# Patient Record
Sex: MALE | Race: ASIAN | NOT HISPANIC OR LATINO | Employment: FULL TIME | URBAN - METROPOLITAN AREA
[De-identification: names, ages, dates, MRNs, and addresses within clinical notes are randomized per-mention and may not be internally consistent; named-entity substitution may affect disease eponyms.]

---

## 2017-10-16 ENCOUNTER — AMBULATORY - RIVER FALLS (OUTPATIENT)
Dept: FAMILY MEDICINE | Facility: CLINIC | Age: 62
End: 2017-10-16

## 2017-10-17 LAB
CHOLEST SERPL-MCNC: 143 MG/DL
CHOLEST/HDLC SERPL: 3 {RATIO}
CREAT SERPL-MCNC: 0.53 MG/DL (ref 0.7–1.25)
GLUCOSE BLD-MCNC: 158 MG/DL (ref 65–99)
HBA1C MFR BLD: 7.4 %
HDLC SERPL-MCNC: 47 MG/DL
LDLC SERPL CALC-MCNC: 74 MG/DL
NONHDLC SERPL-MCNC: 96 MG/DL
PSA SERPL-MCNC: 0.9 NG/ML
TRIGL SERPL-MCNC: 135 MG/DL

## 2017-10-26 ENCOUNTER — OFFICE VISIT - RIVER FALLS (OUTPATIENT)
Dept: FAMILY MEDICINE | Facility: CLINIC | Age: 62
End: 2017-10-26

## 2017-10-26 ASSESSMENT — MIFFLIN-ST. JEOR: SCORE: 1567.93

## 2018-11-03 ENCOUNTER — OFFICE VISIT - RIVER FALLS (OUTPATIENT)
Dept: FAMILY MEDICINE | Facility: CLINIC | Age: 63
End: 2018-11-03

## 2018-11-04 LAB
CHOLEST SERPL-MCNC: 149 MG/DL
CHOLEST/HDLC SERPL: 2.6 {RATIO}
CREAT SERPL-MCNC: 0.58 MG/DL (ref 0.7–1.25)
GLUCOSE BLD-MCNC: 155 MG/DL (ref 65–99)
HBA1C MFR BLD: 8.1 %
HDLC SERPL-MCNC: 57 MG/DL
LDLC SERPL CALC-MCNC: 72 MG/DL
NONHDLC SERPL-MCNC: 92 MG/DL
PSA SERPL-MCNC: 0.8 NG/ML
TRIGL SERPL-MCNC: 123 MG/DL

## 2018-11-09 ENCOUNTER — OFFICE VISIT - RIVER FALLS (OUTPATIENT)
Dept: FAMILY MEDICINE | Facility: CLINIC | Age: 63
End: 2018-11-09

## 2018-11-09 ASSESSMENT — MIFFLIN-ST. JEOR: SCORE: 1554.32

## 2019-09-16 ENCOUNTER — AMBULATORY - RIVER FALLS (OUTPATIENT)
Dept: FAMILY MEDICINE | Facility: CLINIC | Age: 64
End: 2019-09-16

## 2019-11-09 ENCOUNTER — AMBULATORY - RIVER FALLS (OUTPATIENT)
Dept: FAMILY MEDICINE | Facility: CLINIC | Age: 64
End: 2019-11-09

## 2019-11-10 LAB
BUN SERPL-MCNC: 19 MG/DL (ref 7–25)
BUN/CREAT RATIO - HISTORICAL: 29 (ref 6–22)
CALCIUM SERPL-MCNC: 8.9 MG/DL (ref 8.6–10.3)
CHLORIDE BLD-SCNC: 103 MMOL/L (ref 98–110)
CHOLEST SERPL-MCNC: 154 MG/DL
CHOLEST/HDLC SERPL: 3 {RATIO}
CO2 SERPL-SCNC: 26 MMOL/L (ref 20–32)
CREAT SERPL-MCNC: 0.65 MG/DL (ref 0.7–1.25)
CREAT UR-MCNC: 116 MG/DL (ref 20–320)
EGFRCR SERPLBLD CKD-EPI 2021: 104 ML/MIN/1.73M2
GLUCOSE BLD-MCNC: 141 MG/DL (ref 65–99)
HBA1C MFR BLD: 7.8 %
HDLC SERPL-MCNC: 52 MG/DL
LDLC SERPL CALC-MCNC: 79 MG/DL
MICROALBUMIN UR-MCNC: 0.6 MG/DL
MICROALBUMIN/CREAT UR: 5 MG/G{CREAT}
NONHDLC SERPL-MCNC: 102 MG/DL
POTASSIUM BLD-SCNC: 4 MMOL/L (ref 3.5–5.3)
PSA SERPL-MCNC: 1 NG/ML
SODIUM SERPL-SCNC: 137 MMOL/L (ref 135–146)
TRIGL SERPL-MCNC: 125 MG/DL

## 2019-11-11 ENCOUNTER — COMMUNICATION - RIVER FALLS (OUTPATIENT)
Dept: FAMILY MEDICINE | Facility: CLINIC | Age: 64
End: 2019-11-11

## 2019-11-15 ENCOUNTER — OFFICE VISIT - RIVER FALLS (OUTPATIENT)
Dept: FAMILY MEDICINE | Facility: CLINIC | Age: 64
End: 2019-11-15

## 2019-11-15 ASSESSMENT — MIFFLIN-ST. JEOR: SCORE: 1558.86

## 2020-09-25 ENCOUNTER — AMBULATORY - RIVER FALLS (OUTPATIENT)
Dept: FAMILY MEDICINE | Facility: CLINIC | Age: 65
End: 2020-09-25

## 2020-12-15 ENCOUNTER — AMBULATORY - RIVER FALLS (OUTPATIENT)
Dept: FAMILY MEDICINE | Facility: CLINIC | Age: 65
End: 2020-12-15

## 2020-12-16 LAB
BUN SERPL-MCNC: 17 MG/DL (ref 7–25)
BUN/CREAT RATIO - HISTORICAL: ABNORMAL (ref 6–22)
CALCIUM SERPL-MCNC: 9 MG/DL (ref 8.6–10.3)
CHLORIDE BLD-SCNC: 101 MMOL/L (ref 98–110)
CHOLEST SERPL-MCNC: 147 MG/DL
CHOLEST/HDLC SERPL: 3.1 {RATIO}
CO2 SERPL-SCNC: 26 MMOL/L (ref 20–32)
CREAT SERPL-MCNC: 0.71 MG/DL (ref 0.7–1.25)
CREAT UR-MCNC: 99 MG/DL (ref 20–320)
EGFRCR SERPLBLD CKD-EPI 2021: 100 ML/MIN/1.73M2
GLUCOSE BLD-MCNC: 136 MG/DL (ref 65–99)
HBA1C MFR BLD: 7.5 %
HDLC SERPL-MCNC: 48 MG/DL
LDLC SERPL CALC-MCNC: 76 MG/DL
MICROALBUMIN UR-MCNC: 0.3 MG/DL
MICROALBUMIN/CREAT UR: 3 MG/G{CREAT}
NONHDLC SERPL-MCNC: 99 MG/DL
POTASSIUM BLD-SCNC: 4 MMOL/L (ref 3.5–5.3)
PSA SERPL-MCNC: 0.9 NG/ML
SODIUM SERPL-SCNC: 136 MMOL/L (ref 135–146)
TRIGL SERPL-MCNC: 148 MG/DL

## 2020-12-17 ENCOUNTER — COMMUNICATION - RIVER FALLS (OUTPATIENT)
Dept: FAMILY MEDICINE | Facility: CLINIC | Age: 65
End: 2020-12-17

## 2020-12-28 ENCOUNTER — OFFICE VISIT - RIVER FALLS (OUTPATIENT)
Dept: FAMILY MEDICINE | Facility: CLINIC | Age: 65
End: 2020-12-28

## 2020-12-28 ASSESSMENT — MIFFLIN-ST. JEOR: SCORE: 1595.15

## 2021-06-23 ENCOUNTER — AMBULATORY - RIVER FALLS (OUTPATIENT)
Dept: FAMILY MEDICINE | Facility: CLINIC | Age: 66
End: 2021-06-23

## 2021-06-24 ENCOUNTER — COMMUNICATION - RIVER FALLS (OUTPATIENT)
Dept: FAMILY MEDICINE | Facility: CLINIC | Age: 66
End: 2021-06-24

## 2021-06-24 LAB
CHOLEST SERPL-MCNC: 116 MG/DL
CHOLEST/HDLC SERPL: 2.7 {RATIO}
HBA1C MFR BLD: 8.3 %
HDLC SERPL-MCNC: 43 MG/DL
LDLC SERPL CALC-MCNC: 47 MG/DL
NONHDLC SERPL-MCNC: 73 MG/DL
TRIGL SERPL-MCNC: 181 MG/DL

## 2021-10-08 ENCOUNTER — AMBULATORY - RIVER FALLS (OUTPATIENT)
Dept: FAMILY MEDICINE | Facility: CLINIC | Age: 66
End: 2021-10-08

## 2021-12-29 ENCOUNTER — AMBULATORY - RIVER FALLS (OUTPATIENT)
Dept: FAMILY MEDICINE | Facility: CLINIC | Age: 66
End: 2021-12-29

## 2021-12-30 ENCOUNTER — COMMUNICATION - RIVER FALLS (OUTPATIENT)
Dept: FAMILY MEDICINE | Facility: CLINIC | Age: 66
End: 2021-12-30

## 2021-12-30 LAB
BUN SERPL-MCNC: 20 MG/DL (ref 7–25)
BUN/CREAT RATIO - HISTORICAL: ABNORMAL (ref 6–22)
CALCIUM SERPL-MCNC: 9.3 MG/DL (ref 8.6–10.3)
CHLORIDE BLD-SCNC: 102 MMOL/L (ref 98–110)
CO2 SERPL-SCNC: 26 MMOL/L (ref 20–32)
CREAT SERPL-MCNC: 0.71 MG/DL (ref 0.7–1.25)
CREAT UR-MCNC: 106 MG/DL (ref 20–320)
EGFRCR SERPLBLD CKD-EPI 2021: 99 ML/MIN/1.73M2
GLUCOSE BLD-MCNC: 129 MG/DL (ref 65–99)
HBA1C MFR BLD: 7.7 %
MICROALBUMIN UR-MCNC: 0.4 MG/DL
MICROALBUMIN/CREAT UR: 4 MG/G{CREAT}
POTASSIUM BLD-SCNC: 4.1 MMOL/L (ref 3.5–5.3)
PSA SERPL-MCNC: 1.16 NG/ML
SODIUM SERPL-SCNC: 138 MMOL/L (ref 135–146)

## 2022-02-12 VITALS
HEIGHT: 68 IN | WEIGHT: 178 LBS | BODY MASS INDEX: 26.98 KG/M2 | SYSTOLIC BLOOD PRESSURE: 125 MMHG | DIASTOLIC BLOOD PRESSURE: 78 MMHG | HEART RATE: 84 BPM

## 2022-02-12 VITALS
DIASTOLIC BLOOD PRESSURE: 85 MMHG | BODY MASS INDEX: 27.89 KG/M2 | HEIGHT: 68 IN | DIASTOLIC BLOOD PRESSURE: 80 MMHG | SYSTOLIC BLOOD PRESSURE: 142 MMHG | HEART RATE: 72 BPM | WEIGHT: 184 LBS | HEART RATE: 76 BPM | TEMPERATURE: 97.6 F | SYSTOLIC BLOOD PRESSURE: 142 MMHG

## 2022-02-12 VITALS
DIASTOLIC BLOOD PRESSURE: 84 MMHG | SYSTOLIC BLOOD PRESSURE: 132 MMHG | HEIGHT: 68 IN | BODY MASS INDEX: 26.52 KG/M2 | WEIGHT: 175 LBS | HEART RATE: 92 BPM

## 2022-02-12 VITALS
DIASTOLIC BLOOD PRESSURE: 68 MMHG | WEIGHT: 176 LBS | HEART RATE: 96 BPM | HEIGHT: 68 IN | TEMPERATURE: 97.8 F | SYSTOLIC BLOOD PRESSURE: 136 MMHG | BODY MASS INDEX: 26.67 KG/M2

## 2022-02-12 VITALS
HEIGHT: 68 IN | HEART RATE: 87 BPM | BODY MASS INDEX: 28.18 KG/M2 | SYSTOLIC BLOOD PRESSURE: 120 MMHG | DIASTOLIC BLOOD PRESSURE: 78 MMHG

## 2022-02-15 NOTE — LETTER
(Inserted Image. Unable to display)         December 19, 2019        YOANTHAN KENT  [address], [place] [id number]        Dear YONATHAN,    Thank you for selecting [facility] for your healthcare needs.    Our records indicate you are due for the following services:     Clinical Support Staff (CSS)-Only Blood Pressure Check ~ Please stop in anytime to have your blood pressure rechecked. This is a free service and no appointment necessary.     So we can best determine if your medications are effective in lowering your blood pressure, please make sure your blood pressure medicine has been in your system for at least 1-2 hours prior to coming in.  We encourage you to avoid caffeine or other stimulants prior to having your blood pressure checked and come at a time when you are not feeling rushed.     If you check your blood pressure at home, please bring in your blood pressure monitor and home blood pressure readings.  We will check your machine for accuracy and also share your home readings with your Healthcare Provider.     To schedule an appointment or if you have further questions, please contact your primary clinic:   [facility]       [phone]   [facility]       [phone]              [facility]     [phone]      Powered by [employer] and [employer]    Sincerely,    Aiden Drummond MD, FACP

## 2022-02-15 NOTE — NURSING NOTE
Comprehensive Intake Entered On:  11/15/2019 9:45 AM CST    Performed On:  11/15/2019 9:40 AM CST by Erika Jung               Summary   Chief Complaint :   Patient is here today for annual px   Weight Measured :   176.0 lb(Converted to: 176 lb 0 oz, 79.83 kg)    Height Measured :   67.75 in(Converted to: 5 ft 8 in, 172.08 cm)    Body Mass Index :   26.96 kg/m2 (HI)    Body Surface Area :   1.95 m2   Systolic Blood Pressure :   138 mmHg (HI)    Diastolic Blood Pressure :   72 mmHg   Mean Arterial Pressure :   94 mmHg   Peripheral Pulse Rate :   96 bpm   BP Site :   Right arm   BP Method :   Manual   HR Method :   Electronic   Temperature Tympanic :   97.8 DegF(Converted to: 36.6 DegC)  (LOW)    Erika Jung - 11/15/2019 9:40 AM CST   Health Status   Allergies Verified? :   Yes   Medication History Verified? :   Yes   Medical History Verified? :   Yes   Pre-Visit Planning Status :   Completed   Tobacco Use? :   Never smoker   Erika Jung - 11/15/2019 9:40 AM CST   Consents   Consent for Immunization Exchange :   Consent Granted   Consent for Immunizations to Providers :   Consent Granted   Erika Jung - 11/15/2019 9:40 AM CST   Meds / Allergies   (As Of: 11/15/2019 9:45:23 AM CST)   Allergies (Active)   No Known Medication Allergies  Estimated Onset Date:   Unspecified ; Created By:   Brisa Schreiber MA; Reaction Status:   Active ; Category:   Drug ; Substance:   No Known Medication Allergies ; Type:   Allergy ; Updated By:   Brisa Schreiber MA; Reviewed Date:   11/15/2019 9:42 AM CST        Medication List   (As Of: 11/15/2019 9:45:23 AM CST)   Prescription/Discharge Order    dapagliflozin  :   dapagliflozin ; Status:   Prescribed ; Ordered As Mnemonic:   Farxiga 5 mg oral tablet ; Simple Display Line:   5 mg, 1 tab(s), Oral, daily, for 90 day(s), 90 tab(s), 3 Refill(s) ; Ordering Provider:   Aiden Drummond MD; Catalog Code:   dapagliflozin ; Order Dt/Tm:   11/9/2018 12:32:24 PM CST           metFORMIN  :   metFORMIN ; Status:   Prescribed ; Ordered As Mnemonic:   metFORMIN 500 mg oral tablet, extended release ; Simple Display Line:   1,000 mg, 2 tab(s), po, daily, for 90 day(s), 180 tab(s), 3 Refill(s) ; Ordering Provider:   Aiden Drummond MD; Catalog Code:   metFORMIN ; Order Dt/Tm:   11/9/2018 12:32:22 PM CST          hydrochlorothiazide-lisinopril  :   hydrochlorothiazide-lisinopril ; Status:   Prescribed ; Ordered As Mnemonic:   hydrochlorothiazide-lisinopril 12.5 mg-20 mg oral tablet ; Simple Display Line:   1 tab(s), PO, Daily, 90 tab(s), 3 Refill(s) ; Ordering Provider:   Aiden Drummond MD; Catalog Code:   hydrochlorothiazide-lisinopril ; Order Dt/Tm:   11/9/2018 12:32:20 PM CST          Miscellaneous Rx Supply  :   Miscellaneous Rx Supply ; Status:   Prescribed ; Ordered As Mnemonic:   Misc Prescription Supply ; Simple Display Line:   See Instructions, test daily, 100 EA, 3 Refill(s) ; Ordering Provider:   Aiden Drummond MD; Catalog Code:   Miscellaneous Rx Supply ; Order Dt/Tm:   11/9/2018 12:32:19 PM CST          pravastatin  :   pravastatin ; Status:   Prescribed ; Ordered As Mnemonic:   Pravachol 20 mg oral tablet ; Simple Display Line:   20 mg, 1 tab(s), PO, Daily, 90 tab(s), 3 Refill(s) ; Ordering Provider:   Aiden Drummond MD; Catalog Code:   pravastatin ; Order Dt/Tm:   11/9/2018 12:32:16 PM CST

## 2022-02-15 NOTE — NURSING NOTE
Depression Screening Entered On:  11/15/2019 10:07 AM CST    Performed On:  11/15/2019 10:06 AM CST by Erika Jung               Depression Screening   Little Interest - Pleasure in Activities :   Not at all   Feeling Down, Depressed, Hopeless :   Not at all   Initial Depression Screen Score :   0    Trouble Falling or Staying Asleep :   Not at all   Feeling Tired or Little Energy :   Not at all   Poor Appetite or Overeating :   Not at all   Feeling Bad About Yourself :   Not at all   Trouble Concentrating :   Not at all   Moving or Speaking Slowly :   Not at all   Thoughts Better Off Dead or Hurting Self :   Not at all   Detailed Depression Screen Score :   0    Total Depression Screen Score :   0    BEENA Difficulty with Work, Home, Others :   Not difficult at all   Depression Screen Interpretation :   Negative   Erika Jung - 11/15/2019 10:06 AM CST

## 2022-02-15 NOTE — LETTER
(Inserted Image. Unable to display)   November 11, 2019      YONATHAN KENT      [address], [place] [id number]        Dear YONATHAN,    Thank you for selecting [facility] ([facility] for your healthcare needs.  Below you will find the results of your recent tests done at our clinic.     Diabetes not well controlled. Please schedule an appointment.      Result Name Current Result Previous Result Reference Range   Sodium Level (mmol/L)  137 11/9/2019  137 11/3/2018 135 - 146   Potassium Level (mmol/L)  4.0 11/9/2019  4.2 11/3/2018 3.5 - 5.3   Chloride Level (mmol/L)  103 11/9/2019  101 11/3/2018 98 - 110   CO2 Level (mmol/L)  26 11/9/2019  27 11/3/2018 20 - 32   Glucose Level (mg/dL) ((H)) 141 11/9/2019 ((H)) 155 11/3/2018 65 - 99   BUN (mg/dL)  19 11/9/2019  16 11/3/2018 7 - 25   Creatinine Level (mg/dL) ((L)) 0.65 11/9/2019 ((L)) 0.58 11/3/2018 0.70 - 1.25   eGFR (mL/min/1.73m2)  104 11/9/2019  110 11/3/2018 > OR = 60 -    Calcium Level (mg/dL)  8.9 11/9/2019  9.0 11/3/2018 8.6 - 10.3   Hgb A1c ((H)) 7.8 11/9/2019 ((H)) 8.1 11/3/2018  - <5.7   Cholesterol (mg/dL)  154 11/9/2019  149 11/3/2018  - <200   Non-HDL Cholesterol  102 11/9/2019  92 11/3/2018  - <130   HDL (mg/dL)  52 11/9/2019  57 11/3/2018 >40 -    Cholesterol/HDL Ratio  3.0 11/9/2019  2.6 11/3/2018  - <5.0   LDL  79 11/9/2019  72 11/3/2018    Triglyceride (mg/dL)  125 11/9/2019  123 11/3/2018  - <150   PSA (ng/mL)  1.0 11/9/2019  0.8 11/3/2018  - < OR = 4.0   Ur Microalbumin/Creatinine Ratio  5 11/9/2019  8 11/3/2018  - <30       Please contact me or my assistant at [phone] if you have any questions.     Sincerely,        Aiden Drummond MD

## 2022-02-15 NOTE — TELEPHONE ENCOUNTER
Entered by Eliana Cheng CMA on November 19, 2019 2:51:46 PM CST  ---------------------  From: Eliana Cheng CMA   To: [facility]    Sent: 11/19/2019 2:51:46 PM CST  Subject: Medication Management     ** Submitted: **  Order:dapagliflozin (Farxiga 10 mg oral tablet)  1 tab(s)  Oral  daily  Qty:  90 tab(s)        Refills:  0          Substitutions Allowed     Route To Pharmacy - [facility]    Signed by Eliana Cheng CMA  11/19/2019 2:51:00 PM    ** Submitted: **  Complete:dapagliflozin (dapagliflozin 10 mg oral tablet)   Signed by Eliana Cheng CMA  11/19/2019 2:51:00 PM    ** Not Approved:  **  dapagliflozin (FARXIGA 10 MG TABLET)  TAKE 1 TABLET BY MOUTH DAILY  Qty:  30 tab(s)        Days Supply:  30        Refills:  0          Substitutions Allowed     Route To Pharmacy - [facility]   Note from Pharmacy:  PLEASE SEND 90 DAY SUPPLY FOR MAIL ORDER THANKS!  Signed by Eliana Cheng CMA            ------------------------------------------  From: [facility]  To: Aiden Drummond MD  Sent: November 18, 2019 3:01:01 PM KIARRA  Subject: Medication Management  Due: November 19, 2019 3:01:01 PM CST    ** On Hold Pending Signature **  Drug: dapagliflozin (Farxiga 10 mg oral tablet)  1 tab(s) Oral daily  Quantity: 30 tab(s)  Days Supply: 30  Refills: 0  Substitutions Allowed  Notes from Pharmacy: PLEASE SEND 90 DAY SUPPLY FOR MAIL ORDER THANKS!    Dispensed Drug: dapagliflozin (Farxiga 10 mg oral tablet)  TAKE 1 TABLET BY MOUTH DAILY  Quantity: 30 tab(s)  Days Supply: 30  Refills: 0  Substitutions Allowed  Notes from Pharmacy: PLEASE SEND 90 DAY SUPPLY FOR MAIL ORDER THANKS!  ------------------------------------------Med Refill      Date of last office visit and reason:  11/15/19; annual      Date of last Med Check / Px:   11/15/19  Date of last labs pertaining to med:  11/9/19    RTC order in chart:  yes; due in Feb for repeat labs    For Protocol refill, has patient been contacted:  n/a

## 2022-02-15 NOTE — LETTER
(Inserted Image. Unable to display)         March 29, 2021      YONATHAN KENT  [address] [place] [id number]          Dear YONATHAN,      Thank you for selecting [facility] for your healthcare needs.    Our records indicate you are due for the following services:     Fasting Lab Tests ~ Please do not eat or drink anything 10 hours prior to your scheduled appointment time.  (Water and any medications that you may need are allowed unless directed otherwise.)    If you had your labs done at another facility or with Direct Access Lab Testing at [facility], please bring in a copy of the results to your next visit, mail a copy, or drop off a copy of your results to your Healthcare Provider.    (FYI   Regarding office visits: In some instances, a video visit or telephone visit may be offered as an option.)      To schedule an appointment or if you have further questions, please contact your clinic at [phone].      Powered by [employer] and [employer]    Sincerely,    Aiden Drummond MD, FACP

## 2022-02-15 NOTE — NURSING NOTE
CAGE Assessment Entered On:  11/18/2019 1:09 PM CST    Performed On:  11/15/2019 1:09 PM CST by Ella April               Assessment   Have you ever felt you should cut down on your drinking :   No   Have people annoyed you by criticizing your drinking :   No   Have you ever felt bad or guilty about your drinking :   No   Have you ever taken a drink first thing in the morning to steady your nerves or get rid of a hangover (Eye-opener) :   No   CAGE Score :   0    Ella , April - 11/18/2019 1:09 PM CST

## 2022-02-15 NOTE — NURSING NOTE
Comprehensive Intake Entered On:  12/28/2020 3:09 PM CST    Performed On:  12/28/2020 3:02 PM CST by Anuja Christian LPN               Summary   Chief Complaint :   Annual physical and follow up labs   Weight Measured :   184 lb(Converted to: 184 lb 0 oz, 83.461 kg)    Height Measured :   67.75 in(Converted to: 5 ft 8 in, 172.08 cm)    Body Mass Index :   28.18 kg/m2 (HI)    Body Surface Area :   2 m2   Systolic Blood Pressure :   134 mmHg (HI)    Diastolic Blood Pressure :   84 mmHg (HI)    Mean Arterial Pressure :   101 mmHg   Peripheral Pulse Rate :   76 bpm   BP Site :   Right arm   Pulse Site :   Radial artery   BP Method :   Manual   HR Method :   Manual   Temperature Tympanic :   97.6 DegF(Converted to: 36.4 DegC)  (LOW)    Anuja Christian LPN - 12/28/2020 3:02 PM CST   Health Status   Allergies Verified? :   Yes   Medication History Verified? :   Yes   Pre-Visit Planning Status :   Completed   Tobacco Use? :   Never smoker   Anuja Christian LPN - 12/28/2020 3:02 PM CST   Consents   Consent for Immunization Exchange :   Consent Granted   Consent for Immunizations to Providers :   Consent Granted   Anuja Christian LPN - 12/28/2020 3:02 PM CST   Meds / Allergies   (As Of: 12/28/2020 3:09:59 PM CST)   Allergies (Active)   No Known Medication Allergies  Estimated Onset Date:   Unspecified ; Created By:   Brisa Schreiber MA; Reaction Status:   Active ; Category:   Drug ; Substance:   No Known Medication Allergies ; Type:   Allergy ; Updated By:   Brisa Schreiber MA; Reviewed Date:   11/15/2019 9:42 AM CST        Medication List   (As Of: 12/28/2020 3:09:59 PM CST)   Prescription/Discharge Order    dapagliflozin  :   dapagliflozin ; Status:   Completed ; Ordered As Mnemonic:   Farxiga 10 mg oral tablet ; Simple Display Line:   1 tab(s), Oral, daily, 90 tab(s), 0 Refill(s) ; Ordering Provider:   Aiden Drummond MD; Catalog Code:   dapagliflozin ; Order Dt/Tm:   11/19/2019 2:51:25 PM CST           hydrochlorothiazide-lisinopril  :   hydrochlorothiazide-lisinopril ; Status:   Prescribed ; Ordered As Mnemonic:   hydrochlorothiazide-lisinopril 12.5 mg-20 mg oral tablet ; Simple Display Line:   2 tab(s), PO, Daily, for 90 day(s), 180 tab(s), 3 Refill(s) ; Ordering Provider:   Aiden Drummond MD; Catalog Code:   hydrochlorothiazide-lisinopril ; Order Dt/Tm:   11/15/2019 9:53:17 AM CST          metFORMIN  :   metFORMIN ; Status:   Prescribed ; Ordered As Mnemonic:   metFORMIN 500 mg oral tablet, extended release ; Simple Display Line:   2,000 mg, 4 tab(s), po, daily, for 90 day(s), 360 tab(s), 3 Refill(s) ; Ordering Provider:   Aiden Drummond MD; Catalog Code:   metFORMIN ; Order Dt/Tm:   11/15/2019 9:53:38 AM CST          Miscellaneous Rx Supply  :   Miscellaneous Rx Supply ; Status:   Prescribed ; Ordered As Mnemonic:   Misc Prescription Supply ; Simple Display Line:   See Instructions, test daily, 100 EA, 3 Refill(s) ; Ordering Provider:   Aiden Drummond MD; Catalog Code:   Miscellaneous Rx Supply ; Order Dt/Tm:   11/15/2019 9:54:53 AM CST          pravastatin  :   pravastatin ; Status:   Prescribed ; Ordered As Mnemonic:   Pravachol 40 mg oral tablet ; Simple Display Line:   40 mg, 1 tab(s), Oral, daily, 90 tab(s), 3 Refill(s) ; Ordering Provider:   Aiden Drummond MD; Catalog Code:   pravastatin ; Order Dt/Tm:   11/15/2019 9:55:04 AM CST            ID Risk Screen   Recent Travel History :   No recent travel   Family Member Travel History :   No recent travel   Other Exposure to Infectious Disease :   Unknown   Anuja Christian LPN - 12/28/2020 3:02 PM CST   Social History   Social History   (As Of: 12/28/2020 3:09:59 PM CST)   Alcohol:        Current, Beer (12 oz), 5-6 times per week   (Last Updated: 11/1/2017 10:48:58 AM CDT by Swati Landrum)          Tobacco:        Never (less than 100 in lifetime)   (Last Updated: 12/28/2020 3:02:25 PM CST by Anuja Christian LPN)          Electronic  Cigarette/Vaping:        Electronic Cigarette Use: Never.   (Last Updated: 12/28/2020 3:02:29 PM CST by Anuja Christian LPN)          Substance Abuse:        Never   (Last Updated: 11/14/2018 7:36:44 AM CST by Swati Landrum)          Employment/School:        Employed, Work/School description: College Prof..   (Last Updated: 11/29/2010 2:49:41 PM CST by Peg Bustos)          Home/Environment:        Marital status: .   (Last Updated: 11/14/2018 7:37:18 AM CST by Swati Landrum)          Exercise:        Exercise frequency: 5-6 times/week.  Exercise type: Running.   (Last Updated: 11/14/2018 7:37:04 AM CST by Swtai Landrum)          Sexual:        Identifies as male, Sexual orientation: Straight or heterosexual.   (Last Updated: 11/14/2018 7:37:11 AM CST by Swati Landrum)

## 2022-02-15 NOTE — PROGRESS NOTES
Patient:   YONATHAN KENT            MRN: [id number]            FIN: [id number]               Age:   63 years     Sex:  Male     :  1955   Associated Diagnoses:   Diabetes mellitus; Hearing loss; Hematuria, microscopic; HTN (hypertension); Wellness examination; Pain of left forearm   Author:   Aiden Drummond MD      Visit Information   Visit type:  Annual exam.    Accompanied by:  No one.    Source of history:  Self.    History limitation:  None.       Chief Complaint   2018 11:42 AM CST   physical, left arm pain      History of Present Illness    The patient is here for follow up of his hypertension and diabetes.  His A1c has gone up and his blood pressure is not at goal.  Generally he is feeling well, watching his diet and trying to get some regular exercise.  He had a fall with his left hand outstretched in July while in the garden and complains of pain distal to the left elbow.  No other complaints.          Review of Systems   See [facility]       Health Status   Allergies:    Allergic Reactions (Selected)  No Known Medication Allergies   Medications:  (Selected)   Prescriptions  Prescribed  Farxiga 5 mg oral tablet: = 1 tab(s) ( 5 mg ), Oral, daily, # 90 tab(s), 3 Refill(s), Type: Maintenance, Pharmacy: [facility], 1 tab(s) Oral daily,x90 day(s)  [facility] Prescription Supply: accucheck umu test strips, See Instructions, Instructions: test daily, Supply, # 100 EA, 3 Refill(s), Type: Maintenance, Pharmacy: [facility], resending since accucheck umu test strips was missing from original order you received, test d...  Pravachol 20 mg oral tablet: = 1 tab(s) ( 20 mg ), PO, Daily, # 90 tab(s), 3 Refill(s), Type: Maintenance, Pharmacy: [facility], 1 tab(s) Oral daily  hydrochlorothiazide-lisinopril 12.5 mg-20 mg oral tablet: 1 tab(s), PO, Daily, # 90 tab(s), 3 Refill(s), Type: Maintenance, Pharmacy: [facility], 1 tab(s) Oral daily  metFORMIN 500 mg oral tablet, extended  release: = 2 tab(s) ( 1,000 mg ), po, daily, # 180 tab(s), 3 Refill(s), Type: Maintenance, Pharmacy: [facility], 2 tab(s) Oral daily,x90 day(s),    Medications          *denotes recorded medication          Misc Prescription Supply: See Instructions, test daily, 100 EA, 3 Refill(s).          Farxiga 5 mg oral tablet: 5 mg, 1 tab(s), Oral, daily, for 90 day(s), 90 tab(s), 3 Refill(s).          hydrochlorothiazide-lisinopril 12.5 mg-20 mg oral tablet: 1 tab(s), PO, Daily, 90 tab(s), 3 Refill(s).          metFORMIN 500 mg oral tablet, extended release: 1,000 mg, 2 tab(s), po, daily, for 90 day(s), 180 tab(s), 3 Refill(s).          Pravachol 20 mg oral tablet: 20 mg, 1 tab(s), PO, Daily, 90 tab(s), 3 Refill(s).     Problem list:    All Problems  Diabetes mellitus / SNOMED CT 515873389 / Confirmed  Hearing loss / SNOMED CT 94209900 / Confirmed  Hematuria, microscopic / SNOMED CT 367989270 / Confirmed  HTN (hypertension) / SNOMED CT 2767499471 / Confirmed  Canceled: HTN, goal below 140/90 / SNOMED CT 9024456994      Histories   Past Medical History:    Active  Diabetes mellitus (265076342): Onset on 1/1/2007 at 51 years.  HTN (hypertension) (4212223506): Onset on 1/1/2000 at 44 years.  Hearing loss (82801633)  Hematuria, microscopic (426211124)  Comments:  8/1/2013 CDT 12:07 PM CDT - Didi Fernandez  Microscopic    10/22/2015 CDT 3:21 PM CDT - Aiden Drummond MD  Negative workup   Family History:    CA - Lung cancer  Father: onset at 79 years.  Diabetes mellitus type 2  Father     Procedure history:    Screening for malignant neoplasm of colon (5951517922) on 11/18/2015 at 60 Years.  Comments:  12/21/2015 11:48 AM - Dorschner , Idalmis  Cologuard is negative  Cystoscopy (85172213) on 7/13/2000 at 44 Years.   Social History:        Alcohol Assessment            Current, Beer (12 oz), 5-6 times per week      Employment and Education Assessment            Employed, Work/School description: College Prof..      Exercise  and Physical Activity Assessment            Exercise frequency: Daily.                     Comments:                      10/22/2015 - Hoda LOGAN, Aiden                     Runs 6 miles      Other Assessment            Marital status,         Physical Examination   Vital Signs   11/9/2018 11:53 AM CST Systolic Blood Pressure 132 mmHg  HI    Diastolic Blood Pressure 84 mmHg  HI    Mean Arterial Pressure 100 mmHg    BP Site Right arm    BP Method Manual   11/9/2018 11:42 AM CST Peripheral Pulse Rate 92 bpm    Systolic Blood Pressure 142 mmHg  HI    Diastolic Blood Pressure 80 mmHg    Mean Arterial Pressure 101 mmHg    BP Site Right arm      Measurements from flowsheet : Measurements   11/9/2018 11:42 AM CST Height Measured - Standard 67.75 in    Weight Measured - Standard 175 lb    BSA 1.95 m2    Body Mass Index 26.8 kg/m2  HI      General:  Alert and oriented, No acute distress.    Eye:  Normal conjunctiva.    HENT:  Normocephalic, Normal hearing, Oral mucosa is moist, No pharyngeal erythema, Hearing aide left ear.    Neck:  Supple, Non-tender, No carotid bruit, No lymphadenopathy, No thyromegaly.    Respiratory:  Lungs are clear to auscultation, Respirations are non-labored.    Cardiovascular:  Normal rate, Regular rhythm, No murmur, No gallop, Normal peripheral perfusion, No edema.    Gastrointestinal:  Soft, Non-tender, Non-distended, Normal bowel sounds, No organomegaly.    Genitourinary:       Prostate: Symmetric, Enlarged  3 /4, Not nodular.    Lymphatics:  No lymphadenopathy neck, axilla, groin.    Musculoskeletal:  No deformity, Normal gait.         Upper extremity exam: Elbow ( Left, No deformity, No swelling, No tenderness, Strength  5 /5, Normal range of motion ).    Feet:  Normal by visual exam, Normal pulses, Sensation intact, By monofilament exam, By vibration.    Integumentary:  No pallor.    Neurologic:  No focal deficits.    Cognition and Speech:  Oriented, Speech clear and coherent,  Functional cognition intact.    Psychiatric:  Cooperative, Appropriate mood & affect, Normal judgment, Non-suicidal.       Review / Management   Results review:  Lab results   11/3/2018 8:12 AM CDT Sodium Level 137 mmol/L    Potassium Level 4.2 mmol/L    Chloride Level 101 mmol/L    CO2 Level 27 mmol/L    Glucose Level 155 mg/dL  HI    BUN 16 mg/dL    Creatinine Level 0.58 mg/dL  LOW    BUN/Creat Ratio 28  HI    eGFR 110 mL/min/1.73m2    eGFR African American 128 mL/min/1.73m2    Calcium Level 9.0 mg/dL    Hgb A1c 8.1  HI    Cholesterol 149 mg/dL    Non-HDL Cholesterol 92    HDL 57 mg/dL    Cholesterol/HDL Ratio 2.6    LDL 72    Triglyceride 123 mg/dL    PSA 0.8 ng/mL    U Microalbumin 1.2 mg/dL    Ur Creatinine 153 mg/dL    Ur Microalbumin/Creatinine Ratio 8   .       Impression and Plan   Diagnosis     Diabetes mellitus (XTQ71-JP E11.9).     Hearing loss (ECU49-YD H91.90).     Hematuria, microscopic (QNE13-SL R31.29).     HTN (hypertension) (KNC13-ZV I10).     Wellness examination (SVQ38-IH Z00.00).     Patient Instructions:       Counseled: Diet, Activity, Verbalized understanding.    Summary:  BP and A1c Above goal.    Orders     Orders (Selected)   Outpatient Orders  Ordered  Return to Clinic (Request): RFV: BMP, Hgb A1c, Return in 3 months  Return to Clinic (Request): RFV: Standing Orders  Return to Clinic (Request): RFV: Wellness, Return in 12 months, Instructions: Blood work before visit.  Prescriptions  Prescribed  Farxiga 5 mg oral tablet: = 1 tab(s) ( 5 mg ), Oral, daily, # 90 tab(s), 3 Refill(s), Type: Maintenance, Pharmacy: [facility], 1 tab(s) Oral daily,x90 day(s)  [facility] Prescription Supply: accucheck umu test strips, See Instructions, Instructions: test daily, Supply, # 100 EA, 3 Refill(s), Type: Maintenance, Pharmacy: [facility], resending since accucheck umu test strips was missing from original order you received, test d...  Pravachol 20 mg oral tablet: = 1 tab(s) ( 20 mg ),  PO, Daily, # 90 tab(s), 3 Refill(s), Type: Maintenance, Pharmacy: [facility], 1 tab(s) Oral daily  hydrochlorothiazide-lisinopril 12.5 mg-20 mg oral tablet: 1 tab(s), PO, Daily, # 90 tab(s), 3 Refill(s), Type: Maintenance, Pharmacy: [facility], 1 tab(s) Oral daily  metFORMIN 500 mg oral tablet, extended release: = 2 tab(s) ( 1,000 mg ), po, daily, # 180 tab(s), 3 Refill(s), Type: Maintenance, Pharmacy: [facility], 2 tab(s) Oral daily,x90 day(s)  Discontinued  Januvia 100 mg oral tablet: 1 tab(s) ( 100 mg ), po, daily, # 90 tab(s), 3 Refill(s), Type: Maintenance, Pharmacy: [facility], 1 tab(s) po daily,x90 day(s).     Annual Flu.     Cologuard.     Diagnosis     Pain of left forearm (BVS42-UV M79.632).     Course:  Improving.    Orders     Orders (Selected)   Outpatient Orders  Ordered  XR Forearm Left (Request): Pain of left forearm.

## 2022-02-15 NOTE — NURSING NOTE
CAGE Assessment Entered On:  12/28/2020 3:58 PM CST    Performed On:  12/28/2020 3:57 PM CST by Caroline Tadeo               Assessment   Have you ever felt you should cut down on your drinking :   No   Have people annoyed you by criticizing your drinking :   No   Have you ever felt bad or guilty about your drinking :   No   Have you ever taken a drink first thing in the morning to steady your nerves or get rid of a hangover (Eye-opener) :   No   CAGE Score :   0    Caroline Tadeo - 12/28/2020 3:57 PM CST

## 2022-02-15 NOTE — CARE COORDINATION
Pt appears on [facility] chronic disease panel as out of parameters for A1C.  Pt has chart alert saying he will only come in yearly for physical and labs, nothing more.  Pt was seen 11/9/2018, pt has RTC 2/2019 for CDV/labs, RTC 11/2019 AWV.

## 2022-02-15 NOTE — TELEPHONE ENCOUNTER
---------------------From: Noreen Moran CMA Sent: 12/19/2019 12:44:55 PM CSTSubject: General Message-dose changes on meds  Phone MessagePCP:   [facility]  Time of Call:  1211   Person Calling:  [facility] [phone] number:  _Returned call at: [phone]:   TREVIN regarding dose changes on metformin prev at 2daily and now sent over for 4dailyalso on lisinopril/hctz prev on 1 daily and now at 2 daily.  Wanted to confirm that this is correct.  Message left per ll/15 message left that per the 11/15 note they were both increased.  Asked to c/b with any other questionsLast office visit and reason: 11/15 px

## 2022-02-15 NOTE — TELEPHONE ENCOUNTER
---------------------  From: Raven Richardson LPN (Phone Messages [employer] ([id number]))   To: Phone Messages [employer] ([id number] [facility] [place]);     Sent: 2/20/2019 10:27:29 AM CST  Subject: DM med ?        Phone Message    PCP:   TAYA       Time of Call:  0938       Person Calling:  Patients wife   Phone number:  [phone]    Returned call at: [phone]    Note:   Patient is needing to know what new DM medication he was started on so they can get it from the pharmacy.  Went over all medications on med list with patient/wife.  They are calling pharmacy to have them mailed to them.      Last office visit and reason:  11/09/2018 Annual Visit---------------------  From: Eliana Cheng CMA (Phone Messages [employer] ([id number]_[facility]))   To: Care Coordinators Pool ([facility]);     Sent: 2/20/2019 11:46:15 AM CST  Subject: FW: DM med ?      Wife calls back at 11:09 am, she is very confused about pt's medications. We went over his current medication list again with wife. She is thinking that [facility] had discontinued the metformin, which it doesn't look like happened from what I can see. Are you able to contact pt's wife to help clarify his medications with her?I called and spoke to wife and repeated multiple times that it looks like J [facility] was d/c and pt started on Farxiga. It appears he is to cont. on metformin. I also reviewed his other medicines and what they are for. Looks like HCTZ/Lisinopril was increased.    I mailed pt/wife a visit summary to reference.     Aislinn, wife wants to know if pt can take Metformin and Farxiga at the same time? Could you call and talk to her about it?---------------------  From: Teresa Anna CMA (Care Coordinators Pool ([facility]))   To: Melanie Antony;     Sent: 2/20/2019 1:27:24 PM CST  Subject: FW: DM med ?

## 2022-02-15 NOTE — CARE COORDINATION
Pt appears on [facility] chronic disease panel as out of parameters for A1C  Pt has chart alert saying he will only come in yearly for physical and labs, nothing more.  Pt was seen 11/9/2018, pt has RTC 2/2019 for CDV/labs, RTC 11/2019 AWV.

## 2022-02-15 NOTE — NURSING NOTE
Quick Intake Entered On:  11/15/2019 9:52 AM CST    Performed On:  11/15/2019 9:52 AM CST by Aiden Drummond MD               Summary   Height Measured :   67.75 in(Converted to: 5 ft 8 in, 172.08 cm)    Systolic Blood Pressure :   136 mmHg (HI)    Diastolic Blood Pressure :   68 mmHg   Mean Arterial Pressure :   91 mmHg   BP Site :   Right arm   BP Method :   Manual   Aiden Drummond MD - 11/15/2019 9:52 AM CST

## 2022-02-15 NOTE — LETTER
(Inserted Image. Unable to display)   February 12, 2019      YONATHAN KENT  [address] [place], [place] [id number]        Dear YONATHAN,      Thank you for selecting [facility] (previously [facility]) for your healthcare needs.      Our records indicate you are due for the following services:     Non-Fasting Labs.    If you had your labs done at another facility or with Direct Access Lab Testing at [facility], please bring in a copy of the results to your next visit, mail a copy, or drop off a copy of your results to your Healthcare Provider.    To schedule an appointment or if you have further questions, please contact your primary clinic:   [facility]       [phone]   [facility]       [phone]              [facility]     [phone]      Powered by [employer]    Sincerely,    Aiden Drummond MD, FACP

## 2022-02-15 NOTE — LETTER
(Inserted Image. Unable to display)         November 19, 2020        YONATHAN KENT  [address] [place], [place] [id number]        Dear YONATHAN,    Thank you for selecting [facility] for your healthcare needs.    Our records indicate you are due for the following services:     Annual Physical     Fasting Lab Tests ~ Please do not eat or drink anything 10 hours prior to your scheduled appointment time.  (Water and any medications that you may need are allowed unless directed otherwise.)     If you had your labs done at another facility or with Direct Access Lab Testing at [facility], please bring in a copy of the results to your next visit, mail a copy, or drop off a copy of your results to your Healthcare Provider.     (FYI   Regarding office visits: In some instances, a video visit or telephone visit may be offered as an option.)    To schedule an appointment or if you have further questions, please contact your clinic at [phone].    Powered by [employer]    Sincerely,    Aiden Drummond MD, FACP

## 2022-02-15 NOTE — NURSING NOTE
Quick Intake Entered On:  6/23/2021 8:21 AM CDT    Performed On:  6/23/2021 8:21 AM CDT by Shayla Guillen RN               Summary   Chief Complaint :   Bp check   Height Measured :   67.75 in(Converted to: 5 ft 8 in, 172.08 cm)    Systolic Blood Pressure :   120 mmHg   Diastolic Blood Pressure :   78 mmHg   Mean Arterial Pressure :   92 mmHg   Peripheral Pulse Rate :   87 bpm   BP Site :   Right arm   BP Method :   Electronic   HR Method :   Electronic   Shayla Guillen RN - 6/23/2021 8:21 AM CDT  
5

## 2022-02-15 NOTE — NURSING NOTE
Quick Intake Entered On:  12/15/2020 8:40 AM CST    Performed On:  12/15/2020 8:34 AM CST by Lupe Blackburn RN               Summary   Chief Complaint :   BP check   Systolic Blood Pressure :   142 mmHg (HI)    Diastolic Blood Pressure :   85 mmHg (HI)    Mean Arterial Pressure :   104 mmHg   Peripheral Pulse Rate :   72 bpm   Vital Signs Comments :   Declines recheck   BP Site :   Right arm   Pulse Site :   Brachial artery   BP Method :   Electronic   HR Method :   Electronic   Lupe Blackburn RN - 12/15/2020 8:39 AM CST

## 2022-02-15 NOTE — NURSING NOTE
Depression Screening Entered On:  12/28/2020 3:58 PM CST    Performed On:  12/28/2020 3:57 PM CST by Caroline Tadeo               Depression Screening   Little Interest - Pleasure in Activities :   Not at all   Feeling Down, Depressed, Hopeless :   Not at all   Initial Depression Screen Score :   0 Score   Poor Appetite or Overeating :   Not at all   Trouble Falling or Staying Asleep :   Not at all   Feeling Tired or Little Energy :   Not at all   Feeling Bad About Yourself :   Not at all   Trouble Concentrating :   Not at all   Moving or Speaking Slowly :   Not at all   Thoughts Better Off Dead or Hurting Self :   Not at all   BEENA Difficulty with Work, Home, Others :   Not difficult at all   Detailed Depression Screen Score :   0    Total Depression Screen Score :   0    Caroline Tadeo - 12/28/2020 3:57 PM CST

## 2022-02-15 NOTE — PROGRESS NOTES
Patient:   YONATHAN KENT            MRN: [id number]            FIN: [id number]               Age:   65 years     Sex:  Male     :  1955   Associated Diagnoses:   Diabetes mellitus; HTN (hypertension); Wellness examination; Dyslipidemia   Author:   Aiden Drummond MD      Visit Information   Visit type:  Annual exam.    Accompanied by:  No one.    Source of history:  Self.    History limitation:  None.       Chief Complaint   2020 3:02 PM CST   Annual physical and follow up labs      History of Present Illness     The patient is here for a wellness visit.  He is overall quite satisfied with his health.  He did not start pravastatin nor increase metformin or lisinopril/hydrochlorothiazide as we discussed at his last visit but tells me he is willing to do so at this time.       Health History is reviewed and updated, unchanged.      On complete review of systems he wears glasses, brushes and flosses daily, and has high blood pressure.  Complete review of systems otherwise negative.       PHQ-9 score is 0.           Review of Systems   See HPI       Health Status   Allergies:    Allergic Reactions (Selected)  No Known Medication Allergies   Medications:  (Selected)   Prescriptions  Prescribed  [facility] Prescription Supply: accucheck umu test strips, See Instructions, Instructions: test daily, Supply, # 100 EA, 3 Refill(s), Type: Maintenance, Pharmacy: [facility], resending since accucheck umu test strips was missing from original order you received, test daily  Pravachol 40 mg oral tablet: = 1 tab(s) ( 40 mg ), Oral, daily, # 90 tab(s), 3 Refill(s), Type: Maintenance, Pharmacy: [facility], 1 tab(s) Oral daily, 67.75, in, 20 15:02:00 CST, Height Measured, 184, lb, 20 15:02:00 CST, Weight Measured  hydrochlorothiazide-lisinopril 12.5 mg-20 mg oral tablet: 2 tab(s), PO, Daily, # 180 tab(s), 3 Refill(s), Type: Maintenance, Pharmacy: [facility], 2 tab(s) Oral daily,x90 day(s), 67.75, in, 20  15:02:00 CST, Height Measured, 184, lb, 12/28/20 15:02:00 CST, Weight Measured  metFORMIN 500 mg oral tablet, extended release: = 4 tab(s) ( 2,000 mg ), po, daily, # 360 tab(s), 3 Refill(s), Type: Maintenance, Pharmacy: [facility], 4 tab(s) Oral daily,x90 day(s), 67.75, in, 12/28/20 15:02:00 CST, Height Measured, 184, lb, 12/28/20 15:02:00 CST, Weight Measured,    Medications          *denotes recorded medication          [facility] Prescription Supply: See Instructions, test daily, 100 EA, 3 Refill(s).          hydrochlorothiazide-lisinopril 12.5 mg-20 mg oral tablet: 2 tab(s), PO, Daily, for 90 day(s), 180 tab(s), 3 Refill(s).          metFORMIN 500 mg oral tablet, extended release: 2,000 mg, 4 tab(s), po, daily, for 90 day(s), 360 tab(s), 3 Refill(s).          Pravachol 40 mg oral tablet: 40 mg, 1 tab(s), Oral, daily, 90 tab(s), 3 Refill(s).       Problem list:    All Problems  Diabetes mellitus / SNOMED CT 649990572 / Confirmed  Dyslipidemia / SNOMED CT 3415330298 / Confirmed  Hearing loss / SNOMED CT 81179986 / Confirmed  Hematuria, microscopic / SNOMED CT 838809249 / Confirmed  HTN (hypertension) / SNOMED CT 6162867837 / Confirmed  Canceled: HTN, goal below 140/90 / SNOMED CT 5187973062      Histories   Past Medical History:    Active  Diabetes mellitus (788251319): Onset on 1/1/2007 at 51 years.  HTN (hypertension) (9147622722): Onset on 1/1/2000 at 44 years.  Hearing loss (70782976)  Hematuria, microscopic (508131088)  Comments:  8/1/2013 CDT 12:07 PM CDT - Didi Fernandez  Microscopic    10/22/2015 CDT 3:21 PM CDT - Aiden Drummond MD  Negative workup   Family History:    CA - Lung cancer  Father: onset at 79 years.  Diabetes mellitus type 2  Father     Procedure history:    Screening for colon cancer (1730638366) on 12/10/2018 at 63 Years.  Comments:  12/20/2018 1:48 PM KIARRA - [place] RO, Didi  Cologleta result:   negative  Recommendation:  f/u 3yrs  Screening for malignant neoplasm of colon (6826114938) on  11/18/2015 at 60 Years.  Comments:  12/21/2015 11:48 AM CST - Idalmis Churchill is negative  Cystoscopy (16074397) on 7/13/2000 at 44 Years.   Social History:        Electronic Cigarette/Vaping Assessment            Electronic Cigarette Use: Never.      Alcohol Assessment            Current, Beer (12 oz), 5-6 times per week      Tobacco Assessment            Never (less than 100 in lifetime)      Substance Abuse Assessment            Never      Employment and Education Assessment            Employed, Work/School description: College Prof..      Home and Environment Assessment            Marital status: .      Exercise and Physical Activity Assessment            Exercise frequency: 5-6 times/week.  Exercise type: Running.      Sexual Assessment            Identifies as male, Sexual orientation: Straight or heterosexual.        Physical Examination   Vital Signs   12/28/2020 3:02 PM CST Temperature Tympanic 97.6 DegF  LOW    Peripheral Pulse Rate 76 bpm    Pulse Site Radial artery    HR Method Manual    Systolic Blood Pressure 134 mmHg  HI    Diastolic Blood Pressure 84 mmHg  HI    Mean Arterial Pressure 101 mmHg    BP Site Right arm    BP Method Manual      Measurements from flowsheet : Measurements   12/28/2020 3:02 PM CST Height Measured - Standard 67.75 in    Weight Measured - Standard 184 lb    BSA 2 m2    Body Mass Index 28.18 kg/m2  HI      Documented vital signs:       Blood Pressure: Systolic  142  mmHg, Diastolic  80  mmHg, Site ( Right arm ), Cuff ( Adult ).    General:  Alert and oriented, No acute distress.    Eye:  Normal conjunctiva.    HENT:  Normocephalic, Normal hearing, Oral mucosa is moist, No pharyngeal erythema, Hearing aide left ear.    Neck:  Supple, Non-tender, No carotid bruit, No lymphadenopathy, No thyromegaly.    Respiratory:  Lungs are clear to auscultation, Respirations are non-labored.    Cardiovascular:  Normal rate, Regular rhythm, No murmur, No gallop, Normal  peripheral perfusion, No edema.    Gastrointestinal:  Soft, Non-tender, Non-distended, Normal bowel sounds, No organomegaly.    Genitourinary:       Prostate: Declined after a discussion.    Lymphatics:  No lymphadenopathy neck, axilla, groin.    Musculoskeletal:  No deformity, Normal gait.    Feet:  Normal by visual exam, Normal pulses, Sensation intact, By monofilament exam, By vibration.    Integumentary:  No pallor.    Neurologic:  No focal deficits.    Cognition and Speech:  Oriented, Speech clear and coherent, Functional cognition intact.    Psychiatric:  Cooperative, Appropriate mood & affect, Normal judgment, Non-suicidal.       Review / Management   Results review:  Lab results   12/15/2020 12:00 AM CST Sodium Level 136 mmol/L    Potassium Level 4.0 mmol/L    Chloride Level 101 mmol/L    CO2 Level 26 mmol/L    Glucose Level 136 mg/dL  HI    BUN 17 mg/dL    Creatinine Level 0.71 mg/dL    BUN/Creat Ratio NOT APPLICABLE    eGFR 100 mL/min/1.73m2    eGFR African American 116 mL/min/1.73m2    Calcium Level 9.0 mg/dL    Hgb A1c 7.5  HI    Cholesterol 147 mg/dL    Non-HDL Cholesterol 99    HDL 48 mg/dL    Cholesterol/HDL Ratio 3.1    LDL 76    Triglyceride 148 mg/dL    PSA 0.9 ng/mL    U Creatinine 99 mg/dL    U Microalbumin 0.3 mg/dL    Ur Microalbumin/Creatinine Ratio 3   .       Impression and Plan   Diagnosis     Diabetes mellitus (EMZ89-JD E11.9).     HTN (hypertension) (WDI04-RM I10).     Wellness examination (AYP67-RH Z00.00).     Dyslipidemia (VMG76-FC E78.5).     Course:  Incomplete response to treatment.    Patient Instructions:       Counseled: Regarding medications, Diet, Activity, Verbalized understanding.    Orders     Orders (Selected)   Outpatient Orders  Ordered  Return to Clinic (Request): RFV: Cologuard.   Dx:  CRC screening, Return in 3yrs  Return to Clinic (Request): RFV: Hgb A1c, lipids, Return in 3 months  Return to Clinic (Request): RFV: Standing Orders  Return to Clinic (Request): RFV:  Wellness, Return in 12 months, Instructions: Blood work before visit.  Prescriptions  Prescribed  [facility] Prescription Supply: accucheck umu test strips, See Instructions, Instructions: test daily, Supply, # 100 EA, 3 Refill(s), Type: Maintenance, Pharmacy: [facility], resending since accucheck umu test strips was missing from original order you received, test daily  Pravachol 40 mg oral tablet: = 1 tab(s) ( 40 mg ), Oral, daily, # 90 tab(s), 3 Refill(s), Type: Maintenance, Pharmacy: [facility], 1 tab(s) Oral daily, 67.75, in, 12/28/20 15:02:00 CST, Height Measured, 184, lb, 12/28/20 15:02:00 CST, Weight Measured  hydrochlorothiazide-lisinopril 12.5 mg-20 mg oral tablet: 2 tab(s), PO, Daily, # 180 tab(s), 3 Refill(s), Type: Maintenance, Pharmacy: [facility], 2 tab(s) Oral daily,x90 day(s), 67.75, in, 12/28/20 15:02:00 CST, Height Measured, 184, lb, 12/28/20 15:02:00 CST, Weight Measured  metFORMIN 500 mg oral tablet, extended release: = 4 tab(s) ( 2,000 mg ), po, daily, # 360 tab(s), 3 Refill(s), Type: Maintenance, Pharmacy: [facility], 4 tab(s) Oral daily,x90 day(s), 67.75, in, 12/28/20 15:02:00 CST, Height Measured, 184, lb, 12/28/20 15:02:00 CST, Weight Measured.     Annual Flu.     Cologuard in 2021.

## 2022-02-15 NOTE — PROGRESS NOTES
Patient:   YONATHAN KENT            MRN: [id number]            FIN: [id number]               Age:   62 years     Sex:  Male     :  1955   Associated Diagnoses:   Diabetes Mellitus; Hearing Loss; HTN, goal below 140/90; Medicare annual wellness visit, subsequent   Author:   Aiden Drummond MD      Visit Information   Visit type:  Annual exam.    Accompanied by:  No one.    Source of history:  Self.    History limitation:  None.       Chief Complaint   10/26/2017 2:58 PM CDT   physical      History of Present Illness    The patient has been well.  He has been monitoring his blood glucose and it has been quite good.  He has a history of Type 2 diabetes, hearing loss, microscopic hematuria, and hypertension.  His hematuria was last worked up a number of years ago and he was told his amount of hematuria fell below guideline levels and did require follow up.     I discussed with him starting a statin and intensifying his diabetes medications and he is for that.     No complaints on complete review of systems.     He travels in [place] and [place] frequently.  His mother still lives in [place].    He had Cologuard in .     PHQ-9 score is 0.           Review of Systems   See HPI       Health Status   Allergies:    Allergic Reactions (Selected)  No known allergies   Medications:  (Selected)   Prescriptions  Prescribed  Januvia 100 mg oral tablet: 1 tab(s) ( 100 mg ), po, daily, # 90 tab(s), 3 Refill(s), Type: Maintenance, Pharmacy: [facility], 1 tab(s) po daily,x90 day(s)  [facility] [place]: accucheck umu test strips, See Instructions, Instructions: test daily, Supply, # 100 EA, 3 Refill(s), Type: Maintenance, Pharmacy: [facility], test daily  Pravachol 20 mg oral tablet: 1 tab(s) ( 20 mg ), PO, Daily, # 90 tab(s), 3 Refill(s), Type: Maintenance, Pharmacy: [facility], 1 tab(s) po daily  hydrochlorothiazide-lisinopril 12.5 mg-10 mg oral tablet: 1  tab(s), po, daily, # 90 tab(s), 3 Refill(s), Type: Maintenance, Pharmacy: [facility], 1 tab(s) po daily  metFORMIN 500 mg oral tablet, extended release: 2 tab(s) ( 1,000 mg ), po, daily, # 180 tab(s), 3 Refill(s), Type: Maintenance, Pharmacy: [facility], 2 tab(s) po daily,x90 day(s)   Problem list:    All Problems  Diabetes Mellitus / ICD-9-.00 / Confirmed  Hearing Loss / ICD-9-.9 / Confirmed  HTN (hypertension) / SNOMED CT 8726616885 / Confirmed  Hematuria, microscopic / SNOMED CT 398971753 / Confirmed  Canceled: HTN, goal below 140/90 / SNOMED CT 8897724563      Histories   Past Medical History:    Active  Diabetes Mellitus (250.00): Onset on 1/1/2007 at 51 years.  HTN (hypertension) (2723353001): Onset on 1/1/2000 at 44 years.  Hearing Loss (389.9)  Hematuria, microscopic (127206388)  Comments:  8/1/2013 CDT 12:07 PM CDT - Didi Fernandez  Microscopic    10/22/2015 CDT 3:21 PM CDT - Aiden Drummond MD  Negative workup   Family History:    CA - Lung cancer  Father: onset at 79 years.  Diabetes mellitus type 2  Father     Procedure history:    Screening for malignant neoplasm of colon (3761414345) on 11/18/2015 at 60 Years.  Comments:  12/21/2015 11:48 AM - Idalmis Churchill is negative  Cystoscopy (90744143) on 7/13/2000 at 44 Years.   Social History:        Alcohol Assessment: Current            Current, Beer, 1-2 times per week, 2 drinks/episode average.      Tobacco Assessment: Denies Tobacco Use      Employment and Education Assessment            Employed, Work/School description: College Prof..      Exercise and Physical Activity Assessment            Exercise frequency: Daily.                     Comments:                      10/22/2015 - Aiden Drummond MD                     Runs 6 miles      Other Assessment            Marital status,         Physical Examination   Vital Signs   10/26/2017 2:58 PM CDT Peripheral Pulse Rate 84 bpm     Systolic Blood Pressure 125 mmHg    Diastolic Blood Pressure 78 mmHg    Mean Arterial Pressure 94 mmHg    BP Site Right arm      Measurements from flowsheet : Measurements   10/26/2017 2:58 PM CDT Height Measured - Standard 67.75 in    Weight Measured - Standard 178 lb    BSA 1.96 m2    Body Mass Index 27.26 kg/m2      General:  Alert and oriented, No acute distress.    Eye:  Normal conjunctiva.    HENT:  Normocephalic, Normal hearing, Oral mucosa is moist, No pharyngeal erythema, Hearing aide left ear.    Neck:  Supple, Non-tender, No carotid bruit, No lymphadenopathy, No thyromegaly.    Respiratory:  Lungs are clear to auscultation, Respirations are non-labored.    Cardiovascular:  Normal rate, Regular rhythm, No murmur, No gallop, Normal peripheral perfusion, No edema.    Gastrointestinal:  Soft, Non-tender, Non-distended, Normal bowel sounds, No organomegaly.    Genitourinary:       Prostate: Symmetric, Enlarged  2 /4, Not nodular.    Lymphatics:  No lymphadenopathy neck, axilla, groin.    Musculoskeletal:  No deformity, Normal gait.    Feet:  Normal by visual exam, Normal pulses, Sensation intact, By monofilament exam.    Integumentary:  No pallor.    Neurologic:  No focal deficits.    Cognition and Speech:  Oriented, Speech clear and coherent, Functional cognition intact.    Psychiatric:  Cooperative, Appropriate mood & affect, Normal judgment, Non-suicidal.       Review / Management   Results review:  Lab results   10/16/2017 8:12 AM CDT Sodium Level 138 mmol/L    Potassium Level 4.2 mmol/L    Chloride Level 104 mmol/L    CO2 Level 26 mmol/L    Glucose Level 158 mg/dL  HI    BUN 14 mg/dL    Creatinine Level 0.53 mg/dL  LOW    BUN/Creat Ratio 26  HI    eGFR 115 mL/min/1.73m2    eGFR African American 133 mL/min/1.73m2    Calcium Level 8.8 mg/dL    Hgb A1c 7.4  HI    Cholesterol 143 mg/dL    Non-HDL Cholesterol 96    HDL 47 mg/dL    Cholesterol/HDL Ratio 3.0    LDL 74    Triglyceride 135 mg/dL    PSA 0.9 ng/mL     U Microalbumin 2.2 mg/dL    Ur Creatinine 154 mg/dL    Ur Microalbumin/Creatinine Ratio 14   .       Impression and Plan   Diagnosis     Diabetes Mellitus (EXM28-AT E11.9).     Hearing Loss (GUP86-FY H91.90).     HTN, goal below 140/90 (FID34-SU I10).     Medicare annual wellness visit, subsequent (YNV85-JZ Z00.00).     Patient Instructions:       Counseled: Diet, Activity, Verbalized understanding.    Summary:  HGB A1c above goal. Not on statin..    Orders     Orders (Selected)   Outpatient Orders  Ordered  Return to Clinic (Request): RFV: Standing Orders  Return to Clinic (Request): RFV: Wellness, Return in 12 months, Instructions: Blood work before visit.  Return to Clinic (Request): RFV: alt, lipids, Hgb A1c, Return in 3 months  Completed  influenza virus vaccine, inactivated preservative-free quadrivalent intramuscular suspension: 0.5 mL, im, once  Prescriptions  Prescribed  Januvia 100 mg oral tablet: 1 tab(s) ( 100 mg ), po, daily, # 90 tab(s), 3 Refill(s), Type: Maintenance, Pharmacy: [facility], 1 tab(s) po daily,x90 day(s)  [facility] Prescription Supply: accucheck umu test strips, See Instructions, Instructions: test daily, Supply, # 100 EA, 3 Refill(s), Type: Maintenance, Pharmacy: [facility], test daily  Pravachol 20 mg oral tablet: 1 tab(s) ( 20 mg ), PO, Daily, # 90 tab(s), 3 Refill(s), Type: Maintenance, Pharmacy: [facility], 1 tab(s) po daily  hydrochlorothiazide-lisinopril 12.5 mg-10 mg oral tablet: 1 tab(s), po, daily, # 90 tab(s), 3 Refill(s), Type: Maintenance, Pharmacy: [facility], 1 tab(s) po daily  metFORMIN 500 mg oral tablet, extended release: 2 tab(s) ( 1,000 mg ), po, daily, # 180 tab(s), 3 Refill(s), Type: Maintenance, Pharmacy: [facility], 2 tab(s) po daily,x90 day(s).     Annual Flu.     Cologuard in 2018.     Review last Urology workup.

## 2022-02-15 NOTE — LETTER
(Inserted Image. Unable to display)   December 17, 2020      YONATHAN KENT      [address], [place] [id number]        Dear YONATHAN,    Thank you for selecting [facility] (previously [facility]) for your healthcare needs.  Below you will find the results of your recent tests done at our clinic.     Diabetes not as well controlled as we would like with Hgb A1c >7.0. Please schedule an appointment.      Result Name Current Result Previous Result Reference Range   Sodium Level (mmol/L)  136 12/15/2020  137 11/9/2019 135 - 146   Potassium Level (mmol/L)  4.0 12/15/2020  4.0 11/9/2019 3.5 - 5.3   Chloride Level (mmol/L)  101 12/15/2020  103 11/9/2019 98 - 110   CO2 Level (mmol/L)  26 12/15/2020  26 11/9/2019 20 - 32   Glucose Level (mg/dL) ((H)) 136 12/15/2020 ((H)) 141 11/9/2019 65 - 99   BUN (mg/dL)  17 12/15/2020  19 11/9/2019 7 - 25   Creatinine Level (mg/dL)  0.71 12/15/2020 ((L)) 0.65 11/9/2019 0.70 - 1.25   eGFR (mL/min/1.73m2)  100 12/15/2020  104 11/9/2019 > OR = 60 -    Calcium Level (mg/dL)  9.0 12/15/2020  8.9 11/9/2019 8.6 - 10.3   Hgb A1c ((H)) 7.5 12/15/2020 ((H)) 7.8 11/9/2019  - <5.7   Cholesterol (mg/dL)  147 12/15/2020  154 11/9/2019  - <200   Non-HDL Cholesterol  99 12/15/2020  102 11/9/2019  - <130   HDL (mg/dL)  48 12/15/2020  52 11/9/2019 > OR = 40 -    Cholesterol/HDL Ratio  3.1 12/15/2020  3.0 11/9/2019  - <5.0   LDL  76 12/15/2020  79 11/9/2019    Triglyceride (mg/dL)  148 12/15/2020  125 11/9/2019  - <150   PSA (ng/mL)  0.9 12/15/2020  1.0 11/9/2019  - < OR = 4.0   Ur Microalbumin/Creatinine Ratio  3 12/15/2020  5 11/9/2019  - <30       Please contact me or my assistant at [phone] if you have any questions.     Sincerely,        Aiden Drummond MD

## 2022-02-15 NOTE — PROGRESS NOTES
Patient:   YONATHAN KENT            MRN: [id number]            FIN: [id number]               Age:   64 years     Sex:  Male     :  1955   Associated Diagnoses:   Diabetes mellitus; HTN (hypertension); Wellness examination   Author:   Aiden Drummond MD      Visit Information   Visit type:  Annual exam.    Accompanied by:  No one.    Source of history:  Self.    History limitation:  None.       Chief Complaint   11/15/2019 9:40 AM CST   Patient is here today for annual px      History of Present Illness    The patient is a 63-year-old with diabetes, hypertension, and dyslipidemia here for a wellness visit.     No changes in health history.   GDS (short form) score is 0.     On complete review of systems he has no complaints.     I reviewed his laboratory data.  He now feels like he could tolerate four metformin tablets a day whereas, in the past it was only two.           Review of Systems   See HPI       Health Status   Allergies:    Allergic Reactions (Selected)  No Known Medication Allergies   Medications:  (Selected)   Prescriptions  [place]  [facility] [place] accucheck umu test strips, See Instructions, Instructions: test daily, Supply, # 100 EA, 3 Refill(s), Type: Maintenance, Pharmacy: [facility], resending since accucheck umu test strips was missing from original order you received, test daily  Pravachol 40 mg oral tablet: = 1 tab(s) ( 40 mg ), Oral, daily, # 90 tab(s), 3 Refill(s), Type: Maintenance, Pharmacy: [facility], 1 tab(s) Oral daily  dapagliflozin 10 mg oral tablet: = 1 tab(s) ( 10 mg ), Oral, daily, # 30 tab(s), 0 Refill(s), Type: Maintenance, Pharmacy: [facility], 1 tab(s) Oral daily  hydrochlorothiazide-lisinopril 12.5 mg-20 mg oral tablet: 2 tab(s), PO, Daily, # 180 tab(s), 3 Refill(s), Type: Maintenance, Pharmacy: [facility], 2 tab(s) Oral daily,x90 day(s)  metFORMIN 500 mg oral tablet, extended release: = 4 tab(s) ( 2,000 mg ), po, daily, # 360 tab(s), 3 Refill(s),  Type: Maintenance, Pharmacy: [facility], 4 tab(s) Oral daily,x90 day(s),    Medications          *denotes recorded medication          Misc Prescription Supply: See Instructions, test daily, 100 EA, 3 Refill(s).          dapagliflozin 10 mg oral tablet: 10 mg, 1 tab(s), Oral, daily, 30 tab(s), 0 Refill(s).          hydrochlorothiazide-lisinopril 12.5 mg-20 mg oral tablet: 2 tab(s), PO, Daily, for 90 day(s), 180 tab(s), 3 Refill(s).          metFORMIN 500 mg oral tablet, extended release: 2,000 mg, 4 tab(s), po, daily, for 90 day(s), 360 tab(s), 3 Refill(s).          Pravachol 40 mg oral tablet: 40 mg, 1 tab(s), Oral, daily, 90 tab(s), 3 Refill(s).       Problem list:    All Problems  Diabetes mellitus / SNOMED CT 110830560 / Confirmed  Hearing loss / SNOMED CT 70631732 / Confirmed  Hematuria, microscopic / SNOMED CT 923836404 / Confirmed  HTN (hypertension) / SNOMED CT 7388537899 / Confirmed  Canceled: HTN, goal below 140/90 / SNOMED CT 3971844664      Histories   Past Medical History:    Active  Diabetes mellitus (687967379): Onset on 1/1/2007 at 51 years.  HTN (hypertension) (3104030326): Onset on 1/1/2000 at 44 years.  Hearing loss (41839002)  Hematuria, microscopic (186599973)  Comments:  8/1/2013 CDT 12:07 PM CDT - Didi Fernandez  Microscopic    10/22/2015 CDT 3:21 PM CDT - Aiden Drummond MD  Negative workup   Family History:    CA - Lung cancer  Father: onset at 79 years.  Diabetes mellitus type 2  Father     Procedure history:    Screening for colon cancer (5547284762) on 12/10/2018 at 63 Years.  Comments:  12/20/2018 1:48 PM Didi Luther MA result:   negative  Recommendation:  f/u 3yrs  Screening for malignant neoplasm of colon (2193365514) on 11/18/2015 at 60 Years.  Comments:  12/21/2015 11:48 AM KIARRA - Idalmis Churchill is negative  Cystoscopy (91543673) on 7/13/2000 at 44 Years.   Social History:        Alcohol Assessment            Current, Beer (12 oz), 5-6 times per  week      Tobacco Assessment            Never (less than 100 in lifetime)      Substance Abuse Assessment            Never      Employment and Education Assessment            Employed, Work/School description: College Prof..      Home and Environment Assessment            Marital status: .      Exercise and Physical Activity Assessment            Exercise frequency: 5-6 times/week.  Exercise type: Running.      Sexual Assessment            Identifies as male, Sexual orientation: Straight or heterosexual.        Physical Examination   Vital Signs   11/15/2019 9:52 AM CST Systolic Blood Pressure 136 mmHg  HI    Diastolic Blood Pressure 68 mmHg    Mean Arterial Pressure 91 mmHg    BP Site Right arm    BP Method Manual   11/15/2019 9:40 AM CST Temperature Tympanic 97.8 DegF  LOW    Peripheral Pulse Rate 96 bpm    HR Method Electronic    Systolic Blood Pressure 138 mmHg  HI    Diastolic Blood Pressure 72 mmHg    Mean Arterial Pressure 94 mmHg    BP Site Right arm    BP Method Manual      Measurements from flowsheet : Measurements   11/15/2019 9:52 AM CST Height Measured - Standard 67.75 in   11/15/2019 9:40 AM CST Height Measured - Standard 67.75 in    Weight Measured - Standard 176.0 lb    BSA 1.95 m2    Body Mass Index 26.96 kg/m2  HI      General:  Alert and oriented, No acute distress.    Eye:  Normal conjunctiva.    HENT:  Normocephalic, Normal hearing, Oral mucosa is moist, No pharyngeal erythema, Hearing aide left ear.    Neck:  Supple, Non-tender, No carotid bruit, No lymphadenopathy, No thyromegaly.    Respiratory:  Lungs are clear to auscultation, Respirations are non-labored.    Cardiovascular:  Normal rate, Regular rhythm, No murmur, No gallop, Normal peripheral perfusion, No edema.    Gastrointestinal:  Soft, Non-tender, Non-distended, Normal bowel sounds, No organomegaly.    Genitourinary:       Prostate: Declined after a discussion.    Lymphatics:  No lymphadenopathy neck, axilla, groin.     Musculoskeletal:  No deformity, Normal gait.    Feet:  Normal by visual exam, Normal pulses, Sensation intact, By monofilament exam, By vibration.    Integumentary:  No pallor.    Neurologic:  No focal deficits.    Cognition and Speech:  Oriented, Speech clear and coherent, Functional cognition intact.    Psychiatric:  Cooperative, Appropriate mood & affect, Normal judgment, Non-suicidal.       Review / Management   Results review:  Lab results   11/9/2019 8:27 AM CST Sodium Level 137 mmol/L    Potassium Level 4.0 mmol/L    Chloride Level 103 mmol/L    CO2 Level 26 mmol/L    Glucose Level 141 mg/dL  HI    BUN 19 mg/dL    Creatinine Level 0.65 mg/dL  LOW    BUN/Creat Ratio 29  HI    eGFR 104 mL/min/1.73m2    eGFR African American 121 mL/min/1.73m2    Calcium Level 8.9 mg/dL    Hgb A1c 7.8  HI    Cholesterol 154 mg/dL    Non-HDL Cholesterol 102    HDL 52 mg/dL    Cholesterol/HDL Ratio 3.0    LDL 79    Triglyceride 125 mg/dL    PSA 1.0 ng/mL    U Microalbumin 0.6 mg/dL    Ur Creatinine 116 mg/dL    Ur Microalbumin/Creatinine Ratio 5     .       Impression and Plan   Diagnosis     Diabetes mellitus (GEE63-OU E11.9).     HTN (hypertension) (MZA32-ZF I10).     Wellness examination (AZO93-GX Z00.00).     Patient Instructions:       Counseled: Diet, Activity, Verbalized understanding.    Summary:  BP and A1c Above goal. Increase Lisinopril HCTZ, Pravastatin, Metformin and Farxiga.    Orders     Orders (Selected)   Outpatient Orders  Ordered  Return to Clinic (Request): RFV: BP check with CSS, Return in 1 month  Return to Clinic (Request): RFV: Cologuard.   Dx:  CRC screening, Return in 3yrs  Return to Clinic (Request): RFV: Hgb A1c, Lipids, BMP, Return in 3 months  Return to Clinic (Request): RFV: Standing Orders  Return to Clinic (Request): RFV: Wellness, Return in 12 months, Instructions: Blood work before visit.  Prescriptions  Prescribed  [facility] [place] Prescription Supply: Epic[employer] umu test strips, See Instructions,  Instructions: test daily, Supply, # 100 EA, 3 Refill(s), Type: Maintenance, Pharmacy: [facility], resending since accucheck umu test strips was missing from original order you received, test daily  Pravachol 40 mg oral tablet: = 1 tab(s) ( 40 mg ), Oral, daily, # 90 tab(s), 3 Refill(s), Type: Maintenance, Pharmacy: [facility], 1 tab(s) Oral daily  dapagliflozin 10 mg oral tablet: = 1 tab(s) ( 10 mg ), Oral, daily, # 30 tab(s), 0 Refill(s), Type: Maintenance, Pharmacy: [facility], 1 tab(s) Oral daily  hydrochlorothiazide-lisinopril 12.5 mg-20 mg oral tablet: 2 tab(s), PO, Daily, # 180 tab(s), 3 Refill(s), Type: Maintenance, Pharmacy: [facility], 2 tab(s) Oral daily,x90 day(s)  metFORMIN 500 mg oral tablet, extended release: = 4 tab(s) ( 2,000 mg ), po, daily, # 360 tab(s), 3 Refill(s), Type: Maintenance, Pharmacy: [facility], 4 tab(s) Oral daily,x90 day(s).     Annual Flu.     Cologuard.

## 2022-02-15 NOTE — LETTER
(Inserted Image. Unable to display)       [address] [place] [address]  December 30, 2021      YONATHAN KENT      [address] [place] [address]        Dear YONATHAN,    Thank you for selecting [facility] for your healthcare needs.  Below you will find the results of your recent tests done at our clinic.     Hemoglobin A1c improved but still above goal of <7. Please schedule an appointment.      Result Name Current Result Previous Result Reference Range   Ur Microalbumin/Creatinine Ratio  4 12/29/2021  3 12/15/2020  - <30   PSA (ng/mL)  1.16 12/29/2021  0.9 12/15/2020  - < OR = 4.00   Glucose Level (mg/dL) ((H)) 129 12/29/2021 ((H)) 136 12/15/2020 65 - 99   BUN (mg/dL)  20 12/29/2021  17 12/15/2020 7 - 25   Creatinine Level (mg/dL)  0.71 12/29/2021  0.71 12/15/2020 0.70 - 1.25   eGFR (mL/min/1.73m2)  99 12/29/2021  100 12/15/2020 > OR = 60 -    BUN/Creat Ratio  NOT APPLICABLE 12/29/2021  NOT APPLICABLE 12/15/2020 6 - 22   Sodium Level (mmol/L)  138 12/29/2021  136 12/15/2020 135 - 146   Potassium Level (mmol/L)  4.1 12/29/2021  4.0 12/15/2020 3.5 - 5.3   Chloride Level (mmol/L)  102 12/29/2021  101 12/15/2020 98 - 110   CO2 Level (mmol/L)  26 12/29/2021  26 12/15/2020 20 - 32   Calcium Level (mg/dL)  9.3 12/29/2021  9.0 12/15/2020 8.6 - 10.3   Hgb A1c ((H)) 7.7 12/29/2021 ((H)) 8.3 6/23/2021  - <5.7       Please contact me or my assistant at [phone] if you have any questions.     Sincerely,        Aiden Drummond MD  
stated

## 2022-02-15 NOTE — LETTER
(Inserted Image. Unable to display)       [address] [place] [address]  June 24, 2021      YONATHAN KENT      [address] [place] [address]        Dear YONATHAN,    Thank you for selecting [facility] for your healthcare needs.  Below you will find the results of your recent tests done at our clinic.     Diabetes not well controlled. Please schedule an appointment.      Result Name Current Result Previous Result Reference Range   Hgb A1c ((H)) 8.3 6/23/2021 ((H)) 7.5 12/15/2020  - <5.7   Cholesterol (mg/dL)  116 6/23/2021  147 12/15/2020  - <200   Non-HDL Cholesterol  73 6/23/2021  99 12/15/2020  - <130   HDL (mg/dL)  43 6/23/2021  48 12/15/2020 > OR = 40 -    Cholesterol/HDL Ratio  2.7 6/23/2021  3.1 12/15/2020  - <5.0   LDL  47 6/23/2021  76 12/15/2020    Triglyceride (mg/dL) ((H)) 181 6/23/2021  148 12/15/2020  - <150       Please contact me or my assistant at [phone] if you have any questions.     Sincerely,        Aiden Drummond MD

## 2022-02-15 NOTE — LETTER
(Inserted Image. Unable to display)   December 29, 2021    YONATHAN KENT  [address] [place] [address]            Dear YONATHAN,      Thank you for selecting [facility] for your healthcare needs.    Our records indicate you are due for the following services:    Annual Physical.   Urine Labs ~ Please be prepared to leave a urine specimen for evaluation.  Non-Fasting Labs.    If you had your labs done at another facility or with [facility] at [facility], please bring in a copy of the results to your next visit, mail a copy, or drop off a copy of your results to your Healthcare Provider.    (FYI   Regarding office visits: In some instances, a video visit or telephone visit may be offered as an option.)    You are due for lab work and an office visit; please schedule the lab appointment 1 week before the office visit.  This will assure all results are available to discuss with your Healthcare Provider during your visit.    **It is very helpful if you bring your medication bottles to your appointment.  This assures we have all of your current medications, including strength and dosing information, documented accurately in your medical record.    To schedule an appointment or if you have further questions, please contact your clinic at [phone].      Powered by [employer] and [employer]    Sincerely,    Aiden Drummond MD, FACP

## 2022-02-15 NOTE — CARE COORDINATION
Pt appears on [facility] chronic disease panel as out of parameters for A1C.  Pt has chart alert saying he will only come in yearly for physical and labs, nothing more.  Pt has appt 11/9/18 for AWV, lab done 11/5/2018 A1C 8.1. Wait for appt to check RTC

## 2022-03-07 PROBLEM — E78.5 DYSLIPIDEMIA: Status: ACTIVE | Noted: 2022-03-07

## 2022-03-07 PROBLEM — H91.90 HEARING LOSS: Status: ACTIVE | Noted: 2022-03-07

## 2022-03-07 PROBLEM — R31.29 MICROSCOPIC HEMATURIA: Status: ACTIVE | Noted: 2022-03-07

## 2022-03-07 RX ORDER — PRAVASTATIN SODIUM 40 MG
40 TABLET ORAL
COMMUNITY
Start: 2020-12-28 | End: 2022-03-08

## 2022-03-08 ENCOUNTER — OFFICE VISIT (OUTPATIENT)
Dept: FAMILY MEDICINE | Facility: CLINIC | Age: 67
End: 2022-03-08
Payer: COMMERCIAL

## 2022-03-08 VITALS
HEIGHT: 67 IN | HEART RATE: 85 BPM | WEIGHT: 178.8 LBS | OXYGEN SATURATION: 95 % | TEMPERATURE: 97.1 F | SYSTOLIC BLOOD PRESSURE: 132 MMHG | DIASTOLIC BLOOD PRESSURE: 62 MMHG | BODY MASS INDEX: 28.06 KG/M2

## 2022-03-08 DIAGNOSIS — E78.5 DYSLIPIDEMIA: ICD-10-CM

## 2022-03-08 DIAGNOSIS — E11.9 TYPE 2 DIABETES MELLITUS WITHOUT COMPLICATION, WITHOUT LONG-TERM CURRENT USE OF INSULIN (H): ICD-10-CM

## 2022-03-08 DIAGNOSIS — Z12.11 COLON CANCER SCREENING: ICD-10-CM

## 2022-03-08 DIAGNOSIS — I10 PRIMARY HYPERTENSION: ICD-10-CM

## 2022-03-08 DIAGNOSIS — Z00.00 WELLNESS EXAMINATION: Primary | ICD-10-CM

## 2022-03-08 DIAGNOSIS — Z11.59 ENCOUNTER FOR HEPATITIS C SCREENING TEST FOR LOW RISK PATIENT: ICD-10-CM

## 2022-03-08 DIAGNOSIS — R31.29 MICROSCOPIC HEMATURIA: ICD-10-CM

## 2022-03-08 PROCEDURE — 99214 OFFICE O/P EST MOD 30 MIN: CPT | Mod: 25 | Performed by: INTERNAL MEDICINE

## 2022-03-08 PROCEDURE — 36415 COLL VENOUS BLD VENIPUNCTURE: CPT | Performed by: INTERNAL MEDICINE

## 2022-03-08 PROCEDURE — 99396 PREV VISIT EST AGE 40-64: CPT | Performed by: INTERNAL MEDICINE

## 2022-03-08 PROCEDURE — 86803 HEPATITIS C AB TEST: CPT | Performed by: INTERNAL MEDICINE

## 2022-03-08 RX ORDER — PRAVASTATIN SODIUM 40 MG
40 TABLET ORAL DAILY
Qty: 90 TABLET | Refills: 3 | Status: SHIPPED | OUTPATIENT
Start: 2022-03-08 | End: 2023-04-14

## 2022-03-08 RX ORDER — METFORMIN HCL 500 MG
2000 TABLET, EXTENDED RELEASE 24 HR ORAL DAILY
Qty: 360 TABLET | Refills: 4 | Status: SHIPPED | OUTPATIENT
Start: 2022-03-08 | End: 2023-03-31

## 2022-03-08 RX ORDER — LISINOPRIL AND HYDROCHLOROTHIAZIDE 12.5; 2 MG/1; MG/1
1 TABLET ORAL DAILY
Qty: 90 TABLET | Refills: 3 | Status: SHIPPED | OUTPATIENT
Start: 2022-03-08 | End: 2022-03-14

## 2022-03-08 RX ORDER — METFORMIN HCL 500 MG
4 TABLET, EXTENDED RELEASE 24 HR ORAL DAILY
COMMUNITY
Start: 2021-11-24 | End: 2022-03-08

## 2022-03-08 RX ORDER — LISINOPRIL AND HYDROCHLOROTHIAZIDE 12.5; 2 MG/1; MG/1
1 TABLET ORAL DAILY
COMMUNITY
Start: 2021-11-24 | End: 2022-03-08

## 2022-03-08 RX ORDER — LISINOPRIL AND HYDROCHLOROTHIAZIDE 12.5; 2 MG/1; MG/1
1 TABLET ORAL DAILY
Qty: 90 TABLET | Refills: 3 | Status: SHIPPED | OUTPATIENT
Start: 2022-03-08 | End: 2022-03-08

## 2022-03-08 RX ORDER — DAPAGLIFLOZIN 5 MG/1
5 TABLET, FILM COATED ORAL DAILY
Qty: 90 TABLET | Refills: 3 | Status: SHIPPED | OUTPATIENT
Start: 2022-03-08 | End: 2022-03-08

## 2022-03-08 RX ORDER — DAPAGLIFLOZIN 5 MG/1
5 TABLET, FILM COATED ORAL DAILY
Qty: 90 TABLET | Refills: 3 | Status: SHIPPED | OUTPATIENT
Start: 2022-03-08 | End: 2023-03-14

## 2022-03-08 NOTE — PROGRESS NOTES
SUBJECTIVE:   CC: Opal Benitez is an 66 year old male who presents for preventative health visit.       Patient has been advised of split billing requirements and indicates understanding: Yes  Healthy Habits:     Getting at least 3 servings of Calcium per day:  Yes    Bi-annual eye exam:  Yes    Dental care twice a year:  Yes    Sleep apnea or symptoms of sleep apnea:  None    Diet:  Regular (no restrictions) and Low salt    Frequency of exercise:  6-7 days/week    Duration of exercise:  30-45 minutes    Taking medications regularly:  Yes    Medication side effects:  Not applicable    PHQ-2 Total Score: 0    Additional concerns today:  No              Today's PHQ-2 Score:   PHQ-2 ( 1999 Pfizer) 3/8/2022   Q1: Little interest or pleasure in doing things 0   Q2: Feeling down, depressed or hopeless 0   PHQ-2 Score 0   Q1: Little interest or pleasure in doing things Not at all   Q2: Feeling down, depressed or hopeless Not at all   PHQ-2 Score 0       Abuse: Current or Past(Physical, Sexual or Emotional)- No  Do you feel safe in your environment? Yes    Have you ever done Advance Care Planning? (For example, a Health Directive, POLST, or a discussion with a medical provider or your loved ones about your wishes): No, advance care planning information given to patient to review.  Patient plans to discuss their wishes with loved ones or provider.      Social History     Tobacco Use     Smoking status: Never Smoker     Smokeless tobacco: Not on file     Tobacco comment: states he never has smokes   Substance Use Topics     Alcohol use: Not on file     Comment: occasional beer         Alcohol Use 3/8/2022   Prescreen: >3 drinks/day or >7 drinks/week? No       Last PSA: No results found for: PSA    Reviewed orders with patient. Reviewed health maintenance and updated orders accordingly - Cologuard ordered after a discussion of options      Reviewed and updated as needed this visit by clinical staff    Allergies  Meds           "    Reviewed and updated as needed this visit by Provider                 Patient is generally quite happy with his health.  He still enjoys working at the [employer] plans to work till he is 72.  He is taking appropriate COVID-19 measures.  He feels that his diabetes has improved.  And generally feels entirely well and has no complaints.  We discussed colon cancer screening and he opted for Cologuard again.  Had a recent PSA.    Review of Systems  CONSTITUTIONAL: NEGATIVE for fever, chills, change in weight  INTEGUMENTARY/SKIN: NEGATIVE for worrisome rashes, moles or lesions  EYES: NEGATIVE for vision changes or irritation  ENT: NEGATIVE for ear, mouth and throat problems  RESP: NEGATIVE for significant cough or SOB  CV: NEGATIVE for chest pain, palpitations or peripheral edema  GI: NEGATIVE for nausea, abdominal pain, heartburn, or change in bowel habits   male: negative for dysuria, hematuria, decreased urinary stream, erectile dysfunction, urethral discharge  MUSCULOSKELETAL: NEGATIVE for significant arthralgias or myalgia  NEURO: NEGATIVE for weakness, dizziness or paresthesias  PSYCHIATRIC: NEGATIVE for changes in mood or affect    OBJECTIVE:   /62 (BP Location: Right arm)   Pulse 85   Temp 97.1  F (36.2  C)   Ht 1.702 m (5' 7\")   Wt 81.1 kg (178 lb 12.8 oz)   SpO2 95%   BMI 28.00 kg/m      Physical Exam  Patient is a healthy-appearing man in no distress.  Eyes appear normal.  HEENT exam is unremarkable.  Neck is supple no adenopathy or thyromegaly.  Chest clear.  Cardiac exam regular no murmur, no edema.  Carotid and posterior tibial pulses normal.  Abdomen nontender.  Feet appear normal with intact pulses and sensation to monofilament and vibration.  Neurologic exam unremarkable.  Mood and affect normal.    Diagnostic Test Results:  Labs reviewed in Epic  From December 30, 2021 urine micro albumin/creatinine was normal at 4, glucose 129 BMP otherwise normal, hemoglobin A1c 7.7, PSA " 1.16    ASSESSMENT/PLAN:       ICD-10-CM    1. Wellness examination  Z00.00    2. Type 2 diabetes mellitus without complication, without long-term current use of insulin (H)  E11.9 metFORMIN (GLUCOPHAGE-XR) 500 MG 24 hr tablet     dapagliflozin (FARXIGA) 5 MG TABS tablet     **A1C FUTURE 3mo   3. Dyslipidemia  E78.5 pravastatin (PRAVACHOL) 40 MG tablet     Lipid panel reflex to direct LDL Fasting   4. Primary hypertension  I10 lisinopril-hydrochlorothiazide (ZESTORETIC) 20-12.5 MG tablet     Basic metabolic panel  (Ca, Cl, CO2, Creat, Gluc, K, Na, BUN)   5. Microscopic hematuria  R31.29    6. Colon cancer screening  Z12.11 COLOGUARD(EXACT SCIENCES)   7. Encounter for hepatitis C screening test for low risk patient  Z11.59 Hepatitis C antibody     Diabetes inadequately controlled.  We will add a second drug to Metformin.  Reviewed diet and exercise measures.  Patient is physically active.  Hypertension controlled.  On statin.  Discussed colon cancer screening options and he prefers Cologuard again.  Hepatitis C screening after discussion.      COUNSELING:   Reviewed preventive health counseling, as reflected in patient instructions      He reports that he has never smoked. He does not have any smokeless tobacco history on file.      Counseling Resources:  ATP IV Guidelines  Pooled Cohorts Equation Calculator  FRAX Risk Assessment  ICSI Preventive Guidelines  Dietary Guidelines for Americans, 2010  [email or web address]'s MyPlate  ASA Prophylaxis  Lung CA Screening    Aiden Drummond MD  [facility] [place]

## 2022-03-09 LAB — HCV AB SERPL QL IA: NONREACTIVE

## 2022-03-14 ENCOUNTER — TELEPHONE (OUTPATIENT)
Dept: FAMILY MEDICINE | Facility: CLINIC | Age: 67
End: 2022-03-14
Payer: COMMERCIAL

## 2022-03-14 DIAGNOSIS — I10 PRIMARY HYPERTENSION: ICD-10-CM

## 2022-03-14 RX ORDER — LISINOPRIL AND HYDROCHLOROTHIAZIDE 12.5; 2 MG/1; MG/1
2 TABLET ORAL DAILY
Qty: 180 TABLET | Refills: 3 | Status: SHIPPED | OUTPATIENT
Start: 2022-03-14 | End: 2023-03-14

## 2022-03-14 NOTE — TELEPHONE ENCOUNTER
Reason for Call:  Other prescription    Detailed comments: Please call has some concerns about his medication BP he was taking 2 pill a day and when he picked up his new rx now he is taking 1 tablets a day.  Patient is confused about new prescriptions     Phone Number Patient can be reached at: Other phone number:  [phone]     Best Time: any    Can we leave a detailed message on this number? NO    Call taken on 3/14/2022 at 4:56 PM by Swati Breaux

## 2022-04-28 NOTE — TELEPHONE ENCOUNTER
Patient wife calling, states someone from [facility] called but did not leave a message,  wanting to know the Hep C results, advised non reactive.

## 2022-06-13 ENCOUNTER — LAB (OUTPATIENT)
Dept: LAB | Facility: CLINIC | Age: 67
End: 2022-06-13
Payer: COMMERCIAL

## 2022-06-13 DIAGNOSIS — E11.9 TYPE 2 DIABETES MELLITUS WITHOUT COMPLICATION, WITHOUT LONG-TERM CURRENT USE OF INSULIN (H): ICD-10-CM

## 2022-06-13 DIAGNOSIS — I10 PRIMARY HYPERTENSION: ICD-10-CM

## 2022-06-13 DIAGNOSIS — E78.5 DYSLIPIDEMIA: ICD-10-CM

## 2022-06-13 LAB
ANION GAP SERPL CALCULATED.3IONS-SCNC: 8 MMOL/L (ref 3–14)
BUN SERPL-MCNC: 25 MG/DL (ref 7–30)
CALCIUM SERPL-MCNC: 9.2 MG/DL (ref 8.5–10.1)
CHLORIDE BLD-SCNC: 107 MMOL/L (ref 94–109)
CHOLEST SERPL-MCNC: 122 MG/DL
CO2 SERPL-SCNC: 23 MMOL/L (ref 20–32)
CREAT SERPL-MCNC: 0.66 MG/DL (ref 0.66–1.25)
FASTING STATUS PATIENT QL REPORTED: YES
GFR SERPL CREATININE-BSD FRML MDRD: >90 ML/MIN/1.73M2
GLUCOSE BLD-MCNC: 149 MG/DL (ref 70–99)
HBA1C MFR BLD: 7.8 % (ref 0–5.6)
HDLC SERPL-MCNC: 47 MG/DL
LDLC SERPL CALC-MCNC: 37 MG/DL
NONHDLC SERPL-MCNC: 75 MG/DL
POTASSIUM BLD-SCNC: 4.4 MMOL/L (ref 3.4–5.3)
SODIUM SERPL-SCNC: 138 MMOL/L (ref 133–144)
TRIGL SERPL-MCNC: 189 MG/DL

## 2022-06-13 PROCEDURE — 83036 HEMOGLOBIN GLYCOSYLATED A1C: CPT

## 2022-06-13 PROCEDURE — 80061 LIPID PANEL: CPT

## 2022-06-13 PROCEDURE — 36415 COLL VENOUS BLD VENIPUNCTURE: CPT

## 2022-06-13 PROCEDURE — 80048 BASIC METABOLIC PNL TOTAL CA: CPT

## 2022-08-01 ENCOUNTER — ALLIED HEALTH/NURSE VISIT (OUTPATIENT)
Dept: FAMILY MEDICINE | Facility: CLINIC | Age: 67
End: 2022-08-01
Payer: COMMERCIAL

## 2022-08-01 DIAGNOSIS — Z23 NEED FOR VACCINATION: Primary | ICD-10-CM

## 2022-08-01 PROCEDURE — 91306 COVID-19,PF,MODERNA (18+ YRS BOOSTER .25ML): CPT

## 2022-08-01 PROCEDURE — 99207 PR NO CHARGE NURSE ONLY: CPT

## 2022-08-01 PROCEDURE — 0064A COVID-19,PF,MODERNA (18+ YRS BOOSTER .25ML): CPT

## 2022-09-12 ENCOUNTER — NURSE TRIAGE (OUTPATIENT)
Dept: NURSING | Facility: CLINIC | Age: 67
End: 2022-09-12

## 2022-09-12 NOTE — TELEPHONE ENCOUNTER
Nurse Triage SBAR    Is this a 2nd Level Triage? YES, LICENSED PRACTITIONER REVIEW IS REQUIRED    2nd level triage required per protocol. FNA has already given advice below    Situation: COVID    Background:   Recent travel from [place], [place] on 9/7  Symptoms started 9/10 and tested positive using a home test yesterday 9/11    Pt takes medications for hypertension and DM    Assessment:   Pt reports mild symptoms:  Scratchy throat  Mild wet cough  Sinus congestion and runny nose  Fatigue    No fever  No SOB  No chest pain    Protocol Recommended Disposition:   Discuss With PCP And Callback By Nurse Within 1 Hour    Recommendation:   FNA discussed COVID treatment - Paxlovid. Side effects discussed. Pt declined COVID treatment after learning side effects and possibility of rebound COVID symptoms. Pt states his symptoms are mild and would prefer not to take Paxlovid.    Discussed home care - symptomatic treatment. Blowing nose, sinus rinses, rest. Fluids, food intake. Call back if symptoms get worse    COVID isolation guidelines discussed. Pt was referred to CDC website if he needs to revisit information    FNA advised to cancel PCR as home tests are reliable for positive results. Pt agreed.    Pt verbalized understanding of above information.    Routed to provider   Does provider have any further recommendations? Please call pt [phone]. No call back necessary if PCP does not have further orders.    Does the patient meet one of the following criteria for ADS visit consideration? 16+ years old, with an [facility] PCP     TIP  Providers, please consider if this condition is appropriate for management at one of our Acute and Diagnostic Services sites.     If patient is a good candidate, please use dotphrase <dot>triageresponse and select Refer to ADS to document.      Sonia Anthony RN/[place] Nurse Advisor          Reason for Disposition    [1] HIGH RISK for severe COVID complications (e.g.,  weak immune system, age > 64 years, obesity with BMI of 30 or higher, pregnant, chronic lung disease or other chronic medical condition) AND [2] COVID symptoms (e.g., cough, fever)  (Exceptions: Already seen by PCP and no new or worsening symptoms.)    Additional Information    Negative: SEVERE difficulty breathing (e.g., struggling for each breath, speaks in single words)    Negative: Difficult to awaken or acting confused (e.g., disoriented, slurred speech)    Negative: Bluish (or gray) lips or face now    Negative: Shock suspected (e.g., cold/pale/clammy skin, too weak to stand, low BP, rapid pulse)    Negative: Sounds like a life-threatening emergency to the triager    Negative: [1] Diagnosed or suspected COVID-19 AND [2] symptoms lasting 3 or more weeks    Negative: [1] COVID-19 exposure AND [2] no symptoms    Negative: COVID-19 vaccine reaction suspected (e.g., fever, headache, muscle aches) occurring 1 to 3 days after getting vaccine    Negative: COVID-19 vaccine, questions about    Negative: [1] Lives with someone known to have influenza (flu test positive) AND [2] flu-like symptoms (e.g., cough, runny nose, sore throat, SOB; with or without fever)    Negative: [1] Adult with possible COVID-19 symptoms AND [2] triager concerned about severity of symptoms or other causes    Negative: COVID-19 and breastfeeding, questions about    Negative: SEVERE or constant chest pain or pressure  (Exception: Mild central chest pain, present only when coughing.)    Negative: MODERATE difficulty breathing (e.g., speaks in phrases, SOB even at rest, pulse 100-120)    Negative: Headache and stiff neck (can't touch chin to chest)    Negative: Oxygen level (e.g., pulse oximetry) 90 percent or lower    Negative: Chest pain or pressure  (Exception: MILD central chest pain, present only when coughing)    Negative: Patient sounds very sick or weak to the triager    Negative: MILD difficulty breathing (e.g., minimal/no SOB at rest,  SOB with walking, pulse <100)    Negative: Fever > 103 F (39.4 C)    Negative: [1] Fever > 101 F (38.3 C) AND [2] over 60 years of age    Negative: [1] Fever > 100.0 F (37.8 C) AND [2] bedridden (e.g., nursing home patient, CVA, chronic illness, recovering from surgery)    Protocols used: CORONAVIRUS (COVID-19) DIAGNOSED OR [id number]

## 2022-09-12 NOTE — TELEPHONE ENCOUNTER
TC with pt, he has decided to not pursue antiviral medication. He will quarantine, rest, hydrate and will seek medical attention if develops breathing difficulty or sxs worsen.

## 2022-10-26 ENCOUNTER — IMMUNIZATION (OUTPATIENT)
Dept: FAMILY MEDICINE | Facility: CLINIC | Age: 67
End: 2022-10-26
Payer: COMMERCIAL

## 2022-10-26 PROCEDURE — 90662 IIV NO PRSV INCREASED AG IM: CPT

## 2022-10-26 PROCEDURE — 90471 IMMUNIZATION ADMIN: CPT

## 2023-03-10 DIAGNOSIS — E11.9 TYPE 2 DIABETES MELLITUS WITHOUT COMPLICATION, WITHOUT LONG-TERM CURRENT USE OF INSULIN (H): Primary | ICD-10-CM

## 2023-03-31 ENCOUNTER — OFFICE VISIT (OUTPATIENT)
Dept: FAMILY MEDICINE | Facility: CLINIC | Age: 68
End: 2023-03-31
Payer: COMMERCIAL

## 2023-03-31 VITALS
TEMPERATURE: 97.7 F | HEIGHT: 68 IN | WEIGHT: 172.3 LBS | HEART RATE: 89 BPM | BODY MASS INDEX: 26.11 KG/M2 | DIASTOLIC BLOOD PRESSURE: 71 MMHG | SYSTOLIC BLOOD PRESSURE: 104 MMHG | OXYGEN SATURATION: 98 %

## 2023-03-31 DIAGNOSIS — Z00.00 ENCOUNTER FOR MEDICARE ANNUAL WELLNESS EXAM: Primary | ICD-10-CM

## 2023-03-31 DIAGNOSIS — M75.41 IMPINGEMENT SYNDROME OF SHOULDER REGION, RIGHT: ICD-10-CM

## 2023-03-31 DIAGNOSIS — Z11.4 SCREENING FOR HIV (HUMAN IMMUNODEFICIENCY VIRUS): ICD-10-CM

## 2023-03-31 DIAGNOSIS — E11.9 TYPE 2 DIABETES MELLITUS WITHOUT COMPLICATION, WITHOUT LONG-TERM CURRENT USE OF INSULIN (H): ICD-10-CM

## 2023-03-31 DIAGNOSIS — Z23 NEED FOR SHINGLES VACCINE: ICD-10-CM

## 2023-03-31 DIAGNOSIS — E78.5 DYSLIPIDEMIA: ICD-10-CM

## 2023-03-31 DIAGNOSIS — Z12.5 SCREENING FOR PROSTATE CANCER: ICD-10-CM

## 2023-03-31 DIAGNOSIS — Z23 NEED FOR DIPHTHERIA-TETANUS-PERTUSSIS (TDAP) VACCINE: ICD-10-CM

## 2023-03-31 DIAGNOSIS — R31.29 MICROSCOPIC HEMATURIA: ICD-10-CM

## 2023-03-31 DIAGNOSIS — I10 PRIMARY HYPERTENSION: ICD-10-CM

## 2023-03-31 LAB
ALBUMIN UR-MCNC: NEGATIVE MG/DL
ANION GAP SERPL CALCULATED.3IONS-SCNC: 11 MMOL/L (ref 7–15)
APPEARANCE UR: CLEAR
BACTERIA #/AREA URNS HPF: ABNORMAL /HPF
BILIRUB UR QL STRIP: NEGATIVE
BUN SERPL-MCNC: 19.8 MG/DL (ref 8–23)
CALCIUM SERPL-MCNC: 9.3 MG/DL (ref 8.8–10.2)
CHLORIDE SERPL-SCNC: 103 MMOL/L (ref 98–107)
CHOLEST SERPL-MCNC: 108 MG/DL
COLOR UR AUTO: YELLOW
CREAT SERPL-MCNC: 0.75 MG/DL (ref 0.67–1.17)
CREAT UR-MCNC: 109 MG/DL
DEPRECATED HCO3 PLAS-SCNC: 25 MMOL/L (ref 22–29)
GFR SERPL CREATININE-BSD FRML MDRD: >90 ML/MIN/1.73M2
GLUCOSE SERPL-MCNC: 151 MG/DL (ref 70–99)
GLUCOSE UR STRIP-MCNC: >=1000 MG/DL
HBA1C MFR BLD: 7.7 % (ref 0–5.6)
HDLC SERPL-MCNC: 45 MG/DL
HGB UR QL STRIP: ABNORMAL
HIV 1+2 AB+HIV1 P24 AG SERPL QL IA: NONREACTIVE
KETONES UR STRIP-MCNC: NEGATIVE MG/DL
LDLC SERPL CALC-MCNC: 40 MG/DL
LEUKOCYTE ESTERASE UR QL STRIP: NEGATIVE
MICROALBUMIN UR-MCNC: <12 MG/L
MICROALBUMIN/CREAT UR: NORMAL MG/G{CREAT}
NITRATE UR QL: NEGATIVE
NONHDLC SERPL-MCNC: 63 MG/DL
PH UR STRIP: 5 [PH] (ref 5–7)
POTASSIUM SERPL-SCNC: 5 MMOL/L (ref 3.4–5.3)
PSA SERPL DL<=0.01 NG/ML-MCNC: 1.18 NG/ML (ref 0–4.5)
RBC #/AREA URNS AUTO: ABNORMAL /HPF
SODIUM SERPL-SCNC: 139 MMOL/L (ref 136–145)
SP GR UR STRIP: 1.02 (ref 1–1.03)
SQUAMOUS #/AREA URNS AUTO: ABNORMAL /LPF
TRIGL SERPL-MCNC: 114 MG/DL
UROBILINOGEN UR STRIP-ACNC: 0.2 E.U./DL
WBC #/AREA URNS AUTO: ABNORMAL /HPF

## 2023-03-31 PROCEDURE — 90715 TDAP VACCINE 7 YRS/> IM: CPT | Performed by: INTERNAL MEDICINE

## 2023-03-31 PROCEDURE — 80061 LIPID PANEL: CPT | Performed by: INTERNAL MEDICINE

## 2023-03-31 PROCEDURE — 99214 OFFICE O/P EST MOD 30 MIN: CPT | Mod: 25 | Performed by: INTERNAL MEDICINE

## 2023-03-31 PROCEDURE — 90471 IMMUNIZATION ADMIN: CPT | Performed by: INTERNAL MEDICINE

## 2023-03-31 PROCEDURE — 99207 PR FOOT EXAM NO CHARGE: CPT | Mod: 25 | Performed by: INTERNAL MEDICINE

## 2023-03-31 PROCEDURE — 90472 IMMUNIZATION ADMIN EACH ADD: CPT | Performed by: INTERNAL MEDICINE

## 2023-03-31 PROCEDURE — 80048 BASIC METABOLIC PNL TOTAL CA: CPT | Performed by: INTERNAL MEDICINE

## 2023-03-31 PROCEDURE — 81001 URINALYSIS AUTO W/SCOPE: CPT | Performed by: INTERNAL MEDICINE

## 2023-03-31 PROCEDURE — 99397 PER PM REEVAL EST PAT 65+ YR: CPT | Mod: 25 | Performed by: INTERNAL MEDICINE

## 2023-03-31 PROCEDURE — G0103 PSA SCREENING: HCPCS | Performed by: INTERNAL MEDICINE

## 2023-03-31 PROCEDURE — 36415 COLL VENOUS BLD VENIPUNCTURE: CPT | Performed by: INTERNAL MEDICINE

## 2023-03-31 PROCEDURE — 83036 HEMOGLOBIN GLYCOSYLATED A1C: CPT | Performed by: INTERNAL MEDICINE

## 2023-03-31 PROCEDURE — 82043 UR ALBUMIN QUANTITATIVE: CPT | Performed by: INTERNAL MEDICINE

## 2023-03-31 PROCEDURE — 90677 PCV20 VACCINE IM: CPT | Performed by: INTERNAL MEDICINE

## 2023-03-31 PROCEDURE — 82570 ASSAY OF URINE CREATININE: CPT | Performed by: INTERNAL MEDICINE

## 2023-03-31 PROCEDURE — 87389 HIV-1 AG W/HIV-1&-2 AB AG IA: CPT | Performed by: INTERNAL MEDICINE

## 2023-03-31 RX ORDER — LISINOPRIL AND HYDROCHLOROTHIAZIDE 12.5; 2 MG/1; MG/1
1 TABLET ORAL DAILY
Qty: 90 TABLET | Refills: 0
Start: 2023-03-31 | End: 2023-04-14

## 2023-03-31 RX ORDER — METFORMIN HCL 500 MG
2000 TABLET, EXTENDED RELEASE 24 HR ORAL DAILY
Qty: 360 TABLET | Refills: 3 | Status: SHIPPED | OUTPATIENT
Start: 2023-03-31 | End: 2024-06-17

## 2023-03-31 RX ORDER — LISINOPRIL AND HYDROCHLOROTHIAZIDE 12.5; 2 MG/1; MG/1
2 TABLET ORAL DAILY
Qty: 180 TABLET | Refills: 3 | Status: SHIPPED | OUTPATIENT
Start: 2023-03-31 | End: 2023-03-31

## 2023-03-31 RX ORDER — DAPAGLIFLOZIN 10 MG/1
10 TABLET, FILM COATED ORAL DAILY
Qty: 90 TABLET | Refills: 3 | Status: SHIPPED | OUTPATIENT
Start: 2023-03-31 | End: 2023-10-09

## 2023-03-31 ASSESSMENT — ENCOUNTER SYMPTOMS
CONSTIPATION: 0
MYALGIAS: 0
DYSURIA: 0
PALPITATIONS: 0
HEARTBURN: 0
EYE PAIN: 0
FEVER: 0
HEADACHES: 0
JOINT SWELLING: 1
NERVOUS/ANXIOUS: 0
WEAKNESS: 0
ABDOMINAL PAIN: 0
HEMATOCHEZIA: 0
SORE THROAT: 0
ARTHRALGIAS: 1
COUGH: 0
DIZZINESS: 0
NAUSEA: 0
PARESTHESIAS: 0
SHORTNESS OF BREATH: 0
HEMATURIA: 0
CHILLS: 0
FREQUENCY: 0
DIARRHEA: 0

## 2023-03-31 ASSESSMENT — ACTIVITIES OF DAILY LIVING (ADL): CURRENT_FUNCTION: NO ASSISTANCE NEEDED

## 2023-03-31 NOTE — PATIENT INSTRUCTIONS
Patient Education   Personalized Prevention Plan  You are due for the preventive services outlined below.  Your care team is available to assist you in scheduling these services.  If you have already completed any of these items, please share that information with your care team to update in your medical record.  Health Maintenance Due   Topic Date Due     Diabetic Foot Exam  Never done     ANNUAL REVIEW OF HM ORDERS  Never done     Eye Exam  Never done     HIV Screening  Never done     Zoster (Shingles) Vaccine (1 of 2) Never done     Pneumococcal Vaccine (2 - PCV) 10/24/2009     Diptheria Tetanus Pertussis (DTAP/TDAP/TD) Vaccine (3 - Td or Tdap) 07/07/2016     A1C Lab  09/13/2022     AORTIC ANEURYSM SCREENING (SYSTEM ASSIGNED)  Never done     Kidney Microalbumin Urine Test  12/29/2022       Signs of Hearing Loss  Hearing loss is a problem shared by many people. In fact, it's one of the most common health problems, particularly as people age. Most people aged 65 and older have some hearing loss. By age 80, almost everyone does. Hearing loss often occurs slowly over the years. So, you may not realize your hearing has gotten worse.   When sudden hearing loss occurs, it's important to contact your healthcare provider right away. Your provider will do a medical exam and a hearing exam as soon as possible. This is to help find the cause and type of your sudden hearing loss. Based on your diagnosis, your healthcare provider will discuss possible treatments.      Hearing much better with one ear can be a sign of hearing loss.     Have your hearing checked  Call your healthcare provider if you:     Have to strain to hear normal conversation    Have to watch other people s faces very carefully to follow what they re saying    Need to ask people to repeat what they ve said    Often misunderstand what people are saying    Turn the volume of the television or radio up so high that others complain    Feel that people are  mumbling when they re talking to you    Find that the effort to hear leaves you feeling tired and irritated    Notice, when using the phone, that you hear better with one ear than the other  Isaiah last reviewed this educational content on 6/1/2022 2000-2022 [employer]. All rights reserved. This information is not intended as a substitute for professional medical care. Always follow your healthcare professional's instructions.

## 2023-03-31 NOTE — PROGRESS NOTES
Prior to immunization administration, verified patients identity using patient s name and date of birth. Please see Immunization Activity for additional information.     Screening Questionnaire for Adult Immunization    Are you sick today?   No   Do you have allergies to medications, food, a vaccine component or latex?   No   Have you ever had a serious reaction after receiving a vaccination?   No   Do you have a long-term health problem with heart, lung, kidney, or metabolic disease (e.g., diabetes), asthma, a blood disorder, no spleen, complement component deficiency, a cochlear implant, or a spinal fluid leak?  Are you on long-term aspirin therapy?   No   Do you have cancer, leukemia, HIV/AIDS, or any other immune system problem?   No   Do you have a parent, brother, or sister with an immune system problem?   No   In the past 3 months, have you taken medications that affect  your immune system, such as prednisone, other steroids, or anticancer drugs; drugs for the treatment of rheumatoid arthritis, Crohn s disease, or psoriasis; or have you had radiation treatments?   No   Have you had a seizure, or a brain or other nervous system problem?   No   During the past year, have you received a transfusion of blood or blood    products, or been given immune (gamma) globulin or antiviral drug?   No   For women: Are you pregnant or is there a chance you could become       pregnant during the next month?   No   Have you received any vaccinations in the past 4 weeks?   No     Immunization questionnaire answers were all negative.      Injection of Tdap and PCV20 given by Erika Hwang MA. Patient instructed to remain in clinic for 15 minutes afterwards, and to report any adverse reactions.     Screening performed by Erika Hwang MA on 3/31/2023 at 8:03 AM.

## 2023-03-31 NOTE — ASSESSMENT & PLAN NOTE
Lightheadedness with relative hypotension, will decrease lisinopril 20/hydrochlorothiazide 12.5 to 1 tablet daily  Blood pressure check in a few weeks

## 2023-03-31 NOTE — LETTER
April 4, 2023      Opal Emmanuel  [address] [facility] [address]        Dear ,    We are writing to inform you of your test results.    Diabetes not well controlled. Hopefully the medication increase at your last visit will take care of this. Chronic small amount of blood in urine. Repeat Hgb A1c blood test in 3 months and schedule a visit after.     Resulted Orders   Lipid panel reflex to direct LDL Fasting   Result Value Ref Range    Cholesterol 108 <200 mg/dL    Triglycerides 114 <150 mg/dL    Direct Measure HDL 45 >=40 mg/dL    LDL Cholesterol Calculated 40 <=100 mg/dL    Non HDL Cholesterol 63 <130 mg/dL    Narrative    Cholesterol  Desirable:  <200 mg/dL    Triglycerides  Normal:  Less than 150 mg/dL  Borderline High:  150-199 mg/dL  High:  200-499 mg/dL  Very High:  Greater than or equal to 500 mg/dL    Direct Measure HDL  Female:  Greater than or equal to 50 mg/dL   Male:  Greater than or equal to 40 mg/dL    LDL Cholesterol  Desirable:  <100mg/dL  Above Desirable:  100-129 mg/dL   Borderline High:  130-159 mg/dL   High:  160-189 mg/dL   Very High:  >= 190 mg/dL    Non HDL Cholesterol  Desirable:  130 mg/dL  Above Desirable:  130-159 mg/dL  Borderline High:  160-189 mg/dL  High:  190-219 mg/dL  Very High:  Greater than or equal to 220 mg/dL   Hemoglobin A1c   Result Value Ref Range    Hemoglobin A1C 7.7 (H) 0.0 - 5.6 %      Comment:      Normal <5.7%   Prediabetes 5.7-6.4%    Diabetes 6.5% or higher     Note: Adopted from ADA consensus guidelines.   Basic metabolic panel  (Ca, Cl, CO2, Creat, Gluc, K, Na, BUN)   Result Value Ref Range    Sodium 139 136 - 145 mmol/L    Potassium 5.0 3.4 - 5.3 mmol/L    Chloride 103 98 - 107 mmol/L    Carbon Dioxide (CO2) 25 22 - 29 mmol/L    Anion Gap 11 7 - 15 mmol/L    Urea Nitrogen 19.8 8.0 - 23.0 mg/dL    Creatinine 0.75 0.67 - 1.17 mg/dL    Calcium 9.3 8.8 - 10.2 mg/dL    Glucose 151 (H) 70 - 99 mg/dL    GFR Estimate >90 >60 mL/min/1.73m2      Comment:       eGFR calculated using 2021 CKD-EPI equation.   Albumin Random Urine Quantitative with Creat Ratio   Result Value Ref Range    Creatinine Urine mg/dL 109.0 mg/dL      Comment:      The reference ranges have not been established in urine creatinine. The results should be integrated into the clinical context for interpretation.    Albumin Urine mg/L <12.0 mg/L      Comment:      The reference ranges have not been established in urine albumin. The results should be integrated into the clinical context for interpretation.    Albumin Urine mg/g Cr        Comment:      Unable to calculate, urine albumin and/or urine creatinine is outside detectable limits.  Microalbuminuria is defined as an albumin:creatinine ratio of 17 to 299 for males and 25 to 299 for females. A ratio of albumin:creatinine of 300 or higher is indicative of overt proteinuria.  Due to biologic variability, positive results should be confirmed by a second, first-morning random or 24-hour timed urine specimen. If there is discrepancy, a third specimen is recommended. When 2 out of 3 results are in the microalbuminuria range, this is evidence for incipient nephropathy and warrants increased efforts at glucose control, blood pressure control, and institution of therapy with an angiotensin-converting-enzyme (ACE) inhibitor (if the patient can tolerate it).     HIV Antigen Antibody Combo   Result Value Ref Range    HIV Antigen Antibody Combo Nonreactive Nonreactive      Comment:      HIV-1 p24 Ag & HIV-1/HIV-2 Ab Not Detected   UA reflex to Microscopic - lab collect   Result Value Ref Range    Color Urine Yellow Colorless, Straw, Light Yellow, Yellow    Appearance Urine Clear Clear    Glucose Urine >=1000 (A) Negative mg/dL    Bilirubin Urine Negative Negative    Ketones Urine Negative Negative mg/dL    Specific Gravity Urine 1.020 1.003 - 1.035    Blood Urine Trace (A) Negative    pH Urine 5.0 5.0 - 7.0    Protein Albumin Urine Negative Negative mg/dL     Urobilinogen Urine 0.2 0.2, 1.0 E.U./dL    Nitrite Urine Negative Negative    Leukocyte Esterase Urine Negative Negative   PSA, screen   Result Value Ref Range    Prostate Specific Antigen Screen 1.18 0.00 - 4.50 ng/mL    Narrative    This result is obtained using the Roche Elecsys total PSA method on the marley [id number] immunoassay analyzer. Results obtained with different assay methods or kits cannot be used interchangeably.   Urine Microscopic Exam   Result Value Ref Range    Bacteria Urine None Seen None Seen /HPF    RBC Urine None Seen 0-2 /HPF /HPF    WBC Urine 0-5 0-5 /HPF /HPF    Squamous Epithelials Urine Few (A) None Seen /LPF       If you have any questions or concerns, please call the clinic at the number listed above.       Sincerely,      Aiden Drummond MD

## 2023-03-31 NOTE — PROGRESS NOTES
"Primary  SUBJECTIVE:   CC: Opal is an 67 year old who presents for preventative health visit.   Additional Questions 3/31/2023   Roomed by Erika WHITNEY CMA   Accompanied by Self     Patient has been advised of split billing requirements and indicates understanding: Yes  Healthy Habits:     In general, how would you rate your overall health?  Good    Frequency of exercise:  6-7 days/week    Duration of exercise:  45-60 minutes    Do you usually eat at least 4 servings of fruit and vegetables a day, include whole grains    & fiber and avoid regularly eating high fat or \"junk\" foods?  Yes    Taking medications regularly:  Yes    Medication side effects:  None    Ability to successfully perform activities of daily living:  No assistance needed    Home Safety:  No safety concerns identified    Hearing Impairment:  Difficulty following a conversation in a noisy restaurant or crowded room, feel that people are mumbling or not speaking clearly and difficulty following dialogue in the theater    In the past 6 months, have you been bothered by leaking of urine?  No    In general, how would you rate your overall mental or emotional health?  Good      PHQ-2 Total Score: 0    Additional concerns today:  No    Today's PHQ-2 Score:   PHQ-2 ( 1999 [employer]) 3/31/2023   Q1: Little interest or pleasure in doing things 0   Q2: Feeling down, depressed or hopeless 0   PHQ-2 Score 0   Q1: Little interest or pleasure in doing things Not at all   Q2: Feeling down, depressed or hopeless Not at all   PHQ-2 Score 0       Social History     Tobacco Use     Smoking status: Never     Smokeless tobacco: Never     Tobacco comments:     states he never has smokes   Substance Use Topics     Alcohol use: Yes     Alcohol/week: 6.0 standard drinks     Types: 6 Cans of beer per week     Comment: occasional beer       Alcohol Use 3/31/2023   Prescreen: >3 drinks/day or >7 drinks/week? No       Last PSA:   Prostate Specific Antigen Screen   Date Value Ref " Range Status   12/29/2021 1.16 < OR = 4.00 ng/mL Final     Comment:     The total PSA value from this assay system is   standardized against the WHO standard. The test   result will be approximately 20% lower when compared   to the equimolar-standardized total PSA (Mateo   Cameron). Comparison of serial PSA results should be   interpreted with this fact in mind.     This test was performed using the Siemens   chemiluminescent method. Values obtained from   different assay methods cannot be used  interchangeably. PSA levels, regardless of  value, should not be interpreted as absolute  evidence of the presence or absence of disease.  Lab test performed by:  Lab Mnemonic:[facility]  [facility]  [address] [place] [address]  Leonides Seaman M.D.  QUEST Specimen received date and time: 30-DEC-2021 02:55:00.00         Reviewed orders with patient. Reviewed health maintenance and updated orders accordingly - Yes  Labs reviewed in EPIC    Reviewed and updated as needed this visit by clinical staff   Tobacco  Allergies               Reviewed and updated as needed this visit by Provider                 Past Medical History:   Diagnosis Date     Unspecified essential hypertension     Essential hypertension      Past Surgical History:   Procedure Laterality Date     COLOGUARD(EXACT SCIENCES)  12/10/2018     COLOGUARD(EXACT SCIENCES)  11/18/2015     CYSTOSCOPY  2000       Review of Systems   Constitutional: Negative for chills and fever.   HENT: Positive for hearing loss. Negative for congestion, ear pain and sore throat.    Eyes: Positive for visual disturbance. Negative for pain.   Respiratory: Negative for cough and shortness of breath.    Cardiovascular: Negative for chest pain, palpitations and peripheral edema.   Gastrointestinal: Negative for abdominal pain, constipation, diarrhea, heartburn, hematochezia and nausea.   Genitourinary: Negative for dysuria, frequency, genital sores, hematuria,  "impotence, penile discharge and urgency.   Musculoskeletal: Positive for arthralgias and joint swelling. Negative for myalgias.   Skin: Negative for rash.   Neurological: Negative for dizziness, weakness, headaches and paresthesias.   Psychiatric/Behavioral: Negative for mood changes. The patient is not nervous/anxious.      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  He does not have sweating or other associated symptoms.  Sitting down relieves the symptoms.  He had some left eye problems and saw Dr. Loaiza twice last year and will be seeing him this fall.  Complains of 2 months of right shoulder discomfort particularly with reaching up.  No injury or trauma.  No weakness.  He does have chronic hearing loss and has hearing aids.    OBJECTIVE:   /71 (BP Location: Right arm)   Pulse 89   Temp 97.7  F (36.5  C)   Ht 1.721 m (5' 7.75\")   Wt 78.2 kg (172 lb 4.8 oz)   SpO2 98%   BMI 26.39 kg/m      Physical Exam  Healthy-appearing man in no distress  Eyes normal  HEENT exam unremarkable  Neck supple no nodes or thyromegaly  Lungs clear  Cardiac exam regular no murmur or edema.  Normal carotid pulsations  Abdomen soft and nontender  Alert, oriented, speech fluent, EOMI, no facial asymmetry, normal strength and gait  Normal mood and affect  Feet in good condition with intact dorsalis pedis and posterior tibial pulsations.  Sensation monofilament and vibration intact    Diagnostic Test Results:  Labs reviewed in Epic    ASSESSMENT/PLAN:       ICD-10-CM    1. Encounter for Medicare annual wellness exam  Z00.00 PRIMARY CARE FOLLOW-UP SCHEDULING      2. Screening for HIV (human immunodeficiency virus)  Z11.4 HIV Antigen Antibody Combo      3. Primary hypertension  I10 lisinopril-hydrochlorothiazide (ZESTORETIC) 20-12.5 MG tablet     DISCONTINUED: lisinopril-hydrochlorothiazide (ZESTORETIC) 20-12.5 MG tablet      4. Dyslipidemia  " E78.5       5. Microscopic hematuria  R31.29 UA reflex to Microscopic - lab collect     Cytology non gyn      6. Type 2 diabetes mellitus without complication, without long-term current use of insulin (H)  E11.9 FOOT EXAM     metFORMIN (GLUCOPHAGE XR) 500 MG 24 hr tablet     dapagliflozin (FARXIGA) 10 MG TABS tablet      7. Screening for prostate cancer  Z12.5 PSA, screen      8. Impingement syndrome of shoulder region, right  M75.41 Physical Therapy Referral      9. Need for diphtheria-tetanus-pertussis (Tdap) vaccine  Z23 Tdap, tetanus-diptheria-acell pertussis, (BOOSTRIX) 5-2.5-18.5 LF-MCG/0.5 RIVER injection      10. Need for shingles vaccine  Z23 zoster vaccine recombinant adjuvanted (SHINGRIX) injection      Increase Farxiga from 5 to 10 mg for diabetes  Follow-up ophthalmology this fall  PT referral for impingement of the right shoulder  Hypertension controlled but he has relative hypotension and some lightheadedness.  Decrease lisinopril/hydrochlorothiazide to 1 tablet daily          COUNSELING:   Reviewed preventive health counseling, as reflected in patient instructions  Special attention given to:        Consider AAA screening for ages 65-75 and > 100 cig smoking history or family history of AAA       Regular exercise       Healthy diet/nutrition       Immunizations    Vaccinated for: Pneumococcal             HIV screeninx in teen years, 1x in adult years, and at intervals if high risk       Prostate cancer screening        He reports that he has never smoked. He has never used smokeless tobacco.            Aiden Drummond MD  [facility]    The patient was provided with written information regarding signs of hearing loss.

## 2023-03-31 NOTE — ASSESSMENT & PLAN NOTE
On metformin XR 2000 mg daily and dapagliflozin increased to 10 mg daily   diabetes is improving/stable/worsening: unchanged.  Medication changes per orders.  Diabetes will be reassessed in 6 months.

## 2023-04-07 ENCOUNTER — TELEPHONE (OUTPATIENT)
Dept: FAMILY MEDICINE | Facility: CLINIC | Age: 68
End: 2023-04-07
Payer: COMMERCIAL

## 2023-04-07 DIAGNOSIS — E78.5 DYSLIPIDEMIA: ICD-10-CM

## 2023-04-07 RX ORDER — PRAVASTATIN SODIUM 40 MG
40 TABLET ORAL DAILY
Qty: 90 TABLET | Refills: 3 | Status: CANCELLED | OUTPATIENT
Start: 2023-04-07

## 2023-04-07 NOTE — TELEPHONE ENCOUNTER
Patients Pharmacy received a script for    lisinopril-hydrochlorothiazide (ZESTORETIC) 20-12.5 MG tablet  1 tablet, DAILY 0 ordered       With instructions to take 2 tablets 1x daily. Patients spouse stated that she thought Dr. Drummond changed dosage to 1 20-12.5 MG tab daily.    She wanted to confirm this dosage with Dr. Drummond, and refill the one below.    Patients spouse would like a return call verifying the dosage of the    lisinopril-hydrochlorothiazide (ZESTORETIC) 20-12.5 MG tablet  1 tablet, DAILY 0 ordered           Okay to leave a detailed VM on machine.

## 2023-04-14 ENCOUNTER — TELEPHONE (OUTPATIENT)
Dept: FAMILY MEDICINE | Facility: CLINIC | Age: 68
End: 2023-04-14
Payer: COMMERCIAL

## 2023-04-14 DIAGNOSIS — E78.5 DYSLIPIDEMIA: ICD-10-CM

## 2023-04-14 DIAGNOSIS — I10 PRIMARY HYPERTENSION: ICD-10-CM

## 2023-04-14 RX ORDER — PRAVASTATIN SODIUM 40 MG
40 TABLET ORAL DAILY
Qty: 90 TABLET | Refills: 3 | Status: SHIPPED | OUTPATIENT
Start: 2023-04-14 | End: 2024-05-16

## 2023-04-14 RX ORDER — LISINOPRIL AND HYDROCHLOROTHIAZIDE 12.5; 2 MG/1; MG/1
1 TABLET ORAL DAILY
Qty: 90 TABLET | Refills: 3 | Status: SHIPPED | OUTPATIENT
Start: 2023-04-14 | End: 2024-05-16

## 2023-04-14 NOTE — TELEPHONE ENCOUNTER
Prescription approved per [facility] Refill Protocol.    Lisinopril-hydrochlorothiazide last written 3/31/23  Qty 90   No print out  Pravastatin Last Written Prescription Date: 3/8/23  Last Fill Quantity: 90,  # refills: 3   Last office visit: 3/31/2023

## 2023-04-14 NOTE — TELEPHONE ENCOUNTER
Spoke to wife. She states that the pharmacy did not receive an order for lisinopril and Pravastatin. Please resend meds in if appropriate.

## 2023-04-14 NOTE — TELEPHONE ENCOUNTER
Medication Question or Refill      What medication are you calling about (include dose and sig)?: Lisinopril 10 MG - should he be taking once a day or twice a day.    Preferred Pharmacy:  [facility] - [place] [address]  [facility] [address]  Phone: [phone] Fax: [phone]    Who prescribed the medication?:     Do you need a refill? No    When did you use the medication last? today    Patient offered an appointment? No    Do you have any questions or concerns?  Yes      Okay to leave a detailed message?: Yes please call [phone]

## 2023-07-07 ENCOUNTER — TELEPHONE (OUTPATIENT)
Dept: FAMILY MEDICINE | Facility: CLINIC | Age: 68
End: 2023-07-07
Payer: COMMERCIAL

## 2023-07-13 ENCOUNTER — OFFICE VISIT (OUTPATIENT)
Dept: FAMILY MEDICINE | Facility: CLINIC | Age: 68
End: 2023-07-13
Payer: COMMERCIAL

## 2023-07-13 VITALS
BODY MASS INDEX: 25.91 KG/M2 | OXYGEN SATURATION: 96 % | DIASTOLIC BLOOD PRESSURE: 60 MMHG | RESPIRATION RATE: 16 BRPM | HEIGHT: 68 IN | TEMPERATURE: 97.6 F | SYSTOLIC BLOOD PRESSURE: 120 MMHG | WEIGHT: 171 LBS | HEART RATE: 86 BPM

## 2023-07-13 DIAGNOSIS — H69.93 DYSFUNCTION OF BOTH EUSTACHIAN TUBES: Primary | ICD-10-CM

## 2023-07-13 DIAGNOSIS — H65.03 BILATERAL ACUTE SEROUS OTITIS MEDIA, RECURRENCE NOT SPECIFIED: ICD-10-CM

## 2023-07-13 DIAGNOSIS — E11.9 TYPE 2 DIABETES MELLITUS WITHOUT COMPLICATION, WITHOUT LONG-TERM CURRENT USE OF INSULIN (H): ICD-10-CM

## 2023-07-13 PROCEDURE — 99213 OFFICE O/P EST LOW 20 MIN: CPT | Performed by: FAMILY MEDICINE

## 2023-07-13 RX ORDER — FLUTICASONE PROPIONATE 50 MCG
1 SPRAY, SUSPENSION (ML) NASAL DAILY
Status: CANCELLED | OUTPATIENT
Start: 2023-07-13

## 2023-07-13 NOTE — PROGRESS NOTES
"  Assessment & Plan   Problem List Items Addressed This Visit        Endocrine    Type 2 diabetes mellitus without complication, without long-term current use of insulin (H)   Other Visit Diagnoses     Dysfunction of both eustachian tubes    -  Primary    Bilateral acute serous otitis media, recurrence not specified          Bilateral acute serous otitis media.  Dysfunction of bilateral eustachian tubes.  Type 2 diabetes noted and taken into account for medical decision making.  We will have patient get started on over-the-counter Flonase nasal spray 2 sprays per nostril per day if symptoms are not improved within 2 weeks recommend he follow-up with his PCP.  Shared with him that oral steroids may be more potent and provide relief sooner however they would bring his blood sugars out of control.  He has never used a sliding scale insulin.                 BMI:   Estimated body mass index is 26.19 kg/m  as calculated from the following:    Height as of this encounter: 1.721 m (5' 7.75\").    Weight as of this encounter: 77.6 kg (171 lb).           Sharla Aguirre MD  [facility]    Serg Joseph is a 67 year old, presenting for the following health issues:  Ear Problem (Pt c/o R ear pain after swimming July 7. He felt water in his ear. He tried ear drops but still feels like there is water in his ear. )    Patient developed acute right-sided hearing loss following swimming in a pool in [place] on July 7.  He reached out to his audiologist who recommended that he make an appointment with ear nose and throat.  Unfortunately he cannot get in with ear nose and throat until January.  Audiology then recommended that he come see his primary care doctor to see if there is any wax in the ear.  He said no fevers or chills.  He has tried some eardrops without any relief.  He feels like there is water trapped there.          7/13/2023    10:12 AM   Additional Questions   Roomed by Ivone" "    History of Present Illness       Reason for visit:  Ear issue    He eats 2-3 servings of fruits and vegetables daily.He consumes 1 sweetened beverage(s) daily.He exercises with enough effort to increase his heart rate 20 to 29 minutes per day.  He exercises with enough effort to increase his heart rate 6 days per week.   He is taking medications regularly.               Review of Systems         Objective    /60 (BP Location: Right arm, Patient Position: Sitting)   Pulse 86   Temp 97.6  F (36.4  C)   Resp 16   Ht 1.721 m (5' 7.75\")   Wt 77.6 kg (171 lb)   SpO2 96%   BMI 26.19 kg/m    Body mass index is 26.19 kg/m .  Physical Exam       General: alert and oriented ×3 no acute distress.    HEENT: pupils are equal round and reactive to light extraocular motion is intact. Normocephalic and atraumatic.   Bilateral tympanic membranes are opaque and dull.  Left is worse than right.  There is no earwax in either ear canal.    Mucous membranes moist and pink.  No sinus tenderness on palpation.    Hearing is grossly normal and there is no otorrhea.     Chest: has bilateral rise with no increased work of breathing.    Cardiovascular: normal perfusion and brisk capillary refill.    Musculoskeletal: no gross focal abnormalities and normal gait.    Neuro: no gross focal abnormalities and memory seems intact.    Psychiatric: speech is clear and coherent and fluent. Patient dressed appropriately for the weather. Mood is appropriate and affect is full.        Discussed with patient to return to clinic if symptoms worsen or do not improve                      "

## 2023-08-11 ENCOUNTER — TELEPHONE (OUTPATIENT)
Dept: FAMILY MEDICINE | Facility: CLINIC | Age: 68
End: 2023-08-11
Payer: COMMERCIAL

## 2023-08-11 NOTE — TELEPHONE ENCOUNTER
VM left, requested return call to assist with scheduling an appointment sooner than 8/25/23 for ear follow up.

## 2023-08-11 NOTE — TELEPHONE ENCOUNTER
Reason for Call:  Appointment Request    Patient requesting this type of appt:  Follow up- ear issues    Requested provider:  Dr. Aguirre    Reason patient unable to be scheduled:  Did scheduled on 08/25, hoping to get in sooner    When does patient want to be seen/preferred time: 3-7 days    Comments: Ear issues are not getting better    Okay to leave a detailed message?: Yes at Home number on file [phone] (home)    Call taken on 8/11/2023 at 9:09 AM by India Sanderson

## 2023-08-14 ENCOUNTER — OFFICE VISIT (OUTPATIENT)
Dept: FAMILY MEDICINE | Facility: CLINIC | Age: 68
End: 2023-08-14
Payer: COMMERCIAL

## 2023-08-14 VITALS
DIASTOLIC BLOOD PRESSURE: 60 MMHG | HEIGHT: 68 IN | OXYGEN SATURATION: 97 % | RESPIRATION RATE: 16 BRPM | HEART RATE: 82 BPM | WEIGHT: 167.9 LBS | SYSTOLIC BLOOD PRESSURE: 100 MMHG | BODY MASS INDEX: 25.45 KG/M2

## 2023-08-14 DIAGNOSIS — H91.93 BILATERAL HEARING LOSS, UNSPECIFIED HEARING LOSS TYPE: Primary | ICD-10-CM

## 2023-08-14 DIAGNOSIS — E11.9 TYPE 2 DIABETES MELLITUS WITHOUT COMPLICATION, WITHOUT LONG-TERM CURRENT USE OF INSULIN (H): ICD-10-CM

## 2023-08-14 PROCEDURE — 99214 OFFICE O/P EST MOD 30 MIN: CPT | Performed by: FAMILY MEDICINE

## 2023-08-14 NOTE — PROGRESS NOTES
"  Assessment & Plan   Problem List Items Addressed This Visit          Nervous and Auditory    Hearing loss - Primary       Endocrine    Type 2 diabetes mellitus without complication, without long-term current use of insulin (H)   Patient with chronic hearing loss with acute worsening following swimming in a hotel while in [place].  Recently tender on July 13 and at that time it looks like he may have bilateral serous otitis media, was told to try Flonase.  He has not had any improvement.  On exam today there is no cerumen impaction, tympanic membranes look dull and gray.  No retraction.  Recommended that patient be seen by audiology and have them do tympanograms also.  He does have hearing aids and has an audiologist in [place].    Type 2 diabetes noted and taken into account for medical decision making.            BMI:   Estimated body mass index is 25.72 kg/m  as calculated from the following:    Height as of this encounter: 1.721 m (5' 7.75\").    Weight as of this encounter: 76.2 kg (167 lb 14.4 oz).           Sharla Aguirre MD  [facility]    Serg Joseph is a 67 year old, presenting for the following health issues:  Ear Problem (Pt here to follow up bilateral middle ear infection. He wants to make sure the infection has resolved. )        8/14/2023     1:55 PM   Additional Questions   Roomed by Ivone       History of Present Illness       Reason for visit:  Mid ear    He eats 2-3 servings of fruits and vegetables daily.He consumes 0 sweetened beverage(s) daily.He exercises with enough effort to increase his heart rate 30 to 60 minutes per day.  He exercises with enough effort to increase his heart rate 6 days per week.   He is taking medications regularly.               Review of Systems   HENT:  Positive for ear pain.             Objective    /60 (BP Location: Right arm, Patient Position: Sitting)   Pulse 82   Resp 16   Ht 1.721 m (5' 7.75\")   Wt 76.2 kg " (167 lb 14.4 oz)   SpO2 97%   BMI 25.72 kg/m    Body mass index is 25.72 kg/m .  Physical Exam

## 2023-09-05 ENCOUNTER — TELEPHONE (OUTPATIENT)
Dept: FAMILY MEDICINE | Facility: CLINIC | Age: 68
End: 2023-09-05
Payer: COMMERCIAL

## 2023-09-05 NOTE — TELEPHONE ENCOUNTER
"S-(situation): Patient calling regarding sinus concern and wanting to schedule earlier appointment for possible specialty referral.     B-(background): Per patient, he has had a \"blockage\" in his right nostril x1 month. He has been seen in clinic twice since the start and has been treated for hearing loss, eustachian tube dysfunction - has had no improvement with nasal blockage but has not gotten worse.    A-(assessment): Per patient, he does not have any SOB or trouble breathing - is able to breath fine out of his left nostril. He reports sinus pressure, some nasal dripping, and having to blow his nose frequently.     R-(recommendations): Patient has appointment scheduled with PCP for end of September but would like to be seen in clinic sooner to be evaluated sooner for possible ENT referral as sinus symptoms are bothersome. Scheduled with Jia Redman for 9/6/23 at 10:40am.   "

## 2023-09-06 ENCOUNTER — OFFICE VISIT (OUTPATIENT)
Dept: FAMILY MEDICINE | Facility: CLINIC | Age: 68
End: 2023-09-06
Payer: COMMERCIAL

## 2023-09-06 VITALS
OXYGEN SATURATION: 98 % | HEART RATE: 91 BPM | DIASTOLIC BLOOD PRESSURE: 80 MMHG | BODY MASS INDEX: 26.68 KG/M2 | RESPIRATION RATE: 16 BRPM | SYSTOLIC BLOOD PRESSURE: 132 MMHG | TEMPERATURE: 96.8 F | WEIGHT: 170 LBS | HEIGHT: 67 IN

## 2023-09-06 DIAGNOSIS — J33.9 NASAL POLYP: Primary | ICD-10-CM

## 2023-09-06 DIAGNOSIS — E11.9 TYPE 2 DIABETES MELLITUS WITHOUT COMPLICATION, WITHOUT LONG-TERM CURRENT USE OF INSULIN (H): ICD-10-CM

## 2023-09-06 PROCEDURE — 90471 IMMUNIZATION ADMIN: CPT

## 2023-09-06 PROCEDURE — 90662 IIV NO PRSV INCREASED AG IM: CPT

## 2023-09-06 PROCEDURE — 99213 OFFICE O/P EST LOW 20 MIN: CPT | Mod: 25

## 2023-09-06 NOTE — PROGRESS NOTES
"  Assessment & Plan     Nasal polyp  Patient with what appears to be right sided nasal polyp on exam.  Patient has been having nasal congestion just on right side for 2 months.  Does have frequent nasal congestion but right side has been almost completely blocked.  No sinus tenderness on exam, no fevers.  Small amount of clear rhinorrhea.  - Adult ENT  Referral; Future    Type 2 diabetes mellitus without complication, without long-term current use of insulin (H)  Annual eye exams at associated [facility] in [place].  Last hemoglobin A1c was 7.7 on 3/31/2023.  Advised to follow-up with primary care provider, patient is currently taking metformin.             BMI:   Estimated body mass index is 26.63 kg/m  as calculated from the following:    Height as of this encounter: 1.702 m (5' 7\").    Weight as of this encounter: 77.1 kg (170 lb).           LILIAN [facility]    Serg Joseph is a 67 year old, presenting for the following health issues:  blockage (Nasal Blockage right side x 2 months )      9/6/2023    10:44 AM   Additional Questions   Roomed by JIA Magana       Patient has had feeling of blocked right nasal passage for 2 months.     Has annual eye exams at [facility] in [place].    History of Present Illness       Reason for visit:  Nose blockage    He eats 0-1 servings of fruits and vegetables daily.He consumes 0 sweetened beverage(s) daily.He exercises with enough effort to increase his heart rate 30 to 60 minutes per day.  He exercises with enough effort to increase his heart rate 6 days per week.   He is taking medications regularly.               Review of Systems   Constitutional, HEENT, cardiovascular, pulmonary, gi and gu systems are negative, except as otherwise noted.      Objective    /80 (BP Location: Right arm, Patient Position: Sitting, Cuff Size: Adult Regular)   Pulse 91   Temp 96.8  F (36  C) (Tympanic)   Resp 16  " " Ht 1.702 m (5' 7\")   Wt 77.1 kg (170 lb)   SpO2 98%   BMI 26.63 kg/m    Body mass index is 26.63 kg/m .  Physical Exam  HENT:      Nose: Congestion present. No nasal deformity.      Right Nostril: Occlusion present.      Left Nostril: No occlusion.      Right Sinus: No maxillary sinus tenderness or frontal sinus tenderness.      Left Sinus: No maxillary sinus tenderness or frontal sinus tenderness.        GENERAL: healthy, alert and no distress  NECK: no adenopathy, no asymmetry, masses, or scars and thyroid normal to palpation  RESP: lungs clear to auscultation - no rales, rhonchi or wheezes  CV: regular rate and rhythm, normal S1 S2, no S3 or S4, no murmur, click or rub, no peripheral edema and peripheral pulses strong  MS: no gross musculoskeletal defects noted, no edema                      "

## 2023-10-02 ENCOUNTER — TRANSFERRED RECORDS (OUTPATIENT)
Dept: HEALTH INFORMATION MANAGEMENT | Facility: CLINIC | Age: 68
End: 2023-10-02
Payer: COMMERCIAL

## 2023-10-02 ENCOUNTER — TELEPHONE (OUTPATIENT)
Dept: FAMILY MEDICINE | Facility: CLINIC | Age: 68
End: 2023-10-02
Payer: COMMERCIAL

## 2023-10-02 NOTE — TELEPHONE ENCOUNTER
Call received from wife stating she received a letter from insurance and is inquiring about OV and if pt was given the flu shot. Writer informed pt was given high dose flu vaccine. Encouraged to call insurance for additional assistance.

## 2023-10-04 ENCOUNTER — TRANSFERRED RECORDS (OUTPATIENT)
Dept: HEALTH INFORMATION MANAGEMENT | Facility: CLINIC | Age: 68
End: 2023-10-04
Payer: COMMERCIAL

## 2023-10-09 ENCOUNTER — OFFICE VISIT (OUTPATIENT)
Dept: FAMILY MEDICINE | Facility: CLINIC | Age: 68
End: 2023-10-09
Payer: COMMERCIAL

## 2023-10-09 VITALS
HEART RATE: 82 BPM | BODY MASS INDEX: 26.73 KG/M2 | SYSTOLIC BLOOD PRESSURE: 134 MMHG | OXYGEN SATURATION: 95 % | TEMPERATURE: 97.5 F | HEIGHT: 67 IN | DIASTOLIC BLOOD PRESSURE: 78 MMHG | RESPIRATION RATE: 16 BRPM | WEIGHT: 170.3 LBS

## 2023-10-09 DIAGNOSIS — E78.5 DYSLIPIDEMIA: ICD-10-CM

## 2023-10-09 DIAGNOSIS — J34.89 NASAL OBSTRUCTION: Primary | ICD-10-CM

## 2023-10-09 DIAGNOSIS — E11.9 TYPE 2 DIABETES MELLITUS WITHOUT COMPLICATION, WITHOUT LONG-TERM CURRENT USE OF INSULIN (H): ICD-10-CM

## 2023-10-09 DIAGNOSIS — Z01.818 PREOP GENERAL PHYSICAL EXAM: ICD-10-CM

## 2023-10-09 DIAGNOSIS — I10 PRIMARY HYPERTENSION: ICD-10-CM

## 2023-10-09 LAB
ANION GAP SERPL CALCULATED.3IONS-SCNC: 13 MMOL/L (ref 7–15)
BUN SERPL-MCNC: 19.7 MG/DL (ref 8–23)
CALCIUM SERPL-MCNC: 9.4 MG/DL (ref 8.8–10.2)
CHLORIDE SERPL-SCNC: 102 MMOL/L (ref 98–107)
CREAT SERPL-MCNC: 0.63 MG/DL (ref 0.67–1.17)
DEPRECATED HCO3 PLAS-SCNC: 22 MMOL/L (ref 22–29)
EGFRCR SERPLBLD CKD-EPI 2021: >90 ML/MIN/1.73M2
ERYTHROCYTE [DISTWIDTH] IN BLOOD BY AUTOMATED COUNT: 12.3 % (ref 10–15)
GLUCOSE SERPL-MCNC: 125 MG/DL (ref 70–99)
HBA1C MFR BLD: 7.7 % (ref 0–5.6)
HCT VFR BLD AUTO: 43.8 % (ref 40–53)
HGB BLD-MCNC: 14.8 G/DL (ref 13.3–17.7)
MCH RBC QN AUTO: 31.2 PG (ref 26.5–33)
MCHC RBC AUTO-ENTMCNC: 33.8 G/DL (ref 31.5–36.5)
MCV RBC AUTO: 92 FL (ref 78–100)
PLATELET # BLD AUTO: 256 10E3/UL (ref 150–450)
POTASSIUM SERPL-SCNC: 4.2 MMOL/L (ref 3.4–5.3)
RBC # BLD AUTO: 4.74 10E6/UL (ref 4.4–5.9)
SODIUM SERPL-SCNC: 137 MMOL/L (ref 135–145)
WBC # BLD AUTO: 7.5 10E3/UL (ref 4–11)

## 2023-10-09 PROCEDURE — 83036 HEMOGLOBIN GLYCOSYLATED A1C: CPT | Performed by: INTERNAL MEDICINE

## 2023-10-09 PROCEDURE — 90480 ADMN SARSCOV2 VAC 1/ONLY CMP: CPT | Performed by: INTERNAL MEDICINE

## 2023-10-09 PROCEDURE — 36415 COLL VENOUS BLD VENIPUNCTURE: CPT | Performed by: INTERNAL MEDICINE

## 2023-10-09 PROCEDURE — 93000 ELECTROCARDIOGRAM COMPLETE: CPT | Performed by: INTERNAL MEDICINE

## 2023-10-09 PROCEDURE — 80048 BASIC METABOLIC PNL TOTAL CA: CPT | Performed by: INTERNAL MEDICINE

## 2023-10-09 PROCEDURE — 85027 COMPLETE CBC AUTOMATED: CPT | Performed by: INTERNAL MEDICINE

## 2023-10-09 PROCEDURE — 99214 OFFICE O/P EST MOD 30 MIN: CPT | Mod: 25 | Performed by: INTERNAL MEDICINE

## 2023-10-09 PROCEDURE — 91320 SARSCV2 VAC 30MCG TRS-SUC IM: CPT | Performed by: INTERNAL MEDICINE

## 2023-10-09 RX ORDER — DAPAGLIFLOZIN 10 MG/1
10 TABLET, FILM COATED ORAL DAILY
Qty: 90 TABLET | Refills: 3
Start: 2023-10-09 | End: 2024-05-16

## 2023-10-09 ASSESSMENT — ENCOUNTER SYMPTOMS
HEADACHES: 0
BRUISES/BLEEDS EASILY: 0
FATIGUE: 0
ABDOMINAL PAIN: 0
VOMITING: 0
SLEEP DISTURBANCE: 0
COUGH: 0
DIFFICULTY URINATING: 0
PALPITATIONS: 0
SHORTNESS OF BREATH: 0
MYALGIAS: 0
DIARRHEA: 0
DYSURIA: 0
UNEXPECTED WEIGHT CHANGE: 0

## 2023-10-09 NOTE — PROGRESS NOTES
[address] [address]  Phone: [phone]  Fax: [phone]  Primary Provider: Janna Drummond  Pre-op Performing Provider: JANNA DRUMMOND      PREOPERATIVE EVALUATION:  Today's date: 10/9/2023    Opal is a 67 year old male who presents for a preoperative evaluation.      Surgical Information:  Surgery/Procedure:  Right maxillary antrostomy, Right total ethmoidectomy with sphenoidotomy with removal of tissue from the sphenoid sinus  Surgery Location: [facility]  Surgeon: Dr. Hampton  Surgery Date: 10/26/23  Time of Surgery: Unknown  Where patient plans to recover: At home with family  Fax number for surgical facility: [phone]    Assessment & Plan     The proposed surgical procedure is considered INTERMEDIATE risk.    Problem List Items Addressed This Visit       Primary hypertension     Pressure controlled with lisinopril 10/HCTZ 12.5 daily         Type 2 diabetes mellitus without complication, without long-term current use of insulin (H)     On metformin XR 2000 mg daily and dapagliflozin 10 mg daily   On statin and ACE  diabetes is improving/stable/worsening: unchanged.  Medication changes per orders.  Diabetes will be reassessed in 6 months.         Dyslipidemia     Controlled by pravastatin 40 mg daily          Other Visit Diagnoses       Nasal obstruction    -  Primary    Preop general physical exam        Relevant Orders    EKG 12-lead complete w/read - Clinics (Completed)                   Risks and Recommendations:  The patient has the following additional risks and recommendations for perioperative complications:  Diabetes:  - Patient is not on insulin therapy: regular NPO guidelines can be followed.     Antiplatelet or Anticoagulation Medication Instructions:   - Patient is on no antiplatelet or anticoagulation medications.    Additional Medication Instructions:   - ACE/ARB: HOLD on day of surgery (minimum 11 hours for  general anesthesia).   - metformin: HOLD day of surgery.   - SGLT2 Inhibitor (canagliflozin, dapagliflozin, or empagliflozin): HOLD 3 days before surgery.     RECOMMENDATION:  APPROVAL GIVEN to proceed with proposed procedure, without further diagnostic evaluation.            Subjective       HPI related to upcoming procedure: Scheduled for surgery because of an obstructive mass in the right nostril.  He has an excellent functional capacity.  He exercises 6 times per week including running 4 to 5 miles.  No history of cardiopulmonary disease.  No history of surgical bleeding.  No history of problems with anesthesia.  No snoring or sleep apnea.        10/9/2023    10:44 AM   Preop Questions   1. Have you ever had a heart attack or stroke? No   2. Have you ever had surgery on your heart or blood vessels, such as a stent placement, a coronary artery bypass, or surgery on an artery in your head, neck, heart, or legs? No   3. Do you have chest pain with activity? No   4. Do you have a history of  heart failure? No   5. Do you currently have a cold, bronchitis or symptoms of other infection? No   6. Do you have a cough, shortness of breath, or wheezing? No   7. Do you or anyone in your family have previous history of blood clots? No   8. Do you or does anyone in your family have a serious bleeding problem such as prolonged bleeding following surgeries or cuts? No   9. Have you ever had problems with anemia or been told to take iron pills? No   10. Have you had any abnormal blood loss such as black, tarry or bloody stools? No   11. Have you ever had a blood transfusion? No   12. Are you willing to have a blood transfusion if it is medically needed before, during, or after your surgery? Yes   13. Have you or any of your relatives ever had problems with anesthesia? No   14. Do you have sleep apnea, excessive snoring or daytime drowsiness? No   15. Do you have any artifical heart valves or other implanted medical devices like  a pacemaker, defibrillator, or continuous glucose monitor? No   16. Do you have artificial joints? No   17. Are you allergic to latex? No       Health Care Directive:  Patient does not have a Health Care Directive or Living Will: Discussed advance care planning with patient; information given to patient to review.    Preoperative Review of :   reviewed - no record of controlled substances prescribed.          Review of Systems   Constitutional:  Negative for fatigue and unexpected weight change.   Eyes:  Negative for visual disturbance.   Respiratory:  Negative for cough and shortness of breath.    Cardiovascular:  Negative for chest pain, palpitations and peripheral edema.   Gastrointestinal:  Negative for abdominal pain, diarrhea and vomiting.   Endocrine: Negative for polyuria.   Genitourinary:  Negative for difficulty urinating and dysuria.   Musculoskeletal:  Negative for myalgias.   Neurological:  Negative for headaches.   Hematological:  Does not bruise/bleed easily.   Psychiatric/Behavioral:  Negative for sleep disturbance.          No snoring         Patient Active Problem List    Diagnosis Date Noted    Dyslipidemia 03/07/2022     Priority: Medium    Hearing loss 03/07/2022     Priority: Medium    Microscopic hematuria 03/07/2022     Priority: Medium    Type 2 diabetes mellitus without complication, without long-term current use of insulin (H) 01/01/2007     Priority: Medium    Primary hypertension 01/01/2000     Priority: Medium      Past Medical History:   Diagnosis Date    Unspecified essential hypertension     Essential hypertension     Past Surgical History:   Procedure Laterality Date    COLOGLIZ(EXACT SCIENCES)  12/10/2018    COLOGLIZ(EXACT SCIENCES)  11/18/2015    CYSTOSCOPY  2000     Current Outpatient Medications   Medication Sig Dispense Refill    lisinopril-hydrochlorothiazide (ZESTORETIC) 20-12.5 MG tablet Take 1 tablet by mouth daily 90 tablet 3    metFORMIN (GLUCOPHAGE XR) 500 MG 24  "hr tablet Take 4 tablets (2,000 mg) by mouth daily 360 tablet 3    pravastatin (PRAVACHOL) 40 MG tablet Take 1 tablet (40 mg) by mouth daily 90 tablet 3       No Known Allergies     Social History     Tobacco Use    Smoking status: Never     Passive exposure: Never    Smokeless tobacco: Never    Tobacco comments:     states he never has smokes   Substance Use Topics    Alcohol use: Yes     Alcohol/week: 6.0 standard drinks of alcohol     Types: 6 Cans of beer per week     Comment: occasional beer     Family History   Problem Relation Age of Onset    Hypertension Father     Lung Cancer Father     Diabetes Type 2  Father     Diabetes No family hx of     Eye Disorder No family hx of      History   Drug Use Unknown         Objective     /78 (BP Location: Right arm, Patient Position: Sitting, Cuff Size: Adult Large)   Pulse 82   Temp 97.5  F (36.4  C) (Tympanic)   Resp 16   Ht 1.702 m (5' 7\")   Wt 77.2 kg (170 lb 4.8 oz)   SpO2 95%   BMI 26.67 kg/m      Physical Exam  Healthy-appearing man in no distress  Eyes appear normal  HEENT exam reveals a mass of visible in the right nostril, TM is normal, hearing aid left ear, oropharynx unremarkable  Neck supple no Notley thyromegaly  Lungs clear  Cardiac exam regular no murmur or edema, normal carotid and posterior tibial pulsations  Abdomen soft and nontender, no hepatosplenomegaly  Patient is alert and oriented with fluent speech and intact memory, no facial asymmetry, EOMI, palate midline, normal strength and gait  Normal mood and affect    Recent Labs   Lab Test 03/31/23  0814 06/13/22  0719    138   POTASSIUM 5.0 4.4   CR 0.75 0.66   A1C 7.7* 7.8*        Diagnostics:  Labs pending at this time.  Results will be reviewed when available.   EKG: Sinus 78, LVH    Revised Cardiac Risk Index (RCRI):  The patient has the following serious cardiovascular risks for perioperative complications:   - No serious cardiac risks = 0 points     RCRI Interpretation: 0 " points: Class I (very low risk - 0.4% complication rate)         Signed Electronically by: Aiden Drummond MD  Copy of this evaluation report is provided to requesting physician.

## 2023-10-09 NOTE — ASSESSMENT & PLAN NOTE
On metformin XR 2000 mg daily and dapagliflozin 10 mg daily   On statin and ACE  diabetes is improving/stable/worsening: unchanged.  Medication changes per orders.  Diabetes will be reassessed in 6 months.

## 2023-10-09 NOTE — LETTER
October 10, 2023      Opal Benitez  [address] [address]        Dear ,    We are writing to inform you of your test results.    Your diabetes is not as well-controlled as I would like to see with a hemoglobin A1c of 7.7.  I like to see this less than 7.  Please schedule a visit.     Resulted Orders   Hemoglobin A1c   Result Value Ref Range    Hemoglobin A1C 7.7 (H) 0.0 - 5.6 %      Comment:      Normal <5.7%   Prediabetes 5.7-6.4%    Diabetes 6.5% or higher     Note: Adopted from ADA consensus guidelines.   Basic metabolic panel  (Ca, Cl, CO2, Creat, Gluc, K, Na, BUN)   Result Value Ref Range    Sodium 137 135 - 145 mmol/L      Comment:      Reference intervals for this test were updated on 09/26/2023 to more accurately reflect our healthy population. There may be differences in the flagging of prior results with similar values performed with this method. Interpretation of those prior results can be made in the context of the updated reference intervals.     Potassium 4.2 3.4 - 5.3 mmol/L    Chloride 102 98 - 107 mmol/L    Carbon Dioxide (CO2) 22 22 - 29 mmol/L    Anion Gap 13 7 - 15 mmol/L    Urea Nitrogen 19.7 8.0 - 23.0 mg/dL    Creatinine 0.63 (L) 0.67 - 1.17 mg/dL    GFR Estimate >90 >60 mL/min/1.73m2    Calcium 9.4 8.8 - 10.2 mg/dL    Glucose 125 (H) 70 - 99 mg/dL   CBC with platelets   Result Value Ref Range    WBC Count 7.5 4.0 - 11.0 10e3/uL    RBC Count 4.74 4.40 - 5.90 10e6/uL    Hemoglobin 14.8 13.3 - 17.7 g/dL    Hematocrit 43.8 40.0 - 53.0 %    MCV 92 78 - 100 fL    MCH 31.2 26.5 - 33.0 pg    MCHC 33.8 31.5 - 36.5 g/dL    RDW 12.3 10.0 - 15.0 %    Platelet Count 256 150 - 450 10e3/uL       If you have any questions or concerns, please call the clinic at the number listed above.       Sincerely,      Aiden Drummond MD

## 2023-10-10 NOTE — PROGRESS NOTES
Pt preop Px and labwork faxed to [facility] [phone]  Yannick Castellon CMA

## 2023-10-26 ENCOUNTER — TRANSFERRED RECORDS (OUTPATIENT)
Dept: HEALTH INFORMATION MANAGEMENT | Facility: CLINIC | Age: 68
End: 2023-10-26
Payer: COMMERCIAL

## 2023-11-08 ENCOUNTER — TRANSFERRED RECORDS (OUTPATIENT)
Dept: HEALTH INFORMATION MANAGEMENT | Facility: CLINIC | Age: 68
End: 2023-11-08
Payer: COMMERCIAL

## 2023-11-15 ENCOUNTER — MEDICAL CORRESPONDENCE (OUTPATIENT)
Dept: HEALTH INFORMATION MANAGEMENT | Facility: CLINIC | Age: 68
End: 2023-11-15
Payer: COMMERCIAL

## 2023-11-15 ENCOUNTER — TRANSCRIBE ORDERS (OUTPATIENT)
Dept: OTHER | Age: 68
End: 2023-11-15

## 2023-11-15 DIAGNOSIS — J34.89 OTHER SPECIFIED DISORDERS OF NOSE AND NASAL SINUSES: Primary | ICD-10-CM

## 2023-11-15 DIAGNOSIS — J34.89 NASAL OBSTRUCTION: ICD-10-CM

## 2023-11-17 ENCOUNTER — TRANSFERRED RECORDS (OUTPATIENT)
Dept: HEALTH INFORMATION MANAGEMENT | Facility: CLINIC | Age: 68
End: 2023-11-17
Payer: COMMERCIAL

## 2023-11-20 ENCOUNTER — TRANSFERRED RECORDS (OUTPATIENT)
Dept: HEALTH INFORMATION MANAGEMENT | Facility: CLINIC | Age: 68
End: 2023-11-20
Payer: COMMERCIAL

## 2023-11-27 ENCOUNTER — TELEPHONE (OUTPATIENT)
Dept: OTOLARYNGOLOGY | Facility: CLINIC | Age: 68
End: 2023-11-27
Payer: COMMERCIAL

## 2023-11-27 ENCOUNTER — TRANSCRIBE ORDERS (OUTPATIENT)
Dept: OTHER | Age: 68
End: 2023-11-27

## 2023-11-27 ENCOUNTER — MEDICAL CORRESPONDENCE (OUTPATIENT)
Dept: HEALTH INFORMATION MANAGEMENT | Facility: CLINIC | Age: 68
End: 2023-11-27
Payer: COMMERCIAL

## 2023-11-27 DIAGNOSIS — C31.9: Primary | ICD-10-CM

## 2023-11-27 NOTE — TELEPHONE ENCOUNTER
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

## 2023-11-30 NOTE — TELEPHONE ENCOUNTER
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 Path requested -Lisbeth  December 1, 2023 9:04 AM - Patient's spouse returned call and stated no records with [facility]. All records and surgery were at [facility] and patient CT sinus in Oct was also done in-house. Called [facility] and spoke to  rep, she will send op report and CT report done in house. USPS label created and send to facility for imaging disc -Lisbeth  ** received from [facility] duplicate op report 10/26/23 (already scanned in), send CT report to scanning -Lisbeth  December 4, 2023 10:31 AM - Called [facility] Path Consult # [phone] opt 3, Lm to follow up on path request send on 11/30. Track fedex tracking # and it has not been sent out.  Lm with CB number -Lisbeth  December 5, 2023 9:27 AM - Called [facility] Consult x2 and Lm with CB number. -Lisbeth  December 6, 2023 9:25 AM - Called [facility] and spoke to medical records, disc was sent out to the business office on 12/4. They will be using the USPS label attached. Called [facility] Path Consult and spoke to path lab person, slides are pending. Slides will be delay due to being pull on a special case and is not done yet. [facility]  Imaging Received  December 12, 2023 1:54 PM Kamryn   Action: Imaging disc from [facility] received and taken to [facility] for upload.     Pathology Received December 19, 2023 11:41 AM  [id number]     Action Taken 17 slides 10/26/23 Case: [id number] received from [facility] and sent to 5th floor path lab for review with consult form.

## 2023-12-13 ENCOUNTER — TRANSFERRED RECORDS (OUTPATIENT)
Dept: HEALTH INFORMATION MANAGEMENT | Facility: CLINIC | Age: 68
End: 2023-12-13
Payer: COMMERCIAL

## 2023-12-15 NOTE — PATIENT INSTRUCTIONS
1. Plan to complete MRI and PET CT,   2. Please call the ENT clinic with any questions,concerns, new or worsening symptoms.    -Clinic number is [phone]   - Peg's direct line (Dr. Cole's nurse) [phone]

## 2023-12-21 ENCOUNTER — OFFICE VISIT (OUTPATIENT)
Dept: OTOLARYNGOLOGY | Facility: CLINIC | Age: 68
End: 2023-12-21
Payer: COMMERCIAL

## 2023-12-21 ENCOUNTER — PRE VISIT (OUTPATIENT)
Dept: OTOLARYNGOLOGY | Facility: CLINIC | Age: 68
End: 2023-12-21

## 2023-12-21 VITALS
DIASTOLIC BLOOD PRESSURE: 90 MMHG | SYSTOLIC BLOOD PRESSURE: 161 MMHG | OXYGEN SATURATION: 98 % | BODY MASS INDEX: 27.08 KG/M2 | WEIGHT: 172.9 LBS | HEART RATE: 118 BPM

## 2023-12-21 DIAGNOSIS — C31.9: ICD-10-CM

## 2023-12-21 PROCEDURE — 31231 NASAL ENDOSCOPY DX: CPT | Performed by: OTOLARYNGOLOGY

## 2023-12-21 PROCEDURE — 99205 OFFICE O/P NEW HI 60 MIN: CPT | Mod: 25 | Performed by: OTOLARYNGOLOGY

## 2023-12-21 RX ORDER — CHLORAL HYDRATE 500 MG
2 CAPSULE ORAL DAILY
COMMUNITY
End: 2024-06-13

## 2023-12-21 ASSESSMENT — PAIN SCALES - GENERAL: PAINLEVEL: MODERATE PAIN (5)

## 2023-12-21 NOTE — PROGRESS NOTES
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  The patient underwent right maxillary antrostomy, right total ethmoidectomy, right sphenoidotomy with removal of tissue on October 26, 2023.  The surgery was performed by Dr. Shawn Hampton at [facility].  He is currently 8 weeks out from surgery.  The patient states that he does not have any symptoms currently.  After the surgery he states that his right sinonasal passages have improved substantially.  He does not have does not have any nasal congestion no nasal obstruction no epistaxis.    MEDICATIONS:     Current Outpatient Medications   Medication Sig Dispense Refill     amoxicillin-clavulanate (AUGMENTIN) 875-125 MG tablet        dapagliflozin (FARXIGA) 10 MG TABS tablet Take 1 tablet (10 mg) by mouth daily 90 tablet 3     fish oil-omega-3 fatty acids 1000 MG capsule Take 2 g by mouth daily       lisinopril-hydrochlorothiazide (ZESTORETIC) 20-12.5 MG tablet Take 1 tablet by mouth daily 90 tablet 3     metFORMIN (GLUCOPHAGE XR) 500 MG 24 hr tablet Take 4 tablets (2,000 mg) by mouth daily 360 tablet 3     pravastatin (PRAVACHOL) 40 MG tablet Take 1 tablet (40 mg) by mouth daily 90 tablet 3       ALLERGIES:  No Known Allergies    HABITS/SOCIAL HISTORY: .No smoking, no alcohol    PAST MEDICAL HISTORY:   Past Medical History:   Diagnosis Date     Unspecified essential hypertension     Essential hypertension        FAMILY HISTORY:    Family History   Problem Relation Age of Onset     Hypertension Father      Lung Cancer Father      Diabetes Type 2  Father      Diabetes No family hx of      Eye Disorder No family hx of        REVIEW OF SYSTEMS:  12 point ROS was negative other than the symptoms noted above in the  HPI.    PHYSICAL EXAMINATION:      Constitutional:  The patient was unaccompanied, well-groomed, and in no acute distress.     Skin: Normal:  warm and pink without rash   Neurologic: Alert and oriented x 3.  CN's III-XII within normal limits.  Voice normal.    Psychiatric: The patient's affect was calm, cooperative, and appropriate.     Communication:  Normal; communicates verbally, normal voice quality.   Respiratory: Breathing comfortably without stridor or exertion of accessory muscles.    Head/Face:  Normocephalic and atraumatic.  No lesions or scars. No sinus tenderness.    Salivary glands -  Normal size, no tenderness, swelling, or palpable masses   Eyes: Pupils were equal and reactive.  Extraocular movement intact.     Ears: Pinnae and tragus non-tender.  EAC's and TM's were clear.      Nose: Sinuses were non-tender.  Anterior rhinoscopy revealed midline septum and absence of purulence or polyps.     Oral Cavity: Normal tongue, floor of mouth, buccal mucosa, and palate.  No lesions or masses on inspection or palpation.     Oropharynx: Normal mucosa, palate symmetric with normal elevation. No abnormal lymph tissue in the oropharynx.              Neck: Supple with normal laryngeal and tracheal landmarks.  The parotid beds were without masses.  No palpable thyroid.  Normal range of motion   Lymphatic: There is no palpable lymphadenopathy in the neck.          Nasal endoscopy: Consent for nasal endoscopy was obtained.  I confirmed correctness of the procedure and identity of the patient.  Nasal endoscopy was indicated due to recent diagnosis of right sinonasal carcinoma.  The nose was topically decongested and anesthetized.  The nasal endoscope was passed under endoscopic vision.  The septum is in the midline.  On the left side the inferior middle turbinate are within normal limits.  The left middle meatus is normal.  The nasopharynx is normal.  On the right side I see evidence of the  sinus surgery.  Right  maxillary antrostomy and  sphenoidotomy.  I gain access into the sphenoid sinus.  The sphenoid sinus is wide open and empty.  There is some granulation- papilloma tissue in the area of the anterior posterior ethmoids.       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  He is currently 8 weeks out after functional endoscopic sinus surgery on the right.  Nasal endoscopy to the today reveals some granulation- papilloma-carcinoma on the right ethmoid.  This is a very small amount of lesion in this area.  So I am going to obtain MRI of the sinuses as well as a PET/CT to stage the patient.  In regard to plan most likely oncologic resection after we obtain the scans.        Karina Kumar MD, M.D.  Otolaryngology- Head & Neck Surgery  [phone]

## 2023-12-21 NOTE — NURSING NOTE
One week after surgery at [facility], migraine pain, cloverly, spoke with doctor they dont think its related, feeling constant, patient is suspicious that migran pain is related to cancer, patient was advised to bring ct done by [facility] to this appointment today. CT was performed in early October. This discomfort inhibits ability to speak.

## 2023-12-21 NOTE — LETTER
12/21/2023       RE: Opal Benitez  [address] [facility] [address]     Dear Colleague,    Thank you for referring your patient, Opal Benitez, to the [facility] [place] [facility]. Please see a copy of my visit note below.    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  The patient underwent right maxillary antrostomy, right total ethmoidectomy, right sphenoidotomy with removal of tissue on October 26, 2023.  The surgery was performed by Dr. Shawn Hampton at [facility].  He is currently 8 weeks out from surgery.  The patient states that he does not have any symptoms currently.  After the surgery he states that his right sinonasal passages have improved substantially.  He does not have does not have any nasal congestion no nasal obstruction no epistaxis.    MEDICATIONS:     Current Outpatient Medications   Medication Sig Dispense Refill     amoxicillin-clavulanate (AUGMENTIN) 875-125 MG tablet        dapagliflozin (FARXIGA) 10 MG TABS tablet Take 1 tablet (10 mg) by mouth daily 90 tablet 3     fish oil-omega-3 fatty acids 1000 MG capsule Take 2 g by mouth daily       lisinopril-hydrochlorothiazide (ZESTORETIC) 20-12.5 MG tablet Take 1 tablet by mouth daily 90 tablet 3     metFORMIN (GLUCOPHAGE XR) 500 MG 24 hr tablet Take 4 tablets (2,000 mg) by mouth daily 360 tablet 3     pravastatin (PRAVACHOL) 40 MG tablet Take 1 tablet (40 mg) by mouth daily 90 tablet 3       ALLERGIES:  No Known Allergies    HABITS/SOCIAL HISTORY: .No smoking, no alcohol    PAST MEDICAL HISTORY:   Past Medical History:   Diagnosis Date     Unspecified essential hypertension     Essential hypertension         FAMILY HISTORY:    Family History   Problem Relation Age of Onset     Hypertension Father      Lung Cancer Father      Diabetes Type 2  Father      Diabetes No family hx of      Eye Disorder No family hx of        REVIEW OF SYSTEMS:  12 point ROS was negative other than the symptoms noted above in the HPI.    PHYSICAL EXAMINATION:      Constitutional:  The patient was unaccompanied, well-groomed, and in no acute distress.     Skin: Normal:  warm and pink without rash   Neurologic: Alert and oriented x 3.  CN's III-XII within normal limits.  Voice normal.    Psychiatric: The patient's affect was calm, cooperative, and appropriate.     Communication:  Normal; communicates verbally, normal voice quality.   Respiratory: Breathing comfortably without stridor or exertion of accessory muscles.    Head/Face:  Normocephalic and atraumatic.  No lesions or scars. No sinus tenderness.    Salivary glands -  Normal size, no tenderness, swelling, or palpable masses   Eyes: Pupils were equal and reactive.  Extraocular movement intact.     Ears: Pinnae and tragus non-tender.  EAC's and TM's were clear.      Nose: Sinuses were non-tender.  Anterior rhinoscopy revealed midline septum and absence of purulence or polyps.     Oral Cavity: Normal tongue, floor of mouth, buccal mucosa, and palate.  No lesions or masses on inspection or palpation.     Oropharynx: Normal mucosa, palate symmetric with normal elevation. No abnormal lymph tissue in the oropharynx.              Neck: Supple with normal laryngeal and tracheal landmarks.  The parotid beds were without masses.  No palpable thyroid.  Normal range of motion   Lymphatic: There is no palpable lymphadenopathy in the neck.          Nasal endoscopy: Consent for nasal endoscopy was obtained.  I confirmed correctness of the procedure and identity of the patient.  Nasal endoscopy was indicated due to recent diagnosis of right sinonasal carcinoma.  The nose was topically decongested and  anesthetized.  The nasal endoscope was passed under endoscopic vision.  The septum is in the midline.  On the left side the inferior middle turbinate are within normal limits.  The left middle meatus is normal.  The nasopharynx is normal.  On the right side I see evidence of the  sinus surgery.  Right maxillary antrostomy and  sphenoidotomy.  I gain access into the sphenoid sinus.  The sphenoid sinus is wide open and empty.  There is some granulation- papilloma tissue in the area of the anterior posterior ethmoids.       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  He is currently 8 weeks out after functional endoscopic sinus surgery on the right.  Nasal endoscopy to the today reveals some granulation- papilloma-carcinoma on the right ethmoid.  This is a very small amount of lesion in this area.  So I am going to obtain MRI of the sinuses as well as a PET/CT to stage the patient.  In regard to plan most likely oncologic resection after we obtain the scans.        Karina Kumar MD, M.D.  Otolaryngology- Head & Neck Surgery  [phone]           Again, thank you for allowing me to participate in the care of your patient.      Sincerely,    Karina Kumar MD

## 2023-12-26 ENCOUNTER — LAB REQUISITION (OUTPATIENT)
Dept: LAB | Facility: CLINIC | Age: 68
End: 2023-12-26
Payer: COMMERCIAL

## 2023-12-26 PROCEDURE — 88365 INSITU HYBRIDIZATION (FISH): CPT | Mod: TC | Performed by: OTOLARYNGOLOGY

## 2023-12-26 PROCEDURE — 88321 CONSLTJ&REPRT SLD PREP ELSWR: CPT | Performed by: STUDENT IN AN ORGANIZED HEALTH CARE EDUCATION/TRAINING PROGRAM

## 2023-12-26 PROCEDURE — 88365 INSITU HYBRIDIZATION (FISH): CPT | Mod: 26 | Performed by: STUDENT IN AN ORGANIZED HEALTH CARE EDUCATION/TRAINING PROGRAM

## 2023-12-29 ENCOUNTER — HOSPITAL ENCOUNTER (OUTPATIENT)
Dept: PET IMAGING | Facility: CLINIC | Age: 68
Discharge: HOME | End: 2023-12-29
Attending: OTOLARYNGOLOGY
Payer: COMMERCIAL

## 2023-12-29 DIAGNOSIS — C31.9: ICD-10-CM

## 2023-12-29 PROCEDURE — 74177 CT ABD & PELVIS W/CONTRAST: CPT | Mod: 26 | Performed by: RADIOLOGY

## 2023-12-29 PROCEDURE — 250N000011 HC RX IP 250 OP 636: Performed by: OTOLARYNGOLOGY

## 2023-12-29 PROCEDURE — 343N000001 HC RX 343: Performed by: OTOLARYNGOLOGY

## 2023-12-29 PROCEDURE — A9552 F18 FDG: HCPCS | Performed by: OTOLARYNGOLOGY

## 2023-12-29 PROCEDURE — 71260 CT THORAX DX C+: CPT | Mod: 26 | Performed by: RADIOLOGY

## 2023-12-29 PROCEDURE — 70491 CT SOFT TISSUE NECK W/DYE: CPT

## 2023-12-29 PROCEDURE — 74177 CT ABD & PELVIS W/CONTRAST: CPT

## 2023-12-29 PROCEDURE — 78815 PET IMAGE W/CT SKULL-THIGH: CPT | Mod: 26 | Performed by: RADIOLOGY

## 2023-12-29 PROCEDURE — 70491 CT SOFT TISSUE NECK W/DYE: CPT | Mod: 26 | Performed by: RADIOLOGY

## 2023-12-29 PROCEDURE — 78815 PET IMAGE W/CT SKULL-THIGH: CPT | Mod: PI

## 2023-12-29 RX ORDER — IOPAMIDOL 755 MG/ML
10-135 INJECTION, SOLUTION INTRAVASCULAR ONCE
Status: COMPLETED | OUTPATIENT
Start: 2023-12-29 | End: 2023-12-29

## 2023-12-29 RX ADMIN — FLUDEOXYGLUCOSE F-18 10.33 MILLICURIE: 500 INJECTION, SOLUTION INTRAVENOUS at 14:14

## 2023-12-29 RX ADMIN — IOPAMIDOL 105 ML: 755 INJECTION, SOLUTION INTRAVENOUS at 15:13

## 2024-01-05 LAB
PATH REPORT.ADDENDUM SPEC: ABNORMAL
PATH REPORT.COMMENTS IMP SPEC: ABNORMAL
PATH REPORT.COMMENTS IMP SPEC: YES
PATH REPORT.FINAL DX SPEC: ABNORMAL
PATH REPORT.GROSS SPEC: ABNORMAL
PATH REPORT.MICROSCOPIC SPEC OTHER STN: ABNORMAL
PATH REPORT.RELEVANT HX SPEC: ABNORMAL
PATH REPORT.RELEVANT HX SPEC: ABNORMAL
PATH REPORT.SITE OF ORIGIN SPEC: ABNORMAL

## 2024-01-09 ENCOUNTER — ANCILLARY PROCEDURE (OUTPATIENT)
Dept: MRI IMAGING | Facility: CLINIC | Age: 69
End: 2024-01-09
Attending: OTOLARYNGOLOGY
Payer: COMMERCIAL

## 2024-01-09 PROCEDURE — A9585 GADOBUTROL INJECTION: HCPCS | Performed by: RADIOLOGY

## 2024-01-09 PROCEDURE — 70543 MRI ORBT/FAC/NCK W/O &W/DYE: CPT | Mod: GC | Performed by: RADIOLOGY

## 2024-01-09 RX ORDER — GADOBUTROL 604.72 MG/ML
10 INJECTION INTRAVENOUS ONCE
Status: COMPLETED | OUTPATIENT
Start: 2024-01-09 | End: 2024-01-09

## 2024-01-09 RX ADMIN — GADOBUTROL 8 ML: 604.72 INJECTION INTRAVENOUS at 13:39

## 2024-01-09 NOTE — DISCHARGE INSTRUCTIONS
MRI Contrast Discharge Instructions    The IV contrast you received today will pass out of your body in your  urine. This will happen in the next 24 hours. You will not feel this process.  Your urine will not change color.    Drink at least 4 extra glasses of water or juice today (unless your doctor  has restricted your fluids). This reduces the stress on your kidneys.  You may take your regular medicines.    If you are on dialysis: It is best to have dialysis today.    If you have a reaction: Most reactions happen right away. If you have  any new symptoms after leaving the hospital (such as hives or swelling),  call your hospital at the correct number below. Or call your family doctor.  If you have breathing distress or wheezing, call 911.    Special instructions: ***    I have read and understand the above information.    Signature:______________________________________ Date:___________    Staff:__________________________________________ Date:___________     Time:__________    [place] [facility] Departments:    ___[place]: [phone]  ___[facility]: [phone]  ___[place]: [phone] ___[facility]: [phone]  ___[facility]: [phone]  ___[facility]: [phone]  ___[phone]  ___[facility]: [phone]  ___[phone]

## 2024-01-09 NOTE — PROGRESS NOTES
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  Patient had right  transnasal endoscopic resection on October 26, 2023.  The patient is here to discuss the results of the sinus MRI as well as PET/CT.   Today he brought up that his right sided cheek region is numb.    MEDICATIONS:     Current Outpatient Medications   Medication Sig Dispense Refill     amoxicillin-clavulanate (AUGMENTIN) 875-125 MG tablet        dapagliflozin (FARXIGA) 10 MG TABS tablet Take 1 tablet (10 mg) by mouth daily 90 tablet 3     fish oil-omega-3 fatty acids 1000 MG capsule Take 2 g by mouth daily       lisinopril-hydrochlorothiazide (ZESTORETIC) 20-12.5 MG tablet Take 1 tablet by mouth daily 90 tablet 3     metFORMIN (GLUCOPHAGE XR) 500 MG 24 hr tablet Take 4 tablets (2,000 mg) by mouth daily 360 tablet 3     pravastatin (PRAVACHOL) 40 MG tablet Take 1 tablet (40 mg) by mouth daily 90 tablet 3       ALLERGIES:  No Known Allergies    PAST MEDICAL HISTORY:   Past Medical History:   Diagnosis Date     Unspecified essential hypertension     Essential hypertension        FAMILY HISTORY:    Family History   Problem Relation Age of Onset     Hypertension Father      Lung Cancer Father      Diabetes Type 2  Father      Diabetes No family hx of      Eye Disorder No family hx of        REVIEW OF SYSTEMS:  12 point ROS was negative other than the symptoms noted above in the HPI.     PET CT: 12/29/2023  In this patient with squamous cell carcinoma status post resection,  1. Please refer to dedicated report for findings seen at the neck  region.  2. A left apical 1.1 cm subpleural nodule with mild FDG uptake.  Left  upper hilar hypermetabolic lymph node.   Differential includes adenocarcinoma, benign etiologies, and less  likely metastasis.  If prior CT imaging is available to  determine if  lesion is stable. Alternatively short-term follow-up versus tissue  sampling.     3. No other suspicious lesions in the chest, abdomen, and pelvis.    4. On the fusion PET CT, there is increased tracer uptake and soft  tissue density at the junction of the right maxillary sinus and  ethmoid air cells, concerning for residual tumor. Primary NI-RADS 2a.  Recommend correlation with direct visualization.  5. Neck: NI-RADS 1}  No abnormal lymph node. Mild FDG uptake in the  left level IIb lymph node is most likely reactive.    MRI 01/09/2024   4.1 x 1.2 cm mass in the right maxillary sinus roof and  adjacent medial ethmoid air cells consistent with the provided history  of squamous cell carcinoma arising from an inverted papilloma.      IMPRESSION AND PLAN: 68-year-old male with right-sided sinonasal squamous cell carcinoma arising in from inverted papilloma.  We reviewed PET/CT and MRI.  Fortunately there is no any metastatic disease.  My concern today is that the MRI does show some orbital invasion.  I am going to review his images at the head and neck tumor board.  I discussed with him the treatment for this is going to be surgery followed by radiation therapy.  So we are going to schedule the surgery in the next few weeks.  I did I answered all his questions.      Karina Kumar MD, M.D.  Otolaryngology- Head & Neck Surgery  [phone]

## 2024-01-11 ENCOUNTER — OFFICE VISIT (OUTPATIENT)
Dept: OTOLARYNGOLOGY | Facility: CLINIC | Age: 69
End: 2024-01-11
Payer: COMMERCIAL

## 2024-01-11 ENCOUNTER — MYC MEDICAL ADVICE (OUTPATIENT)
Dept: OTOLARYNGOLOGY | Facility: CLINIC | Age: 69
End: 2024-01-11

## 2024-01-11 DIAGNOSIS — C31.9: Primary | ICD-10-CM

## 2024-01-11 PROCEDURE — 99213 OFFICE O/P EST LOW 20 MIN: CPT | Performed by: OTOLARYNGOLOGY

## 2024-01-11 ASSESSMENT — PAIN SCALES - GENERAL: PAINLEVEL: MODERATE PAIN (5)

## 2024-01-11 NOTE — LETTER
1/11/2024       RE: Opal Benitez  [address] [facility] [address]     Dear Colleague,    Thank you for referring your patient, Opal Benitez, to the [facility] [place] at [facility]. Please see a copy of my visit note below.    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  Patient had right  transnasal endoscopic resection on October 26, 2023.  The patient is here to discuss the results of the sinus MRI as well as PET/CT.   Today he brought up that his right sided cheek region is numb.    MEDICATIONS:     Current Outpatient Medications   Medication Sig Dispense Refill    amoxicillin-clavulanate (AUGMENTIN) 875-125 MG tablet       dapagliflozin (FARXIGA) 10 MG TABS tablet Take 1 tablet (10 mg) by mouth daily 90 tablet 3    fish oil-omega-3 fatty acids 1000 MG capsule Take 2 g by mouth daily      lisinopril-hydrochlorothiazide (ZESTORETIC) 20-12.5 MG tablet Take 1 tablet by mouth daily 90 tablet 3    metFORMIN (GLUCOPHAGE XR) 500 MG 24 hr tablet Take 4 tablets (2,000 mg) by mouth daily 360 tablet 3    pravastatin (PRAVACHOL) 40 MG tablet Take 1 tablet (40 mg) by mouth daily 90 tablet 3       ALLERGIES:  No Known Allergies    PAST MEDICAL HISTORY:   Past Medical History:   Diagnosis Date    Unspecified essential hypertension     Essential hypertension        FAMILY HISTORY:    Family History   Problem Relation Age of Onset    Hypertension Father     Lung Cancer Father     Diabetes Type 2  Father     Diabetes No family hx of     Eye Disorder No family hx of        REVIEW OF SYSTEMS:  12 point ROS was negative other than the symptoms noted above in the HPI.     PET CT: 12/29/2023  In this patient with squamous cell carcinoma status post resection,  1.  Please refer to dedicated report for findings seen at the neck  region.  2. A left apical 1.1 cm subpleural nodule with mild FDG uptake.  Left  upper hilar hypermetabolic lymph node.   Differential includes adenocarcinoma, benign etiologies, and less  likely metastasis.  If prior CT imaging is available to determine if  lesion is stable. Alternatively short-term follow-up versus tissue  sampling.     3. No other suspicious lesions in the chest, abdomen, and pelvis.    4. On the fusion PET CT, there is increased tracer uptake and soft  tissue density at the junction of the right maxillary sinus and  ethmoid air cells, concerning for residual tumor. Primary NI-RADS 2a.  Recommend correlation with direct visualization.  5. Neck: NI-RADS 1}  No abnormal lymph node. Mild FDG uptake in the  left level IIb lymph node is most likely reactive.    MRI 01/09/2024   4.1 x 1.2 cm mass in the right maxillary sinus roof and  adjacent medial ethmoid air cells consistent with the provided history  of squamous cell carcinoma arising from an inverted papilloma.      IMPRESSION AND PLAN: 68-year-old male with right-sided sinonasal squamous cell carcinoma arising in from inverted papilloma.  We reviewed PET/CT and MRI.  Fortunately there is no any metastatic disease.  My concern today is that the MRI does show some orbital invasion.  I am going to review his images at the head and neck tumor board.  I discussed with him the treatment for this is going to be surgery followed by radiation therapy.  So we are going to schedule the surgery in the next few weeks.  I did I answered all his questions.      Karina Kumar MD, M.D.  Otolaryngology- Head & Neck Surgery  [phone]

## 2024-01-16 ENCOUNTER — PREP FOR PROCEDURE (OUTPATIENT)
Dept: OTOLARYNGOLOGY | Facility: CLINIC | Age: 69
End: 2024-01-16
Payer: COMMERCIAL

## 2024-01-16 DIAGNOSIS — C31.9 MALIGNANT NEOPLASM OF ACCESSORY SINUS (H): Primary | ICD-10-CM

## 2024-01-16 RX ORDER — CEFTRIAXONE 2 G/1
2 INJECTION, POWDER, FOR SOLUTION INTRAMUSCULAR; INTRAVENOUS
Status: CANCELLED | OUTPATIENT
Start: 2024-01-16

## 2024-01-16 RX ORDER — DEXAMETHASONE SODIUM PHOSPHATE 4 MG/ML
10 INJECTION, SOLUTION INTRA-ARTICULAR; INTRALESIONAL; INTRAMUSCULAR; INTRAVENOUS; SOFT TISSUE ONCE
Status: CANCELLED | OUTPATIENT
Start: 2024-01-16 | End: 2024-01-16

## 2024-01-17 ENCOUNTER — TELEPHONE (OUTPATIENT)
Dept: OTOLARYNGOLOGY | Facility: CLINIC | Age: 69
End: 2024-01-17
Payer: COMMERCIAL

## 2024-01-17 NOTE — TELEPHONE ENCOUNTER
Patient is scheduled for surgery with Dr. Cole.     Spoke with: Patient     Date of Surgery: 1/26/2024    Location: [facility] OR     Pre op with Provider: KRYSTIAN    H&P: Patient will schedule pre op with Aiden Drummond. Asked that patient give our office a call back at [phone] should he be unable to schedule with PCP and wish to be seen by PAC.     Additional imaging/appointments: Patient needs a 1 week post op with Dr. Cole. No appointments are available at this time. Please advise best spot to schedule patient.     Surgery packet: Per patients preference, will send packet through [employer]. Patient made aware arrival time will not be listed within packet.      Additional comments: Informed patient a pre op nurse will call 2-5 days prior to surgery to go over further details/give arrival time.     Patient asked for a call back to confirm if being seen by a radiologist prior to surgery is still preferred.         Chantel Apodaca on 1/17/2024 at 9:43 AM

## 2024-01-18 ENCOUNTER — PATIENT OUTREACH (OUTPATIENT)
Dept: ONCOLOGY | Facility: CLINIC | Age: 69
End: 2024-01-18
Payer: COMMERCIAL

## 2024-01-18 NOTE — PROGRESS NOTES
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    Clinical History (per Nurse review of records provided):      Right sinonasal squamous cell carcinoma from inverted papilloma. See by ENT on 1/11/23. Patient had right  transnasal endoscopic resection on October 26, 2023.     PET CT: 12/29/2023  In this patient with squamous cell carcinoma status post resection,  1. Please refer to dedicated report for findings seen at the neck region.  2. A left apical 1.1 cm subpleural nodule with mild FDG uptake.  Left  upper hilar hypermetabolic lymph node. Differential includes adenocarcinoma, benign etiologies, and less likely metastasis.  If prior CT imaging is available to determine if lesion is stable. Alternatively short-term follow-up versus tissue sampling.  3. No other suspicious lesions in the chest, abdomen, and pelvis.  4. On the fusion PET CT, there is increased tracer uptake and soft  tissue density at the junction of the right maxillary sinus and ethmoid air cells, concerning for residual tumor. Primary NI-RADS 2a. Recommend correlation with direct visualization.  5. Neck: NI-RADS 1}  No abnormal lymph node. Mild FDG uptake in the  left level IIb lymph node is most likely reactive.     MRI 01/09/2024  4.1 x 1.2 cm mass in the right maxillary sinus roof and adjacent medial ethmoid air cells consistent with the provided history of squamous cell carcinoma arising from an inverted papilloma.    IMPRESSION AND PLAN:  68-year-old male with right-sided sinonasal squamous cell carcinoma arising in from inverted papilloma.  We reviewed PET/CT and MRI.  Fortunately there is no any metastatic disease.  My concern today is that the MRI does show some orbital invasion.  I am going to review his images at the head and neck tumor board.  I discussed with him the treatment for this is going to be surgery followed by radiation therapy.  So we are going to schedule the surgery in the next few weeks.  I did I answered all his questions.      Records Location: See Bookmarked material     Records Needed: NA     Additional testing needed prior to consult: NA    Payor: Mobile Card / Plan: Mobile Card OPEN ACCESS / Product Type: HMO /     January 18, 2024    Called patient to introduced myself and role as nurse navigator with [facility] Hematology/Oncology department and to inform them that we have received the referral for a diagnosis of  from Dr. Karina Kumar.     Patient did not answer the phone so a detailed message was left for them including NPS number. Requested callback to speak to a  to set the consult date/time/location. Explained to patient in the message that they will receive a call from our new patient scheduling team (NPS number below, hours are Monday - Friday 8am - 4:30 pm) in the next 1-2 business days to schedule the consultation. Encouraged them to call back with any questions.       Audrey Kim, RN, BSN  [facility] Hematology/Oncology Nurse Navigator  [phone]

## 2024-01-19 ENCOUNTER — TUMOR CONFERENCE (OUTPATIENT)
Dept: ONCOLOGY | Facility: CLINIC | Age: 69
End: 2024-01-19
Payer: COMMERCIAL

## 2024-01-19 DIAGNOSIS — C30.0 SQUAMOUS CELL CARCINOMA OF NASAL CAVITY (H): ICD-10-CM

## 2024-01-19 DIAGNOSIS — C30.0 SQUAMOUS CELL CARCINOMA OF NASAL CAVITY (H): Primary | ICD-10-CM

## 2024-01-19 NOTE — TUMOR CONFERENCE
Head & Neck Tumor Conference Note   1/19/2024    Status: New  Staff: Dr. 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  Patient had right  transnasal endoscopic resection on October 26, 2023. He had a recent sinus MRI and PET/CT to stage. We will discuss the results today.     Pertinent PMH:   Past Medical History:   Diagnosis Date    Unspecified essential hypertension     Essential hypertension        Smoking Hx:   Social History     Tobacco Use    Smoking status: Never     Passive exposure: Never    Smokeless tobacco: Never    Tobacco comments:     states he never has smokes   Vaping Use    Vaping Use: Never used   Substance Use Topics    Alcohol use: Yes     Alcohol/week: 6.0 standard drinks of alcohol     Types: 6 Cans of beer per week     Comment: occasional beer    Drug use: Never       Imaging:   MRI 1/9/24  IMPRESSION: 4.1 x 1.2 cm mass in the right maxillary sinus roof and  adjacent medial ethmoid air cells consistent with the provided history  of squamous cell carcinoma arising from an inverted papilloma.    12/29/23 PET/CT  IMPRESSION:   In this patient with squamous cell carcinoma status post resection,  1. Please refer to dedicated report for findings seen at the neck  region.  2. A left apical 1.1 cm subpleural nodule with mild FDG uptake.  Left  upper hilar hypermetabolic lymph node.   Differential includes adenocarcinoma, benign etiologies, and less  likely metastasis.  If prior CT imaging is available to determine if  lesion is stable. Alternatively short-term follow-up versus tissue  sampling.    Pathology:   CASE FROM [place], [place] ([id number], OBTAINED 10/26/2023):  A. RIGHT SINUS CONTENTS, CURETTAGE:  - INVASIVE SQUAMOUS CELL CARCINOMA IN A BACKGROUND OF  INVERTED SQUAMOUS PAPILLOMA  - See comment    Tumor Board Recommendation:   Discussion:   There's inflammation in the maxillary sinus on imaging. Intermediate signal on T2 in the roof of the maxillary sinus and part of the ethmoid air cells. It appears to be recurrent SCC. The infraorbital nerve is probably involved. The mass is pushing towards the eye. The orbital fat is normal without invasion, but the floor of the orbit is involved. The meckel's cave appears okay as well as V2.     The subpleural node could be something like a low grade adenocarcinoma. It is not super hot and there is another tiny one in the LLL. Does not appear to be a met, however it should be sampled.     Plan:   - IR bx of subpleural node    Malu Stevens MD  Otolaryngology-Head & Neck Surgery PGY3    Documentation / Disclaimer Cancer Tumor Board Note  Cancer tumor board recommendations do not override what is determined to be reasonable care and treatment, which is dependent on the circumstances of a patient's case; the patient's medical, social, and personal concerns; and the clinical judgment of the oncologist [physician].

## 2024-01-19 NOTE — CONSULTS
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 IR has been asked to  biopsy 1.1 cm x 0.8 cm solid pulmonary nodule in the left upper lobe for concerns of metastasis.     Reviewed at tumor board 1/19/24  Tumor Board Recommendation:      Discussion:   There's inflammation in the maxillary sinus on imaging. Intermediate signal on T2 in the roof of the maxillary sinus and part of the ethmoid air cells. It appears to be recurrent SCC. The infraorbital nerve is probably involved. The mass is pushing towards the eye. The orbital fat is normal without invasion, but the floor of the orbit is involved. The meckel's cave appears okay as well as V2.      The subpleural node could be something like a low grade adenocarcinoma. It is not super hot and there is another tiny one in the LLL. Does not appear to be a met, however it should be sampled.      Plan:   - IR bx of subpleural node    Dr. Newberry notes :Opal Benitez [phone]   patient with hx of sinonasal SCC (within a previous inverted papilloma), now with residual or recurrent local sinus mass. On whole body PET, there is also a left apical subpleural nodule with mild uptake. Biopsy requested. could be a primary adeno (low level uptake), less likely met. 12/129/24 PET CT se 7, image 26                CT 12/29/23 Lungs: The central tracheobronchial tree is clear. No acute  consolidation. No pleural effusion or pneumothorax. 1.1 cm x 0.8 cm  solid pulmonary nodule in the left upper lobe (image 25, series 7) max  SUV 2. Additional 5 mm solitary pulmonary nodule in the left lower  lobe on image 71, series 7, below the size threshold for  characterization by PET.     IMPRESSION:   In this patient with squamous cell carcinoma status post resection,  1. Please refer to dedicated report for findings  seen at the neck  region.  2. A left apical 1.1 cm subpleural nodule with mild FDG uptake. Left  upper hilar hypermetabolic lymph node.   Differential includes adenocarcinoma, benign etiologies, and less  likely metastasis. If prior CT imaging is available to determine if  lesion is stable. Alternatively short-term follow-up versus tissue  sampling.    3. No other suspicious lesions in the chest, abdomen, and pelvis      Case and imaging CT 12/29/23 was reviewed with Dr. Colón from IR who recommends patient be reviewed at Pulmonary Nodule conference. The lesion is small, close to the internal mammary, aorta as a back stop. IR requests review for best practice approach to biopsy, monitor this lesion.   Message sent to IR SUSAN Tolliver and Pul nodule team.     Primary team Dr. Douglas Suarez ENT made aware of IR recommendations via epic messaging.    LILIAN Fitzpatrick CNP  Interventional Radiology   IR on-call pager: [phone]      ADDENDUM: 1/26/24 0928    Following discussion at pulmonary nodule conference, it was recommended that patient be assessed by Dr. Singer of Thoracic Surgery. A message will be sent to Dr. Douglas Suarez, and referral will be placed.   Saira Tolliver PA-C

## 2024-01-20 PROBLEM — C31.0 SQUAMOUS CELL CARCINOMA OF MAXILLARY SINUS (H): Status: ACTIVE | Noted: 2024-01-20

## 2024-01-20 PROBLEM — C31.9: Status: ACTIVE | Noted: 2024-01-20

## 2024-01-20 NOTE — ASSESSMENT & PLAN NOTE
-Path: INVASIVE SQUAMOUS CELL CARCINOMA IN A BACKGROUND OF INVERTED SQUAMOUS PAPILLOMA   -Per Tumor conference  Tumor Site: Right sinus  Tumor Pathology: SCC  Tumor Stage: likely T3N0

## 2024-01-22 ENCOUNTER — OFFICE VISIT (OUTPATIENT)
Dept: FAMILY MEDICINE | Facility: CLINIC | Age: 69
End: 2024-01-22
Payer: COMMERCIAL

## 2024-01-22 ENCOUNTER — MYC MEDICAL ADVICE (OUTPATIENT)
Dept: OTOLARYNGOLOGY | Facility: CLINIC | Age: 69
End: 2024-01-22

## 2024-01-22 VITALS
HEART RATE: 80 BPM | RESPIRATION RATE: 20 BRPM | BODY MASS INDEX: 26.21 KG/M2 | TEMPERATURE: 96.8 F | OXYGEN SATURATION: 98 % | SYSTOLIC BLOOD PRESSURE: 118 MMHG | WEIGHT: 167 LBS | DIASTOLIC BLOOD PRESSURE: 72 MMHG | HEIGHT: 67 IN

## 2024-01-22 DIAGNOSIS — C31.9 MALIGNANT NEOPLASM OF PARANASAL SINUS (H): Primary | ICD-10-CM

## 2024-01-22 DIAGNOSIS — I10 PRIMARY HYPERTENSION: ICD-10-CM

## 2024-01-22 DIAGNOSIS — E11.9 TYPE 2 DIABETES MELLITUS WITHOUT COMPLICATION, WITHOUT LONG-TERM CURRENT USE OF INSULIN (H): ICD-10-CM

## 2024-01-22 DIAGNOSIS — R91.1 NODULE OF APEX OF LEFT LUNG: ICD-10-CM

## 2024-01-22 DIAGNOSIS — E78.5 DYSLIPIDEMIA: ICD-10-CM

## 2024-01-22 LAB
ANION GAP SERPL CALCULATED.3IONS-SCNC: 10 MMOL/L (ref 7–15)
BUN SERPL-MCNC: 22.9 MG/DL (ref 8–23)
CALCIUM SERPL-MCNC: 9.4 MG/DL (ref 8.8–10.2)
CHLORIDE SERPL-SCNC: 102 MMOL/L (ref 98–107)
CREAT SERPL-MCNC: 0.71 MG/DL (ref 0.67–1.17)
DEPRECATED HCO3 PLAS-SCNC: 26 MMOL/L (ref 22–29)
EGFRCR SERPLBLD CKD-EPI 2021: >90 ML/MIN/1.73M2
ERYTHROCYTE [DISTWIDTH] IN BLOOD BY AUTOMATED COUNT: 12.1 % (ref 10–15)
GLUCOSE SERPL-MCNC: 137 MG/DL (ref 70–99)
HBA1C MFR BLD: 7.2 % (ref 0–5.6)
HCT VFR BLD AUTO: 45.8 % (ref 40–53)
HGB BLD-MCNC: 15 G/DL (ref 13.3–17.7)
MCH RBC QN AUTO: 31.4 PG (ref 26.5–33)
MCHC RBC AUTO-ENTMCNC: 32.8 G/DL (ref 31.5–36.5)
MCV RBC AUTO: 96 FL (ref 78–100)
PLATELET # BLD AUTO: 267 10E3/UL (ref 150–450)
POTASSIUM SERPL-SCNC: 4.5 MMOL/L (ref 3.4–5.3)
RBC # BLD AUTO: 4.78 10E6/UL (ref 4.4–5.9)
SODIUM SERPL-SCNC: 138 MMOL/L (ref 135–145)
WBC # BLD AUTO: 7.4 10E3/UL (ref 4–11)

## 2024-01-22 PROCEDURE — 83036 HEMOGLOBIN GLYCOSYLATED A1C: CPT | Performed by: INTERNAL MEDICINE

## 2024-01-22 PROCEDURE — 99214 OFFICE O/P EST MOD 30 MIN: CPT | Performed by: INTERNAL MEDICINE

## 2024-01-22 PROCEDURE — 80048 BASIC METABOLIC PNL TOTAL CA: CPT | Performed by: INTERNAL MEDICINE

## 2024-01-22 PROCEDURE — 85027 COMPLETE CBC AUTOMATED: CPT | Performed by: INTERNAL MEDICINE

## 2024-01-22 PROCEDURE — 36415 COLL VENOUS BLD VENIPUNCTURE: CPT | Performed by: INTERNAL MEDICINE

## 2024-01-22 ASSESSMENT — ENCOUNTER SYMPTOMS
SHORTNESS OF BREATH: 0
SINUS PRESSURE: 1
SORE THROAT: 0
DIARRHEA: 0
UNEXPECTED WEIGHT CHANGE: 0
RHINORRHEA: 0
ABDOMINAL PAIN: 0
TROUBLE SWALLOWING: 0
CHILLS: 0
BRUISES/BLEEDS EASILY: 0
VOMITING: 0
SLEEP DISTURBANCE: 0
PALPITATIONS: 0
MYALGIAS: 0
HEADACHES: 0
FATIGUE: 0
FEVER: 0
COUGH: 0
DYSURIA: 0
SPEECH DIFFICULTY: 0
WEAKNESS: 0
DIFFICULTY URINATING: 0

## 2024-01-22 NOTE — PROGRESS NOTES
Preoperative Evaluation  [facility]  [address] [address]  Phone: [phone]  Fax: [phone]  Primary Provider: Janna Drummond  Pre-op Performing Provider: JANNA DRUMMOND  Jan 22, 2024       Opal is a 68 year old, presenting for the following:  Pre-Op Exam (For stealth guided transnasal approach to resect sinonasal tumor with [facility] [place] [facility]        1/22/2024     8:37 AM   Additional Questions   Roomed by Yannick LEZAMA     Surgical Information  Surgery/Procedure: Stealth Guided transnasal approach to resect sinonasl tumor with coldwll YESSENIA approach  Surgery Location: [facility], [place]  Surgeon: Dr Kumar  Surgery Date: 1/26/24  Time of Surgery: TBD  Where patient plans to recover: At home with family  Fax number for surgical facility: Note does not need to be faxed, will be available electronically in Epic.    Assessment & Plan     The proposed surgical procedure is considered INTERMEDIATE risk.    Problem List Items Addressed This Visit       Primary hypertension     Controlled with lisinopril 10/HCTZ 12.5 daily         Relevant Orders    Basic metabolic panel  (Ca, Cl, CO2, Creat, Gluc, K, Na, BUN)    Type 2 diabetes mellitus without complication, without long-term current use of insulin (H)     On metformin XR 2000 mg daily and dapagliflozin 10 mg daily   On statin and ACE  diabetes is improving/stable/worsening: unchanged.  Medication changes per orders.  Diabetes will be reassessed in 6 months.         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    Additional Medication Instructions   - ACE/ARB: HOLD on day of surgery (minimum 11 hours for general anesthesia).   - metformin: HOLD day of surgery.   - SGLT2 Inhibitor (canagliflozin, dapagliflozin, or empagliflozin): HOLD 3 days before surgery.     Recommendation  APPROVAL GIVEN to proceed with proposed procedure, without further diagnostic evaluation.          Subjective       HPI related to upcoming procedure: Patient scheduled for surgery due to squamous Carcinoma right ethmoid and maxillary sinuses.  Initially presented with nasal obstructive symptoms and polyp.  No history of coronary disease, congestive heart failure, angina, dyspnea, or sleep apnea.  Controlled diabetes.  No history of surgical bleeding or anesthetic problems.        1/22/2024     8:19 AM   Preop Questions   1. Have you ever had a heart attack or stroke? No   2. Have you ever had surgery on your heart or blood vessels, such as a stent placement, a coronary artery bypass, or surgery on an artery in your head, neck, heart, or legs? No   3. Do you have chest pain with activity? No   4. Do you have a history of  heart failure? No   5. Do you currently have a cold, bronchitis or symptoms of other infection? No   6. Do you have a cough, shortness of breath, or wheezing? No   7. Do you or anyone in your family have previous history of blood clots? No   8. Do you or does anyone in your family have a serious bleeding problem such as prolonged bleeding following surgeries or cuts? No   9. Have you ever had problems with anemia or been told to take iron pills? No   10. Have you had  any abnormal blood loss such as black, tarry or bloody stools? No   11. Have you ever had a blood transfusion? No   12. Are you willing to have a blood transfusion if it is medically needed before, during, or after your surgery? NO - Would if necessary.   13. Have you or any of your relatives ever had problems with anesthesia? No   14. Do you have sleep apnea, excessive snoring or daytime drowsiness? No   15. Do you have any artifical heart valves or other implanted medical devices like a pacemaker, defibrillator, or continuous glucose monitor? No   16. Do you have artificial joints? No   17. Are you allergic to latex? No       Health Care Directive  Patient does not have a Health Care Directive or Living Will: Discussed advance care planning with patient; information given to patient to review.    Preoperative Review of    reviewed - no record of controlled substances prescribed.          Patient Active Problem List    Diagnosis Date Noted    Nodule of apex of left lung 01/22/2024     Priority: Medium    Malignant neoplasm of paranasal sinus (H) 01/20/2024     Priority: Medium    Dyslipidemia 03/07/2022     Priority: Medium    Hearing loss 03/07/2022     Priority: Medium    Microscopic hematuria 03/07/2022     Priority: Medium    Type 2 diabetes mellitus without complication, without long-term current use of insulin (H) 01/01/2007     Priority: Medium    Primary hypertension 01/01/2000     Priority: Medium      Past Medical History:   Diagnosis Date    Unspecified essential hypertension     Essential hypertension     Past Surgical History:   Procedure Laterality Date    COLOGUARD(EXACT SCIENCES)  12/10/2018    COLOGUAIVANA(EXACT SCIENCES)  11/18/2015    CYSTOSCOPY  2000     Current Outpatient Medications   Medication Sig Dispense Refill    dapagliflozin (FARXIGA) 10 MG TABS tablet Take 1 tablet (10 mg) by mouth daily 90 tablet 3    fish oil-omega-3 fatty acids 1000 MG capsule Take 2 g by mouth daily       "lisinopril-hydrochlorothiazide (ZESTORETIC) 20-12.5 MG tablet Take 1 tablet by mouth daily 90 tablet 3    metFORMIN (GLUCOPHAGE XR) 500 MG 24 hr tablet Take 4 tablets (2,000 mg) by mouth daily 360 tablet 3    pravastatin (PRAVACHOL) 40 MG tablet Take 1 tablet (40 mg) by mouth daily 90 tablet 3       No Known Allergies     Social History     Tobacco Use    Smoking status: Never     Passive exposure: Never    Smokeless tobacco: Never    Tobacco comments:     states he never has smokes   Substance Use Topics    Alcohol use: Yes     Alcohol/week: 6.0 standard drinks of alcohol     Types: 6 Cans of beer per week     Comment: occasional beer     Family History   Problem Relation Age of Onset    Hypertension Father     Lung Cancer Father     Diabetes Type 2  Father     Diabetes No family hx of     Eye Disorder No family hx of      History   Drug Use Unknown         Review of Systems   Constitutional:  Negative for chills, fatigue, fever and unexpected weight change.   HENT:  Positive for sinus pressure. Negative for congestion, postnasal drip, rhinorrhea, sore throat and trouble swallowing.    Eyes:  Negative for visual disturbance.   Respiratory:  Negative for cough and shortness of breath.    Cardiovascular:  Negative for chest pain, palpitations and peripheral edema.   Gastrointestinal:  Negative for abdominal pain, diarrhea and vomiting.   Endocrine: Negative for polyuria.   Genitourinary:  Negative for difficulty urinating and dysuria.        Nocturia 0-1   Musculoskeletal:  Negative for myalgias.   Skin:  Negative for rash.   Neurological:  Negative for speech difficulty, weakness and headaches.   Hematological:  Does not bruise/bleed easily.   Psychiatric/Behavioral:  Negative for mood changes and sleep disturbance.        Objective    /72 (BP Location: Right arm, Patient Position: Sitting, Cuff Size: Adult Large)   Pulse 80   Temp 96.8  F (36  C) (Tympanic)   Resp 20   Ht 1.702 m (5' 7\")   Wt 75.8 kg " "(167 lb)   SpO2 98%   BMI 26.16 kg/m     Estimated body mass index is 26.16 kg/m  as calculated from the following:    Height as of this encounter: 1.702 m (5' 7\").    Weight as of this encounter: 75.8 kg (167 lb).  Physical Exam  Healthy-appearing man in no distress  Eyes appear normal  HEENT exam reveals clear nasal passages and oropharynx.  Mallampati 1  Neck supple no neck or thyromegaly  Lungs clear  Cardiac exam regular no murmur or edema.  Normal carotid and posterior tibial pulsations  Abdomen soft and nontender  Alert, oriented, speech fluent, memory intact, cranial nerves normal, strength and gait normal  Normal mood and affect    Recent Labs   Lab Test 10/09/23  1208 03/31/23  0814   HGB 14.8  --      --     139   POTASSIUM 4.2 5.0   CR 0.63* 0.75   A1C 7.7* 7.7*        Diagnostics  Labs pending at this time.  Results will be reviewed when available.   EKG: From 10/9/2023 sinus rhythm at a rate of 78, LVH, otherwise normal    Revised Cardiac Risk Index (RCRI)  The patient has the following serious cardiovascular risks for perioperative complications:   - No serious cardiac risks = 0 points     RCRI Interpretation: 0 points: Class I (very low risk - 0.4% complication rate)         Signed Electronically by: Aiden Drummond MD  Copy of this evaluation report is provided to requesting physician.           "

## 2024-01-22 NOTE — PATIENT INSTRUCTIONS
Do not take Metformin or Lisinopril/hydrochlorothiazide on the morning of surgery.  Stop Farxiga after dose today for surgery.

## 2024-01-24 ENCOUNTER — PATIENT OUTREACH (OUTPATIENT)
Dept: ONCOLOGY | Facility: CLINIC | Age: 69
End: 2024-01-24
Payer: COMMERCIAL

## 2024-01-24 NOTE — PROGRESS NOTES
New IP (Interventional Pulmonology) referral rec'd.  Chart reviewed.       New Patient: Interventional Pulmonary (Lung nodule) Nurse Navigator Note    Referring provider:   Aiden Drummond MD [facility] [phone]       Referred to (specialty): Interventional Pulmonary (Lung nodule)    Requested provider (if applicable): n/a    Date Referral Received: 1/22/2024    Evaluation for :  Lung nodule  1/19/2024:  ENT tumor board discussion:   The subpleural node could be something like a low grade adenocarcinoma. It is not super hot and there is another tiny one in the LLL. Does not appear to be a met, however it should be sampled.      Plan:   - IR bx of subpleural node    Update from IR:  Patient is to be discussed at the lung tumor conference on FRI 1/26/2024.    Clinical History (per Nurse review of records provided):    **BOOK MARKED**    Combined Report of: PET and CT on 12/29/2023 3:14 PM:     1. PET of the neck, chest, abdomen, and pelvis.  2. PET CT Fusion for Attenuation Correction and Anatomical  Localization.  3. Diagnostic CT of the chest, abdomen and pelvis with intravenous  contrast obtained for diagnostic interpretation.  4. 3D MIP and PET-CT fused images were processed on an independent  workstation and archived to PACS and reviewed by a radiologist.     Technique:     1. PET: The patient received 10.33 mCi of F-18-FDG. The serum glucose  was 110 mg/dL prior to administration. Body weight was 78 kg. Images  were evaluated in the axial, sagittal, and coronal planes as well as  the rotational whole body MIP. Images were acquired from at least the  eyes to the proximal thighs.     UPTAKE WAS MEASURED AT 60 MINUTES.      BACKGROUND: Liver SUV max = 3.33, Aorta Blood SUV max = 2.32.      2. CT: Volumetric acquisition for clinical interpretation of the  chest, abdomen, and pelvis acquired at 3 mm sections. The chest,  abdomen, and pelvis were evaluated at 5 mm  sections in bone, soft  tissue, and lung windows.     Contrast and Medications:  IV contrast: 105 mL of Isovue 370 intravenously.  PO contrast: 500 mL oral water.  Additional Medications: None.     3. 3D MIP and PET-CT fused images were processed on an independent  workstation and archived to PACS and reviewed by a radiologist.     INDICATION: Malignant neoplasm of accessory sinus, unspecified (H).     ADDITIONAL INFORMATION OBTAINED FROM EMR: 68-year-old male with  right-sided sinonasal squamous cell carcinoma, 8 weeks after right  maxillary antrostomy, right ethmoidectomy, right sphenoidotomy and  mass resection. Restaging study.     COMPARISON: None available     FOLLOW-UP APPOINTMENT: 1/11/2024     FINDINGS:      HEAD/NECK:     Please refer to dedicated report for findings in the head/neck region.        CHEST:     Lymph nodes: Mildly FDG avid left hilar lymph node (SUV max 4.23).     Lungs: The central tracheobronchial tree is clear. No acute  consolidation.  No pleural effusion or pneumothorax. 1.1 cm x 0.8 cm  solid pulmonary nodule in the left upper lobe (image 25, series 7) max  SUV 2. Additional 5 mm solitary pulmonary nodule in the left lower  lobe on image 71, series 7, below the size threshold for  characterization by PET.      Heart and great vessels: Heart size is within normal limits. No  pericardial effusion. The thoracic aorta and main pulmonary artery are  within normal limits. The esophagus is unremarkable.      ABDOMEN AND PELVIS:     Liver: No FDG avid lesion. No intrahepatic or extrahepatic biliary  ductal dilatation. Gallbladder is within normal limits.      Pancreas: The pancreas is within normal limits. No pancreatic ductal  dilatation.      Spleen: No FDG avid lesion.     Adrenal glands: No FDG avid foci.     Kidneys: No FDG avid lesion. No hydronephrosis. The urinary bladder is  unremarkable.      Reproductive organs are within normal limits.     Gastrointestinal system: Normal caliber of  the small and large bowel.      Lymph nodes: No FDG avid or abnormally enlarged lymph nodes.     Vascular structures: Normal caliber of the abdominal aorta.     No free air, free fluid, or fluid collection.      EXTREMITIES:      No abnormal FDG uptake in the visualized extremities.     BONES AND SOFT TISSUES:      No abnormal FDG uptake in the skeleton. No abnormal lytic or blastic  osseous lesions.      SPINAL CANAL:      No evident canal compromise. No abnormal FDG avid lesion.                                                                      IMPRESSION:   In this patient with squamous cell carcinoma status post resection,  1. Please refer to dedicated report for findings seen at the neck  region.  2. A left apical 1.1 cm subpleural nodule with mild FDG uptake.  Left  upper hilar hypermetabolic lymph node.   Differential includes adenocarcinoma, benign etiologies, and less  likely metastasis.  If prior CT imaging is available to determine if  lesion is stable. Alternatively short-term follow-up versus tissue  sampling.     3. No other suspicious lesions in the chest, abdomen, and pelvis.     Records Location: [facility]     Records Needed: none    Additional testing needed prior to consult: TBD

## 2024-01-24 NOTE — TELEPHONE ENCOUNTER
MEDICAL RECORDS REQUEST   Radiation Oncology  [address] [place] [address]  Fax: [phone]          FUTURE VISIT INFORMATION                                                   Arielness Emmanuel, : 1955 scheduled for future visit at [facility] Radiation Oncology    RECORDS REQUESTED FOR VISIT                                                     HEAD & NECK     OFFICE NOTE from ENT Epic/ External: [facility] 24: Dr. Karina Kumar  23: Dr. Shawn Hampton   OPERATIVE/BIOPSY REPORTS External: [facility]: resect sinonasal tumor   10/26/23: Right Sinus Curettage   MEDICATION LIST [facility]    LABS     PATHOLOGY REPORTS Report in Epic/CE- [facility] Path Consult:  23: [id number] ([id number])    Surg Path:  24: [id number]   10/26/23: [id number]    ANYTHING RELATED TO DIAGNOSIS Epic Most recent 24   IMAGING (NEED IMAGES & REPORT)     CT SCANS PACS 24: CT Facial Bones  23: CT Soft Tissue Neck  23: CT Chest Abd Pel   MRI PACS 24: MR Sinonasal Cavity   PET PACS 23: PET Eyes to Thighs

## 2024-01-26 ENCOUNTER — ANESTHESIA (OUTPATIENT)
Dept: SURGERY | Facility: CLINIC | Age: 69
End: 2024-01-26
Payer: COMMERCIAL

## 2024-01-26 ENCOUNTER — MYC MEDICAL ADVICE (OUTPATIENT)
Dept: ONCOLOGY | Facility: CLINIC | Age: 69
End: 2024-01-26

## 2024-01-26 ENCOUNTER — ANESTHESIA EVENT (OUTPATIENT)
Dept: SURGERY | Facility: CLINIC | Age: 69
End: 2024-01-26
Payer: COMMERCIAL

## 2024-01-26 ENCOUNTER — HOSPITAL ENCOUNTER (OUTPATIENT)
Facility: CLINIC | Age: 69
Discharge: HOME | End: 2024-01-26
Attending: OTOLARYNGOLOGY | Admitting: OTOLARYNGOLOGY
Payer: COMMERCIAL

## 2024-01-26 ENCOUNTER — HOSPITAL ENCOUNTER (OUTPATIENT)
Dept: CT IMAGING | Facility: CLINIC | Age: 69
Discharge: HOME | End: 2024-01-26
Attending: OTOLARYNGOLOGY | Admitting: OTOLARYNGOLOGY
Payer: COMMERCIAL

## 2024-01-26 ENCOUNTER — PATIENT OUTREACH (OUTPATIENT)
Dept: ONCOLOGY | Facility: CLINIC | Age: 69
End: 2024-01-26

## 2024-01-26 VITALS
RESPIRATION RATE: 18 BRPM | DIASTOLIC BLOOD PRESSURE: 87 MMHG | HEART RATE: 97 BPM | OXYGEN SATURATION: 96 % | WEIGHT: 165.34 LBS | SYSTOLIC BLOOD PRESSURE: 134 MMHG | HEIGHT: 67 IN | BODY MASS INDEX: 25.95 KG/M2 | TEMPERATURE: 99 F

## 2024-01-26 DIAGNOSIS — C31.9 MALIGNANT NEOPLASM OF ACCESSORY SINUS (H): ICD-10-CM

## 2024-01-26 DIAGNOSIS — R91.1 NODULE OF APEX OF LEFT LUNG: Primary | ICD-10-CM

## 2024-01-26 DIAGNOSIS — C31.9 MALIGNANT NEOPLASM OF PARANASAL SINUS (H): Primary | ICD-10-CM

## 2024-01-26 LAB
ABO/RH(D): NORMAL
ANTIBODY SCREEN: NEGATIVE
GLUCOSE BLDC GLUCOMTR-MCNC: 139 MG/DL (ref 70–99)
GLUCOSE BLDC GLUCOMTR-MCNC: 156 MG/DL (ref 70–99)
GLUCOSE BLDC GLUCOMTR-MCNC: 200 MG/DL (ref 70–99)
GLUCOSE BLDC GLUCOMTR-MCNC: 227 MG/DL (ref 70–99)
HGB BLD-MCNC: 12.7 G/DL (ref 13.3–17.7)
HOLD SPECIMEN: NORMAL
SPECIMEN EXPIRATION DATE: NORMAL

## 2024-01-26 PROCEDURE — 250N000013 HC RX MED GY IP 250 OP 250 PS 637: Performed by: OTOLARYNGOLOGY

## 2024-01-26 PROCEDURE — 88331 PATH CONSLTJ SURG 1 BLK 1SPC: CPT | Mod: TC | Performed by: OTOLARYNGOLOGY

## 2024-01-26 PROCEDURE — 710N000010 HC RECOVERY PHASE 1, LEVEL 2, PER MIN: Performed by: OTOLARYNGOLOGY

## 2024-01-26 PROCEDURE — 370N000017 HC ANESTHESIA TECHNICAL FEE, PER MIN: Performed by: OTOLARYNGOLOGY

## 2024-01-26 PROCEDURE — 250N000011 HC RX IP 250 OP 636: Performed by: ANESTHESIOLOGY

## 2024-01-26 PROCEDURE — 250N000009 HC RX 250: Performed by: ANESTHESIOLOGY

## 2024-01-26 PROCEDURE — 70486 CT MAXILLOFACIAL W/O DYE: CPT

## 2024-01-26 PROCEDURE — 88305 TISSUE EXAM BY PATHOLOGIST: CPT | Mod: 26 | Performed by: STUDENT IN AN ORGANIZED HEALTH CARE EDUCATION/TRAINING PROGRAM

## 2024-01-26 PROCEDURE — 999N000141 HC STATISTIC PRE-PROCEDURE NURSING ASSESSMENT: Performed by: OTOLARYNGOLOGY

## 2024-01-26 PROCEDURE — 88307 TISSUE EXAM BY PATHOLOGIST: CPT | Mod: 26 | Performed by: STUDENT IN AN ORGANIZED HEALTH CARE EDUCATION/TRAINING PROGRAM

## 2024-01-26 PROCEDURE — 88304 TISSUE EXAM BY PATHOLOGIST: CPT | Mod: 26 | Performed by: STUDENT IN AN ORGANIZED HEALTH CARE EDUCATION/TRAINING PROGRAM

## 2024-01-26 PROCEDURE — 36415 COLL VENOUS BLD VENIPUNCTURE: CPT | Performed by: STUDENT IN AN ORGANIZED HEALTH CARE EDUCATION/TRAINING PROGRAM

## 2024-01-26 PROCEDURE — 88331 PATH CONSLTJ SURG 1 BLK 1SPC: CPT | Mod: 26 | Performed by: STUDENT IN AN ORGANIZED HEALTH CARE EDUCATION/TRAINING PROGRAM

## 2024-01-26 PROCEDURE — 88311 DECALCIFY TISSUE: CPT | Mod: 26 | Performed by: STUDENT IN AN ORGANIZED HEALTH CARE EDUCATION/TRAINING PROGRAM

## 2024-01-26 PROCEDURE — 85018 HEMOGLOBIN: CPT | Performed by: STUDENT IN AN ORGANIZED HEALTH CARE EDUCATION/TRAINING PROGRAM

## 2024-01-26 PROCEDURE — 710N000012 HC RECOVERY PHASE 2, PER MINUTE: Performed by: OTOLARYNGOLOGY

## 2024-01-26 PROCEDURE — 250N000025 HC SEVOFLURANE, PER MIN: Performed by: OTOLARYNGOLOGY

## 2024-01-26 PROCEDURE — 61782 SCAN PROC CRANIAL EXTRA: CPT | Mod: GC | Performed by: OTOLARYNGOLOGY

## 2024-01-26 PROCEDURE — 360N000079 HC SURGERY LEVEL 6, PER MIN: Performed by: OTOLARYNGOLOGY

## 2024-01-26 PROCEDURE — 250N000009 HC RX 250: Performed by: OTOLARYNGOLOGY

## 2024-01-26 PROCEDURE — 250N000011 HC RX IP 250 OP 636: Performed by: OTOLARYNGOLOGY

## 2024-01-26 PROCEDURE — 70486 CT MAXILLOFACIAL W/O DYE: CPT | Mod: 26 | Performed by: STUDENT IN AN ORGANIZED HEALTH CARE EDUCATION/TRAINING PROGRAM

## 2024-01-26 PROCEDURE — 258N000003 HC RX IP 258 OP 636: Performed by: ANESTHESIOLOGY

## 2024-01-26 PROCEDURE — 272N000001 HC OR GENERAL SUPPLY STERILE: Performed by: OTOLARYNGOLOGY

## 2024-01-26 PROCEDURE — 31257 NSL/SINS NDSC TOT W/SPHENDT: CPT | Mod: 51 | Performed by: OTOLARYNGOLOGY

## 2024-01-26 PROCEDURE — 82962 GLUCOSE BLOOD TEST: CPT

## 2024-01-26 PROCEDURE — 31032 EXPLORE SINUS REMOVE POLYPS: CPT | Mod: RT | Performed by: OTOLARYNGOLOGY

## 2024-01-26 PROCEDURE — 86900 BLOOD TYPING SEROLOGIC ABO: CPT | Performed by: OTOLARYNGOLOGY

## 2024-01-26 RX ORDER — SODIUM CHLORIDE, SODIUM LACTATE, POTASSIUM CHLORIDE, CALCIUM CHLORIDE 600; 310; 30; 20 MG/100ML; MG/100ML; MG/100ML; MG/100ML
INJECTION, SOLUTION INTRAVENOUS CONTINUOUS PRN
Status: DISCONTINUED | OUTPATIENT
Start: 2024-01-26 | End: 2024-01-26

## 2024-01-26 RX ORDER — HYDROMORPHONE HCL IN WATER/PF 6 MG/30 ML
0.4 PATIENT CONTROLLED ANALGESIA SYRINGE INTRAVENOUS EVERY 5 MIN PRN
Status: DISCONTINUED | OUTPATIENT
Start: 2024-01-26 | End: 2024-01-26 | Stop reason: HOSPADM

## 2024-01-26 RX ORDER — ONDANSETRON 2 MG/ML
4 INJECTION INTRAMUSCULAR; INTRAVENOUS EVERY 30 MIN PRN
Status: DISCONTINUED | OUTPATIENT
Start: 2024-01-26 | End: 2024-01-26 | Stop reason: HOSPADM

## 2024-01-26 RX ORDER — DEXAMETHASONE SODIUM PHOSPHATE 10 MG/ML
10 INJECTION, SOLUTION INTRAMUSCULAR; INTRAVENOUS ONCE
Status: COMPLETED | OUTPATIENT
Start: 2024-01-26 | End: 2024-01-26

## 2024-01-26 RX ORDER — SODIUM CHLORIDE, SODIUM LACTATE, POTASSIUM CHLORIDE, CALCIUM CHLORIDE 600; 310; 30; 20 MG/100ML; MG/100ML; MG/100ML; MG/100ML
INJECTION, SOLUTION INTRAVENOUS CONTINUOUS
Status: DISCONTINUED | OUTPATIENT
Start: 2024-01-26 | End: 2024-01-26 | Stop reason: HOSPADM

## 2024-01-26 RX ORDER — OXYCODONE HYDROCHLORIDE 10 MG/1
10 TABLET ORAL
Status: DISCONTINUED | OUTPATIENT
Start: 2024-01-26 | End: 2024-01-26 | Stop reason: HOSPADM

## 2024-01-26 RX ORDER — PROPOFOL 10 MG/ML
INJECTION, EMULSION INTRAVENOUS PRN
Status: DISCONTINUED | OUTPATIENT
Start: 2024-01-26 | End: 2024-01-26

## 2024-01-26 RX ORDER — FENTANYL CITRATE 50 UG/ML
25 INJECTION, SOLUTION INTRAMUSCULAR; INTRAVENOUS EVERY 5 MIN PRN
Status: DISCONTINUED | OUTPATIENT
Start: 2024-01-26 | End: 2024-01-26 | Stop reason: HOSPADM

## 2024-01-26 RX ORDER — CHLORHEXIDINE GLUCONATE ORAL RINSE 1.2 MG/ML
SOLUTION DENTAL PRN
Status: DISCONTINUED | OUTPATIENT
Start: 2024-01-26 | End: 2024-01-26 | Stop reason: HOSPADM

## 2024-01-26 RX ORDER — OXYMETAZOLINE HYDROCHLORIDE 0.05 G/100ML
SPRAY NASAL PRN
Status: DISCONTINUED | OUTPATIENT
Start: 2024-01-26 | End: 2024-01-26 | Stop reason: HOSPADM

## 2024-01-26 RX ORDER — FENTANYL CITRATE 50 UG/ML
50 INJECTION, SOLUTION INTRAMUSCULAR; INTRAVENOUS EVERY 5 MIN PRN
Status: DISCONTINUED | OUTPATIENT
Start: 2024-01-26 | End: 2024-01-26 | Stop reason: HOSPADM

## 2024-01-26 RX ORDER — ONDANSETRON 4 MG/1
4 TABLET, ORALLY DISINTEGRATING ORAL EVERY 30 MIN PRN
Status: DISCONTINUED | OUTPATIENT
Start: 2024-01-26 | End: 2024-01-26 | Stop reason: HOSPADM

## 2024-01-26 RX ORDER — HYDROMORPHONE HCL IN WATER/PF 6 MG/30 ML
0.2 PATIENT CONTROLLED ANALGESIA SYRINGE INTRAVENOUS EVERY 5 MIN PRN
Status: DISCONTINUED | OUTPATIENT
Start: 2024-01-26 | End: 2024-01-26 | Stop reason: HOSPADM

## 2024-01-26 RX ORDER — FENTANYL CITRATE 50 UG/ML
INJECTION, SOLUTION INTRAMUSCULAR; INTRAVENOUS PRN
Status: DISCONTINUED | OUTPATIENT
Start: 2024-01-26 | End: 2024-01-26

## 2024-01-26 RX ORDER — LIDOCAINE HYDROCHLORIDE 20 MG/ML
INJECTION, SOLUTION INFILTRATION; PERINEURAL PRN
Status: DISCONTINUED | OUTPATIENT
Start: 2024-01-26 | End: 2024-01-26

## 2024-01-26 RX ORDER — CHLORHEXIDINE GLUCONATE ORAL RINSE 1.2 MG/ML
5 SOLUTION DENTAL 2 TIMES DAILY
Qty: 118 ML | Refills: 0 | Status: SHIPPED | OUTPATIENT
Start: 2024-01-26 | End: 2024-02-05

## 2024-01-26 RX ORDER — OXYCODONE HYDROCHLORIDE 5 MG/1
5 TABLET ORAL
Status: DISCONTINUED | OUTPATIENT
Start: 2024-01-26 | End: 2024-01-26 | Stop reason: HOSPADM

## 2024-01-26 RX ORDER — PREDNISONE 20 MG/1
TABLET ORAL
Qty: 18 TABLET | Refills: 0 | Status: SHIPPED | OUTPATIENT
Start: 2024-01-26 | End: 2024-01-26

## 2024-01-26 RX ORDER — ECHINACEA PURPUREA EXTRACT 125 MG
TABLET ORAL
Qty: 88 ML | Refills: 3 | Status: SHIPPED | OUTPATIENT
Start: 2024-01-26 | End: 2024-08-12

## 2024-01-26 RX ORDER — ESMOLOL HYDROCHLORIDE 10 MG/ML
INJECTION INTRAVENOUS PRN
Status: DISCONTINUED | OUTPATIENT
Start: 2024-01-26 | End: 2024-01-26

## 2024-01-26 RX ORDER — ONDANSETRON 2 MG/ML
INJECTION INTRAMUSCULAR; INTRAVENOUS PRN
Status: DISCONTINUED | OUTPATIENT
Start: 2024-01-26 | End: 2024-01-26

## 2024-01-26 RX ORDER — CEFTRIAXONE 2 G/1
2 INJECTION, POWDER, FOR SOLUTION INTRAMUSCULAR; INTRAVENOUS
Status: COMPLETED | OUTPATIENT
Start: 2024-01-26 | End: 2024-01-26

## 2024-01-26 RX ORDER — OXYCODONE HYDROCHLORIDE 5 MG/1
5 TABLET ORAL EVERY 6 HOURS PRN
Qty: 12 TABLET | Refills: 0 | Status: SHIPPED | OUTPATIENT
Start: 2024-01-26 | End: 2024-01-29

## 2024-01-26 RX ORDER — LIDOCAINE HYDROCHLORIDE AND EPINEPHRINE 10; 10 MG/ML; UG/ML
INJECTION, SOLUTION INFILTRATION; PERINEURAL PRN
Status: DISCONTINUED | OUTPATIENT
Start: 2024-01-26 | End: 2024-01-26 | Stop reason: HOSPADM

## 2024-01-26 RX ORDER — OXYMETAZOLINE HYDROCHLORIDE 0.05 G/100ML
2 SPRAY NASAL 2 TIMES DAILY
Qty: 15 ML | Refills: 0 | Status: SHIPPED | OUTPATIENT
Start: 2024-01-26 | End: 2024-01-29

## 2024-01-26 RX ORDER — LABETALOL 20 MG/4 ML (5 MG/ML) INTRAVENOUS SYRINGE
PRN
Status: DISCONTINUED | OUTPATIENT
Start: 2024-01-26 | End: 2024-01-26

## 2024-01-26 RX ADMIN — ONDANSETRON 4 MG: 2 INJECTION INTRAMUSCULAR; INTRAVENOUS at 13:32

## 2024-01-26 RX ADMIN — Medication 20 MG: at 13:15

## 2024-01-26 RX ADMIN — Medication 20 MG: at 11:42

## 2024-01-26 RX ADMIN — FENTANYL CITRATE 25 MCG: 50 INJECTION INTRAMUSCULAR; INTRAVENOUS at 11:33

## 2024-01-26 RX ADMIN — ESMOLOL HYDROCHLORIDE 20 MG: 10 INJECTION, SOLUTION INTRAVENOUS at 13:37

## 2024-01-26 RX ADMIN — PHENYLEPHRINE HYDROCHLORIDE 100 MCG: 10 INJECTION INTRAVENOUS at 11:05

## 2024-01-26 RX ADMIN — FENTANYL CITRATE 50 MCG: 50 INJECTION INTRAMUSCULAR; INTRAVENOUS at 09:23

## 2024-01-26 RX ADMIN — Medication 50 MG: at 08:37

## 2024-01-26 RX ADMIN — SUGAMMADEX 200 MG: 100 INJECTION, SOLUTION INTRAVENOUS at 13:50

## 2024-01-26 RX ADMIN — FENTANYL CITRATE 50 MCG: 50 INJECTION INTRAMUSCULAR; INTRAVENOUS at 10:17

## 2024-01-26 RX ADMIN — MIDAZOLAM 1 MG: 1 INJECTION INTRAMUSCULAR; INTRAVENOUS at 08:22

## 2024-01-26 RX ADMIN — CEFTRIAXONE SODIUM 2 G: 2 INJECTION, POWDER, FOR SOLUTION INTRAMUSCULAR; INTRAVENOUS at 08:50

## 2024-01-26 RX ADMIN — ESMOLOL HYDROCHLORIDE 20 MG: 10 INJECTION, SOLUTION INTRAVENOUS at 13:54

## 2024-01-26 RX ADMIN — SODIUM CHLORIDE, POTASSIUM CHLORIDE, SODIUM LACTATE AND CALCIUM CHLORIDE: 600; 310; 30; 20 INJECTION, SOLUTION INTRAVENOUS at 11:00

## 2024-01-26 RX ADMIN — Medication 20 MG: at 09:50

## 2024-01-26 RX ADMIN — DEXMEDETOMIDINE HYDROCHLORIDE 4 MCG: 100 INJECTION, SOLUTION INTRAVENOUS at 13:44

## 2024-01-26 RX ADMIN — PHENYLEPHRINE HYDROCHLORIDE 100 MCG: 10 INJECTION INTRAVENOUS at 11:42

## 2024-01-26 RX ADMIN — PHENYLEPHRINE HYDROCHLORIDE 100 MCG: 10 INJECTION INTRAVENOUS at 09:39

## 2024-01-26 RX ADMIN — PHENYLEPHRINE HYDROCHLORIDE 100 MCG: 10 INJECTION INTRAVENOUS at 13:12

## 2024-01-26 RX ADMIN — Medication 20 MG: at 11:05

## 2024-01-26 RX ADMIN — PHENYLEPHRINE HYDROCHLORIDE 100 MCG: 10 INJECTION INTRAVENOUS at 13:29

## 2024-01-26 RX ADMIN — PHENYLEPHRINE HYDROCHLORIDE 100 MCG: 10 INJECTION INTRAVENOUS at 09:07

## 2024-01-26 RX ADMIN — ONDANSETRON 4 MG: 2 INJECTION INTRAMUSCULAR; INTRAVENOUS at 14:29

## 2024-01-26 RX ADMIN — ESMOLOL HYDROCHLORIDE 20 MG: 10 INJECTION, SOLUTION INTRAVENOUS at 13:23

## 2024-01-26 RX ADMIN — LIDOCAINE HYDROCHLORIDE 100 MG: 20 INJECTION, SOLUTION INFILTRATION; PERINEURAL at 08:37

## 2024-01-26 RX ADMIN — PHENYLEPHRINE HYDROCHLORIDE 100 MCG: 10 INJECTION INTRAVENOUS at 09:31

## 2024-01-26 RX ADMIN — PROPOFOL 130 MG: 10 INJECTION, EMULSION INTRAVENOUS at 08:37

## 2024-01-26 RX ADMIN — FENTANYL CITRATE 100 MCG: 50 INJECTION INTRAMUSCULAR; INTRAVENOUS at 08:37

## 2024-01-26 RX ADMIN — Medication 20 MG: at 10:27

## 2024-01-26 RX ADMIN — LABETALOL 20 MG/4 ML (5 MG/ML) INTRAVENOUS SYRINGE 5 MG: at 14:08

## 2024-01-26 RX ADMIN — Medication 20 MG: at 09:15

## 2024-01-26 RX ADMIN — DEXAMETHASONE SODIUM PHOSPHATE 10 MG: 10 INJECTION, SOLUTION INTRAMUSCULAR; INTRAVENOUS at 09:00

## 2024-01-26 RX ADMIN — PHENYLEPHRINE HYDROCHLORIDE 300 MCG: 10 INJECTION INTRAVENOUS at 08:38

## 2024-01-26 RX ADMIN — FENTANYL CITRATE 50 MCG: 50 INJECTION INTRAMUSCULAR; INTRAVENOUS at 12:37

## 2024-01-26 RX ADMIN — FENTANYL CITRATE 25 MCG: 50 INJECTION INTRAMUSCULAR; INTRAVENOUS at 10:32

## 2024-01-26 RX ADMIN — ESMOLOL HYDROCHLORIDE 10 MG: 10 INJECTION, SOLUTION INTRAVENOUS at 13:07

## 2024-01-26 RX ADMIN — PHENYLEPHRINE HYDROCHLORIDE 100 MCG: 10 INJECTION INTRAVENOUS at 08:49

## 2024-01-26 RX ADMIN — Medication 10 MG: at 09:33

## 2024-01-26 RX ADMIN — Medication 20 MG: at 12:37

## 2024-01-26 RX ADMIN — PHENYLEPHRINE HYDROCHLORIDE 100 MCG: 10 INJECTION INTRAVENOUS at 10:42

## 2024-01-26 RX ADMIN — PHENYLEPHRINE HYDROCHLORIDE 100 MCG: 10 INJECTION INTRAVENOUS at 10:48

## 2024-01-26 RX ADMIN — SODIUM CHLORIDE, POTASSIUM CHLORIDE, SODIUM LACTATE AND CALCIUM CHLORIDE: 600; 310; 30; 20 INJECTION, SOLUTION INTRAVENOUS at 08:30

## 2024-01-26 ASSESSMENT — ACTIVITIES OF DAILY LIVING (ADL)
ADLS_ACUITY_SCORE: 33

## 2024-01-26 NOTE — PROGRESS NOTES
New Patient Oncology Nurse Navigator Note     Referring provider: Dr. Karina Kumar    Referring Clinic/Organization: [facility]  Referred to: Thoracic Surgery  Requested provider (if applicable): Dr. 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 Please refer to dedicated report for findings seen at the neck  region.  2. A left apical 1.1 cm subpleural nodule with mild FDG uptake.  Left  upper hilar hypermetabolic lymph node.   Differential includes adenocarcinoma, benign etiologies, and less  likely metastasis.  If prior CT imaging is available to determine if  lesion is stable. Alternatively short-term follow-up versus tissue  sampling.     3. No other suspicious lesions in the chest, abdomen, and pelvis.    Clinical Assessment / Barriers to Care (Per Nurse):    Opal is a 67 y/o male, never smoker with recent diagnosis of squamous cell carcinoma of sinonasal mass with a left apical 1.1 cm subpleural nodule with mild FDG update. His case was presented by IR in nodule conference today and it was recommended by Dr. Singer that he see Thoracic Surgery for consult.    Records Location: Epic   Records Needed: None  Additional testing needed prior to consult: PFTs  Referral  updates and Plan:   Per Dr. Enmanuel jolley to use last clinic spot on 2/6 for Opal. Per chart review, pt is currently in surgery today. Will send pt a My Chart message about referral and to expect a call from schedulers.     BARBARA BeeN, RN, OCN  [facility] Oncology Nurse Navigator  [phone] / [phone]

## 2024-01-26 NOTE — OP NOTE
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]  Post-operative diagnosis        Same as pre-operative diagnosis     Procedure:      Stealth guided transnasal approach to resect sinonasal tumor with Rivero-Chito approach., N/A - Nose     Surgeon:         Surgeon(s) and Role:     * Kairna Kumar MD - Primary     * Mira Aguila MD - Resident - Assisting  Anesthesia:     General             Estimated Blood Loss: Less than 100 ml     Drains: None    Specimens removed:    ID Type Source Tests Collected by Time Destination   1 : right inferior turbinate Tissue Sinus SURGICAL PATHOLOGY EXAM Karina Kumar MD 1/26/2024  9:58 AM    2 : right sphenoid sinus Tissue Sinus SURGICAL PATHOLOGY EXAM Karina Kumar MD 1/26/2024 10:38 AM    3 : right ethmoid Tissue Sinus SURGICAL PATHOLOGY EXAM Karina Kumar MD 1/26/2024 10:44 AM    4 : right posterior ethmoid Tissue Sinus SURGICAL PATHOLOGY EXAM Karina Kumar MD 1/26/2024 10:48 AM    5 : right anterior ethmoid Tissue Sinus SURGICAL PATHOLOGY EXAM Karina Kumar MD 1/26/2024 10:49 AM    6 : Right infra orbital nerve Tissue Sinus SURGICAL PATHOLOGY EXAM Karina Kumar MD 1/26/2024 12:32 PM    7 : right maxillary sinus Tissue Sinus, Maxillary, Right SURGICAL PATHOLOGY EXAM Karina Kumar MD 1/26/2024 12:51 PM    8 : orbital floor bone Tissue Other SURGICAL PATHOLOGY EXAM Karina Kumar MD 1/26/2024 12:52 PM    9 : right sinus content Sinus Contents Sinus SURGICAL PATHOLOGY EXAM Karina Kumar MD 1/26/2024  1:10 PM    10 : right maxillary sinus posterior bony wall Tissue Sinus, Maxillary, Right SURGICAL PATHOLOGY EXAM Karina Kumar MD 1/26/2024  1:15 PM      Complications:  None.    Findings: Purulence in posterior ethmoid air  cells. Tumor involving R lateral nasal wall, R maxillary sinus mucosa. Intact bone along R orbital floor. Exposed periorbita but not violated. Tumor adjacent to infraorbital N, proximal end of nerve along posterior wall of maxillary sinus negative on intra-op frozen section. Removal of inferior turbinate. Rivero-kaila approach to R maxillary sinus, removal of 80% of sinus mucosa, preservation of healthy appearing mucosa along floor. Moth-eaten bone overlying PPF sent for permanent section, periosteum intact.  Cauterization of R SPA.     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  The above stated procedure was discussed at length with the patient, as well as the alternatives, with the risks and benefits of each. After consideration and understanding they consented to proceeding.     Image-Guided Surgery: The Stealth navigation device was used to perform stereotactic navigation. The tracking instruments were calibrated appropriately with the headgear, and topical landmarks were confirmed to assure accuracy. During the case, the navigation probes were used to confirm our location within the paranasal sinuses.        Description of Procedure:  The patient was brought into the operating room, placed on the operating table in supine position. The patient was then placed under general anesthesia and airway maintained via oral endotracheal tube. The bed was turned 180 degrees. The Stealth navigation device was used to perform stereotactic navigation. The tracking instruments were calibrated appropriately with the headgear, and topical  landmarks were confirmed to assure accuracy. During the case, the navigation probes were used to confirm our location within the paranasal sinuses.  A timeout was then taken per institution standards to confirm correct patient identity and planned procedure and the patient was prepped with dilute betadine on the face and peridex intraorally. The patient was draped and the appropriate equipment set-up.     The right nasal cavity was irrigated with sterile saline and a zero degree rigid endoscope was taken & used for the entirety of the procedure. We began with injection with 1% lidocaine and epinephrine 1:100,000 into the axilla of the middle turbinate and the right inferior turbinate. We began with medialization of the inferior turbinate and removal using an endoscopic scissors, from just behind the head of the turbinate. This is was passed off for permanent pathology. Once the medial wall of the maxillary sinus was exposed the sinus was entered just below the prior antrostomy site. We then dilated this, anteriorly to the level of the lacrimal sac and inferiorly to the nasal floor. This allowed visualization of the medial half of the maxillary sinus but prevented access to the lateral aspect, thus we decided to proceed with the Rivero-Chito approach. Prior to this we took down the scarred tissue in the anterior ethmoids from his prior surgeries and used a Kerrison to take down the bony partitions. An area of erythematous, polypoid mucosa was sent for frozen pathology. We continued to take down the remaining anterior ethmoid air cells and posterior cells. We encountered purulence in the most posterior ethmoid air cell but the remaining cells contained inflamed mucosa. The ethmoids were dissected until we reached the lamina papyracea, which remained intact. There was a small area of exposed periorbita, but this was distant from any visible tumor. We returned to the posterior nasal cavity and removed a thin layer of  mucosa overlying the sphenoid os and entered the sphenoid. A large sphenoidotomy was made used a Kerrison punch which revealed erythematous mucosa with prominent hypervascularity. Given the change in mucosal appearance a frozen specimen was sent, which returned negative for papilloma or carcinoma. We now began working anteriorly to release the mucosal scar bands until the frontal sinus outflow tract. There was no evidence of mucosal changes or tumor adjacent to the frontal sinus. Given the obvious tumor just posterior to the prior maxillary antrostomy, we opted to create our posterior margin just inferior to the sphenoid and began gently dissecting the mucosa away from the bone as we proceeded anteriorly along the nasal sidewall. During this blunt dissection we encountered the sphenopalatine artery which was successfully cauterized. Once we reached the antrostomy site, we then turned our attention to the oral cavity. A vestibular incision was made through the gingivolabial sulcus and taken down to the maxilla. A freer was used to raise the mucosa up to the infraorbital nerve and we then entered the sinus through the face of the maxilla. We then drilled our bony window  laterally and superiorly to the level of the nerve. We then gently dissected the mucosa of the right maxillary sinus off the underlying bone. Once we had an adequate window, we examined the sinus contents which were notable for abnormal appearing mucosa along the posterior wall and tumor near the antrostomy site, superiorly we found the orbital roof was entirely intact. Given the pre-operative anesthesia to the right infraorbital nerve and proximity to the tumor, we opted to dissect this away from the mucosa and traced it back to the infraorbital canal. We sharply divided this and sent the nerve for frozen pathology. Our dissection continued medially until we met the previously dissected mucosa of the lateral nasal sidewall. Once this was entirely  freed from the underlying bone it was removed en bloc and passed off for permanent pathology. Healthy appearing mucosa along the floor of the maxillary sinus was left intact. The bone overlying the pterygopalatine fossa was moth-eaten and demineralized appearing, thus was gently removed and sent for permanent pathology. The periosteum overlying the fossa was intact. Hydrogen-peroxide soaked pledgets were applied to the posterior wall of the sinus and we returned to the nasal cavity. The drill was used to marie the bone along the lateral nasal wall to smooth the bony partitions. The cavity was copiously irrigated with sterile saline and hemostasis was achieved with monopolar cautery. Surgicel was placed over the exposed periosteum in the maxillary sinus and we then returned to the mouth to close the vestibular incision, with 4-0 vicryl for the muscle and 4-0 chromic in a running locking fashion along the mucosa. The stomach was suctioned with an orogastric tube and this concluded our procedure. The patient was then turned back to the anesthesia team for uneventful wake-up and later brought to PACU.     Dr. Cole was present and participatory for the entirety of the procedure     Patience Aguila MD

## 2024-01-26 NOTE — OR NURSING
1500: RN requesting sign out from anesthesia resident, PAR verbalized they will put it in, pt is ready for phase 2, denies pain, nausea under control, vitally stable on room air

## 2024-01-26 NOTE — BRIEF OP NOTE
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]  Post-operative diagnosis Same as pre-operative diagnosis    Procedure: Stealth guided transnasal approach to resect sinonasal tumor with Rivero-Chito approach., N/A - Nose    Surgeon: Surgeon(s) and Role:     * Karina Kumar MD - Primary     * Mira Aguila MD - Resident - Assisting  Anesthesia: General   Estimated Blood Loss: Less than 100 ml    Drains: None  Specimens:   ID Type Source Tests Collected by Time Destination   1 : right inferior turbinate Tissue Sinus SURGICAL PATHOLOGY EXAM Karina Kumar MD 1/26/2024  9:58 AM    2 : right sphenoid sinus Tissue Sinus SURGICAL PATHOLOGY EXAM Karina Kumar MD 1/26/2024 10:38 AM    3 : right ethmoid Tissue Sinus SURGICAL PATHOLOGY EXAM Karina Kumar MD 1/26/2024 10:44 AM    4 : right posterior ethmoid Tissue Sinus SURGICAL PATHOLOGY EXAM Karina Kumar MD 1/26/2024 10:48 AM    5 : right anterior ethmoid Tissue Sinus SURGICAL PATHOLOGY EXAM Karina Kumar MD 1/26/2024 10:49 AM    6 : Right infra orbital nerve Tissue Sinus SURGICAL PATHOLOGY EXAM Karina Kumar MD 1/26/2024 12:32 PM    7 : right maxillary sinus Tissue Sinus, Maxillary, Right SURGICAL PATHOLOGY EXAM Karina Kumar MD 1/26/2024 12:51 PM    8 : orbital floor bone Tissue Other SURGICAL PATHOLOGY EXAM Karina Kumar MD 1/26/2024 12:52 PM    9 : right sinus content Sinus Contents Sinus SURGICAL PATHOLOGY EXAM Karina Kumar MD 1/26/2024  1:10 PM    10 : right maxillary sinus posterior bony wall Tissue Sinus, Maxillary, Right SURGICAL PATHOLOGY EXAM Karina Kumar MD 1/26/2024  1:15 PM      Findings:  Tumor involving R lateral nasal wall, R maxillary sinus mucosa. Intact  bone along R orbital floor. Exposed periorbita but no violated. Tumor adjacent to infraorbital N, proximal end along posterior wall of maxillary sinus negative on intra-op frozen section. Rivero-kaila approach to R maxillary sinus, removal of 80% of sinus mucosa, preservation of healthy appearing mucosa along floor. Moth-eaten bone overlying PPF sent for permanent section, periosteum intact.   Complications: None.  Implants: * No implants in log *

## 2024-01-26 NOTE — ANESTHESIA PREPROCEDURE EVALUATION
Anesthesia Pre-Procedure Evaluation    Patient: Opal Benitez   MRN: [id number] : 1955        Procedure : Procedure(s):  Stealth guided transnasal approach to resect sinonasal tumor with Rivero-Chito approach.          Past Medical History:   Diagnosis Date    Unspecified essential hypertension     Essential hypertension      Past Surgical History:   Procedure Laterality Date    COLOGUARD(EXACT SCIENCES)  12/10/2018    COLOGUARD(EXACT SCIENCES)  2015    CYSTOSCOPY  2000      No Known Allergies   Social History     Tobacco Use    Smoking status: Never     Passive exposure: Never    Smokeless tobacco: Never    Tobacco comments:     states he never has smokes   Substance Use Topics    Alcohol use: Yes     Alcohol/week: 6.0 standard drinks of alcohol     Types: 6 Cans of beer per week     Comment: occasional beer      Wt Readings from Last 1 Encounters:   24 75 kg (165 lb 5.5 oz)        Anesthesia Evaluation   Pt has had prior anesthetic. Type: MAC and General.        ROS/MED HX  ENT/Pulmonary:  - neg pulmonary ROS     Neurologic:  - neg neurologic ROS     Cardiovascular:     (+)  hypertension- -   -  - -                                      METS/Exercise Tolerance:     Hematologic:  - neg hematologic  ROS     Musculoskeletal:       GI/Hepatic:  - neg GI/hepatic ROS     Renal/Genitourinary:  - neg Renal ROS     Endo:     (+)  type II DM,                    Psychiatric/Substance Use:  - neg psychiatric ROS     Infectious Disease:  - neg infectious disease ROS     Malignancy: Comment: Sinonasal tumor.      Other:            Physical Exam    Airway  airway exam normal      Mallampati: I   TM distance: > 3 FB   Neck ROM: full   Mouth opening: > 3 cm    Respiratory Devices and Support         Dental       (+) Minor Abnormalities - some fillings, tiny chips      Cardiovascular   cardiovascular exam normal       Rhythm and rate: regular and normal     Pulmonary   pulmonary exam normal        breath sounds  "clear to auscultation           OUTSIDE LABS:  CBC:   Lab Results   Component Value Date    WBC 7.4 01/22/2024    WBC 7.5 10/09/2023    HGB 15.0 01/22/2024    HGB 14.8 10/09/2023    HCT 45.8 01/22/2024    HCT 43.8 10/09/2023     01/22/2024     10/09/2023     BMP:   Lab Results   Component Value Date     01/22/2024     10/09/2023    POTASSIUM 4.5 01/22/2024    POTASSIUM 4.2 10/09/2023    CHLORIDE 102 01/22/2024    CHLORIDE 102 10/09/2023    CO2 26 01/22/2024    CO2 22 10/09/2023    BUN 22.9 01/22/2024    BUN 19.7 10/09/2023    CR 0.71 01/22/2024    CR 0.63 (L) 10/09/2023     (H) 01/26/2024     (H) 01/22/2024     COAGS: No results found for: \"PTT\", \"INR\", \"FIBR\"  POC: No results found for: \"BGM\", \"HCG\", \"HCGS\"  HEPATIC: No results found for: \"ALBUMIN\", \"PROTTOTAL\", \"ALT\", \"AST\", \"GGT\", \"ALKPHOS\", \"BILITOTAL\", \"BILIDIRECT\", \"SAURABH\"  OTHER:   Lab Results   Component Value Date    A1C 7.2 (H) 01/22/2024    KELLY 9.4 01/22/2024       Anesthesia Plan    ASA Status:  3    NPO Status:  NPO Appropriate    Anesthesia Type: General.     - Airway: ETT   Induction: Intravenous.   Maintenance: Inhalation.        Consents    Anesthesia Plan(s) and associated risks, benefits, and realistic alternatives discussed. Questions answered and patient/representative(s) expressed understanding.     - Discussed: Risks, Benefits and Alternatives for BOTH SEDATION and the PROCEDURE were discussed     - Discussed with:  Patient      - Extended Intubation/Ventilatory Support Discussed: Yes.      - Patient is DNR/DNI Status: No     Use of blood products discussed: Yes.     - Discussed with: Patient.     Postoperative Care    Pain management: IV analgesics.   PONV prophylaxis: Ondansetron (or other 5HT-3), Dexamethasone or Solumedrol     Comments:    Other Comments: The material risks, benefits, and alternatives were discussed in detail.  The patient agrees to proceed.  The patient has no other complaints at " "this time.           Kishore Prasad MD    I have reviewed the pertinent notes and labs in the chart from the past 30 days and (re)examined the patient.  Any updates or changes from those notes are reflected in this note.              # DMII: A1C = 7.2 % (Ref range: 0.0 - 5.6 %) within past 6 months  # Overweight: Estimated body mass index is 25.9 kg/m  as calculated from the following:    Height as of this encounter: 1.702 m (5' 7\").    Weight as of this encounter: 75 kg (165 lb 5.5 oz).      "

## 2024-01-26 NOTE — ANESTHESIA POSTPROCEDURE EVALUATION
Patient: Opal Benitez    Procedure: Procedure(s):  Stealth guided transnasal approach to resect sinonasal tumor with Rivero-Chito approach.       Anesthesia Type:  General    Note:  Disposition: Outpatient   Postop Pain Control: Uneventful            Sign Out: Well controlled pain   PONV: No   Neuro/Psych: Uneventful            Sign Out: Acceptable/Baseline neuro status   Airway/Respiratory: Uneventful            Sign Out: Acceptable/Baseline resp. status   CV/Hemodynamics: Uneventful            Sign Out: Acceptable CV status; No obvious hypovolemia; No obvious fluid overload   Other NRE: NONE   DID A NON-ROUTINE EVENT OCCUR? No           Last vitals:  Vitals Value Taken Time   /84 01/26/24 1500   Temp 36.5  C (97.7  F) 01/26/24 1405   Pulse 98 01/26/24 1503   Resp 17 01/26/24 1503   SpO2 98 % 01/26/24 1503   Vitals shown include unfiled device data.    Electronically Signed By: Dami Quiroga MD  January 26, 2024  3:05 PM

## 2024-01-26 NOTE — ANESTHESIA CARE TRANSFER NOTE
Patient: Opal Benitez    Procedure: Procedure(s):  Stealth guided transnasal approach to resect sinonasal tumor with Rivero-Chito approach.       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    Anesthesia Type:   General     Note:    Oropharynx: oropharynx clear of all foreign objects and spontaneously breathing  Level of Consciousness: awake  Oxygen Supplementation: room air    Independent Airway: airway patency satisfactory and stable  Dentition: dentition unchanged  Vital Signs Stable: post-procedure vital signs reviewed and stable  Report to RN Given: handoff report given  Patient transferred to: PACU    Handoff Report: Identifed the Patient, Identified the Reponsible Provider, Reviewed the pertinent medical history, Discussed the surgical course, Reviewed Intra-OP anesthesia mangement and issues during anesthesia, Set expectations for post-procedure period and Allowed opportunity for questions and acknowledgement of understanding      Vitals:  Vitals Value Taken Time   /77 01/26/24 1411   Temp 36.5  C (97.7  F) 01/26/24 1405   Pulse 101 01/26/24 1414   Resp 29 01/26/24 1414   SpO2 96 % 01/26/24 1414   Vitals shown include unfiled device data.    Electronically Signed By: LILIAN Correa CRNA  January 26, 2024  2:15 PM

## 2024-01-26 NOTE — OR NURSING
1405: Pt came to PACU profusely sweating, disoriented to place and time, , PO temp 97.7, vitally stable on room air, pt states they feel nauseous, weak, and confused, full neuro exam done (see flowsheet), no major neuro deficits noted, orientation improving as pt is clearing anesthesia, Dami HICKS doctor notified, no intervention ordered, RN at bedside

## 2024-01-26 NOTE — DISCHARGE INSTRUCTIONS
Contacting your Doctor -   To contact a doctor, call Dr Kumar' ENT clinic at [phone]   or:  [phone] and ask for the resident on call for ENT-Otolaryngology (answered 24 hours a day)   Emergency Department:  [facility]: [phone]  [facility]: [phone]  911 if you are in need of immediate or emergent help    911 if you are in need of immediate or emergent help

## 2024-01-29 NOTE — TELEPHONE ENCOUNTER
Writer called Opal and discussed the referral with him. He asked about the PET scan report recommending either short-term follolw up verus tissue sampling. Writer explained the surgeon will discuss what they recommend so that discussion is best to be had during the Thoracic surgery consult. Opal thanked writer for the call and information and is ready to schedule. His call was warm transferred to NPS.     BARBARA BeeN, RN, OCN  [facility] Oncology Nurse Navigator  [phone] / [phone]

## 2024-01-30 ENCOUNTER — TELEPHONE (OUTPATIENT)
Dept: OTOLARYNGOLOGY | Facility: CLINIC | Age: 69
End: 2024-01-30
Payer: COMMERCIAL

## 2024-01-30 NOTE — PATIENT INSTRUCTIONS
1. Please follow-up in clinic in 4 weeks    2. Please call the ENT clinic with any questions,concerns, new or worsening symptoms.    -Clinic number is [phone]   - Peg's direct line (Dr. Cole's nurse) [phone]

## 2024-01-30 NOTE — TELEPHONE ENCOUNTER
Responsible Attending Physician:   Date of Discharge:     Discharge to:  Home     Current Status:  Pt is a 69 y/o Male s/p Stealth guided transnasal approach to resect sinonasal tumor with Rivero-Chito approach on 1/26/24 .  Reports pre-op symptoms improved. Operative pain is decreasing.  Ambulating without assistance.  Pain well controlled with current meds, ample supply. Denies redness, swelling, increased tenderness, or elevated temp.  Denies current bowel or bladder issues.  No visible sutures/staples in place.        Discharge instructions and medication use were reviewed.  RN triage/on call number given:  [phone] or after hours and w/e - [phone].  Patient verbalized understanding and agreement with current plan.     Follow up appointments/imaging/tests needed: 2/1/24    Peg Ordoñez RN on 1/30/2024 at 3:04 PM

## 2024-01-30 NOTE — TELEPHONE ENCOUNTER
RECORDS STATUS - ALL OTHER DIAGNOSIS      RECORDS RECEIVED FROM: [facility]   DATE RECEIVED:    NOTES STATUS DETAILS   OFFICE NOTE from referring provider Epic 1/11/24: Dr. Karina Jules    OFFICE NOTE from other specialist External: [facility]  11/20/23: Dr. Shawn Hampton    OPERATIVE REPORT Epic/External: [facility] 1/26/24: resect sinonasal tumor   10/26/23: Right Sinus Curettage    MEDICATION LIST [facility]    LABS     PATHOLOGY REPORTS Report in Epic/- [facility]  Path Consult:  12/26/23: [id number] ([id number])     Surg Path:  1/26/24: [id number]   10/26/23: [id number]    ANYTHING RELATED TO DIAGNOSIS Epic Most recent 1/26/24   IMAGING (NEED IMAGES & REPORT)     CT SCANS PACS 1/26/24: CT Facial Bones  12/29/23: CT Soft Tissue Neck  12/29/23: CT Chest Abd Pel   MRI PACS 1/9/24: MR Sinonasal Cavity    PET PACS 12/29/23: PET Eyes to Thighs

## 2024-02-01 ENCOUNTER — OFFICE VISIT (OUTPATIENT)
Dept: OTOLARYNGOLOGY | Facility: CLINIC | Age: 69
End: 2024-02-01
Payer: COMMERCIAL

## 2024-02-01 ENCOUNTER — TELEPHONE (OUTPATIENT)
Dept: OTOLARYNGOLOGY | Facility: CLINIC | Age: 69
End: 2024-02-01

## 2024-02-01 VITALS
WEIGHT: 162.9 LBS | SYSTOLIC BLOOD PRESSURE: 113 MMHG | BODY MASS INDEX: 25.57 KG/M2 | HEART RATE: 114 BPM | OXYGEN SATURATION: 97 % | DIASTOLIC BLOOD PRESSURE: 74 MMHG | HEIGHT: 67 IN

## 2024-02-01 DIAGNOSIS — C31.9 MALIGNANT NEOPLASM OF ACCESSORY SINUS (H): Primary | ICD-10-CM

## 2024-02-01 PROCEDURE — 31231 NASAL ENDOSCOPY DX: CPT | Performed by: OTOLARYNGOLOGY

## 2024-02-01 RX ORDER — GABAPENTIN 300 MG/1
900 CAPSULE ORAL 3 TIMES DAILY
Qty: 270 CAPSULE | Refills: 3 | Status: CANCELLED | OUTPATIENT
Start: 2024-02-01

## 2024-02-01 ASSESSMENT — PAIN SCALES - GENERAL: PAINLEVEL: MODERATE PAIN (5)

## 2024-02-01 NOTE — PROGRESS NOTES
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  He recovered really well from surgery.  Denies epistaxis.  Very minimal pain.  No visual changes.    MEDICATIONS:     Current Outpatient Medications   Medication Sig Dispense Refill    amoxicillin-clavulanate (AUGMENTIN) 875-125 MG tablet Take 1 tablet by mouth 2 times daily for 21 days 42 tablet 0    chlorhexidine (PERIDEX) 0.12 % solution Swish and spit 5 mLs in mouth 2 times daily for 10 days 118 mL 0    dapagliflozin (FARXIGA) 10 MG TABS tablet Take 1 tablet (10 mg) by mouth daily 90 tablet 3    fish oil-omega-3 fatty acids 1000 MG capsule Take 2 g by mouth daily      lisinopril-hydrochlorothiazide (ZESTORETIC) 20-12.5 MG tablet Take 1 tablet by mouth daily 90 tablet 3    metFORMIN (GLUCOPHAGE XR) 500 MG 24 hr tablet Take 4 tablets (2,000 mg) by mouth daily 360 tablet 3    pravastatin (PRAVACHOL) 40 MG tablet Take 1 tablet (40 mg) by mouth daily 90 tablet 3    sodium chloride (OCEAN) 0.65 % nasal spray Spray two sprays into both sides of your nose every 3 hours while you are awake 88 mL 3       ALLERGIES:  No Known Allergies    PAST MEDICAL HISTORY:   Past Medical History:   Diagnosis Date    Unspecified essential hypertension     Essential hypertension        FAMILY HISTORY:    Family History   Problem Relation Age of Onset    Hypertension Father     Lung Cancer Father     Diabetes Type 2  Father     Diabetes No family hx of     Eye Disorder No family hx of        REVIEW OF SYSTEMS:  12 point ROS was negative other than the symptoms noted above in the HPI.    Nasal endoscopy: Consent for nasal endoscopy was obtained.  I confirmed correctness of the procedure and identity of the patient.  Nasal endoscopy was indicated due to recent  transnasal endoscopic approach.  The nose was topically decongested and anesthetized.  The nasal endoscope was passed under endoscopic vision.  On the right side the septum is in the midline.  I admit the scope posteriorly.  There is a large crust the covering is the surgical site.  I did not want to disturb the crust due to high risk of bleeding.    IMPRESSION AND PLAN: 68-year-old male status post transnasal endoscopic resection of right sinonasal squamous cell carcinoma patient is doing well.  Pathology is pending.  Patient is going to be evaluated for his lung nodule.  He is also to meet with Dr. Johana Alfonso from radiation oncology.  I would like to see him back in 4 weeks.        Karina Kumar MD, M.S.  Otolaryngology- Head & Neck Surgery  [phone]

## 2024-02-01 NOTE — LETTER
2/1/2024       RE: Opal Benitez  [address] [facility] [address]     Dear Colleague,    Thank you for referring your patient, Opal Benitez, to the [facility] [place] [facility]. Please see a copy of my visit note below.    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  He recovered really well from surgery.  Denies epistaxis.  Very minimal pain.  No visual changes.    MEDICATIONS:     Current Outpatient Medications   Medication Sig Dispense Refill    amoxicillin-clavulanate (AUGMENTIN) 875-125 MG tablet Take 1 tablet by mouth 2 times daily for 21 days 42 tablet 0    chlorhexidine (PERIDEX) 0.12 % solution Swish and spit 5 mLs in mouth 2 times daily for 10 days 118 mL 0    dapagliflozin (FARXIGA) 10 MG TABS tablet Take 1 tablet (10 mg) by mouth daily 90 tablet 3    fish oil-omega-3 fatty acids 1000 MG capsule Take 2 g by mouth daily      lisinopril-hydrochlorothiazide (ZESTORETIC) 20-12.5 MG tablet Take 1 tablet by mouth daily 90 tablet 3    metFORMIN (GLUCOPHAGE XR) 500 MG 24 hr tablet Take 4 tablets (2,000 mg) by mouth daily 360 tablet 3    pravastatin (PRAVACHOL) 40 MG tablet Take 1 tablet (40 mg) by mouth daily 90 tablet 3    sodium chloride (OCEAN) 0.65 % nasal spray Spray two sprays into both sides of your nose every 3 hours while you are awake 88 mL 3       ALLERGIES:  No Known Allergies    PAST MEDICAL HISTORY:   Past Medical History:   Diagnosis Date    Unspecified essential hypertension     Essential hypertension        FAMILY HISTORY:    Family History   Problem Relation Age of Onset    Hypertension Father     Lung Cancer Father     Diabetes Type 2  Father     Diabetes No family hx  of     Eye Disorder No family hx of        REVIEW OF SYSTEMS:  12 point ROS was negative other than the symptoms noted above in the HPI.    Nasal endoscopy: Consent for nasal endoscopy was obtained.  I confirmed correctness of the procedure and identity of the patient.  Nasal endoscopy was indicated due to recent transnasal endoscopic approach.  The nose was topically decongested and anesthetized.  The nasal endoscope was passed under endoscopic vision.  On the right side the septum is in the midline.  I admit the scope posteriorly.  There is a large crust the covering is the surgical site.  I did not want to disturb the crust due to high risk of bleeding.    IMPRESSION AND PLAN: 68-year-old male status post transnasal endoscopic resection of right sinonasal squamous cell carcinoma patient is doing well.  Pathology is pending.  Patient is going to be evaluated for his lung nodule.  He is also to meet with Dr. Johana Alfonso from radiation oncology.  I would like to see him back in 4 weeks.        Karina Kumar MD, M.S.  Otolaryngology- Head & Neck Surgery  [phone]

## 2024-02-02 ENCOUNTER — OFFICE VISIT (OUTPATIENT)
Dept: RADIATION ONCOLOGY | Facility: CLINIC | Age: 69
End: 2024-02-02
Attending: OTOLARYNGOLOGY
Payer: COMMERCIAL

## 2024-02-02 ENCOUNTER — PRE VISIT (OUTPATIENT)
Dept: RADIATION ONCOLOGY | Facility: CLINIC | Age: 69
End: 2024-02-02
Payer: COMMERCIAL

## 2024-02-02 VITALS
HEART RATE: 72 BPM | DIASTOLIC BLOOD PRESSURE: 80 MMHG | SYSTOLIC BLOOD PRESSURE: 135 MMHG | WEIGHT: 162 LBS | BODY MASS INDEX: 25.37 KG/M2

## 2024-02-02 DIAGNOSIS — C31.9 MALIGNANT NEOPLASM OF PARANASAL SINUS (H): Primary | ICD-10-CM

## 2024-02-02 DIAGNOSIS — C31.9: ICD-10-CM

## 2024-02-02 PROCEDURE — 99205 OFFICE O/P NEW HI 60 MIN: CPT | Performed by: RADIOLOGY

## 2024-02-02 PROCEDURE — 99213 OFFICE O/P EST LOW 20 MIN: CPT | Performed by: RADIOLOGY

## 2024-02-02 PROCEDURE — 99417 PROLNG OP E/M EACH 15 MIN: CPT | Performed by: RADIOLOGY

## 2024-02-02 ASSESSMENT — ENCOUNTER SYMPTOMS
GASTROINTESTINAL NEGATIVE: 1
RESPIRATORY NEGATIVE: 1
NEUROLOGICAL NEGATIVE: 1
WEIGHT LOSS: 1
EYES NEGATIVE: 1
DEPRESSION: 1
MUSCULOSKELETAL NEGATIVE: 1
NERVOUS/ANXIOUS: 1
SINUS PAIN: 1
CARDIOVASCULAR NEGATIVE: 1

## 2024-02-02 NOTE — PROGRESS NOTES
RADIATION ONCOLOGY CONSULTATION  DATE OF VISIT: Feb 2, 2024    Opal Benitez  MRN: [id number]    PROBLEM:   Mr. Opal Benitez was seen for initial consultation in the Department of Radiation Oncology on 2/2/2024 at the request of Dr. 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     HISTORY OF PRESENT ILLNESS:     Mr. Opal Benitez is a 68-year-old man who underwent a right maxillary antrostomy, right total ethmoidectomy and right sphenoidotomy on October 26, 2023 by Dr. Shawn Hampton at [facility].  Pathology (reviewed at [facility]) demonstrated invasive squamous cell carcinoma in a background of inverted squamous papilloma.  NATHAN staining was negative.  Postoperatively, he noted dramatic improvement in nasal stuffiness.    He saw Dr. Dr. Kumar on 12/21/2023 at which time nasal endoscopy demonstrated some right-sided granulation tissue/papilloma tissue in the area of the anterior and posterior ethmoids.    PET CT scan on 12/29/2023 demonstrated a peripheral soft tissue density within the right maxillary sinus with focal hypermetabolism at the superior aspect of the antrostomy along the superior medial aspect of the right maxillary sinus (SUV max 6.79).  Within the sinonasal region, there was postsurgical changes of a right maxillary antrostomy, right ethmoidectomy, right sphenoidotomy and mass resection with mild to moderate mucosal thickening within the right frontoethmoidal region and residual right ethmoid air cells.  The left maxillary sinus, left ethmoid air cells, sphenoid and frontal sinuses are within normal limits.  Additionally, there was diffuse and relatively symmetric tonsillar uptake.  There was mild FDG uptake in a left level IIB lymph node.  On the whole-body PET CT scan, there was a 1.1 cm left apical subpleural nodule with mild FDG uptake and a left upper hilar hypermetabolic lymph node.  There were no other suspicious lesions  within the chest, abdomen or pelvis.    MRI on 1/9/2024 demonstrated postsurgical changes of a right maxillary antrostomy, right ethmoidectomy and right sphenoidotomy with resection of part of the previously seen mass.  There was residual T1 hyperintense T2 intermediate signal and postcontrast heterogeneously enhancing tissue in the right lower posterior ethmoid air cells extending to involve the superior right maxillary sinus along the inferior and medial right orbital wall.  In total, the mass measures 4.1 x 1.2 cm when measured in the coronal plane and 2.3 cm in the AP dimension when measured in the axial plane.  There was destruction of the posterior aspects of the medial and inferior orbital wall and the osseous invasion included the infraorbital foramen with invasion of the right infraorbital nerve.  There was no definite perineural extension.  There was abutment of the right inferior and medial rectus muscles.  The frontal, sphenoid, left maxillary and left ethmoid sinuses were clear.  There was stable bilateral level 2 lymph nodes each demonstrating a fatty hilum with no associated FDG avidity on recent PET CT scan.    His case was discussed in multidisciplinary tumor conference on 1/19/2024 with recommendation for biopsy of the subpleural nodule.    On 1/26/2024, he underwent Stealth guided transnasal approach and resection of sinonasal tumor with Rivero-Chito approach.  Pathology results are pending at the time of this consultation.    His postoperative recovery has been uneventful.  He is not having any visual problems and pain is minimal.  He denies any headaches, nausea, vomiting, fever.    PAST MEDICAL HISTORY:   Past Medical History:   Diagnosis Date    Unspecified essential hypertension     Essential hypertension   Diabetes mellitus    PAST SURGICAL HISTORY:   Past Surgical History:   Procedure Laterality Date    YESY(EXACT SCIENCES)  12/10/2018    YESY(EXACT SCIENCES)  11/18/2015     CYSTOSCOPY  2000    OPTICAL TRACKING SYSTEM ENDOSCOPIC SINUS SURGERY N/A 1/26/2024    Procedure: Stealth guided transnasal approach to resect sinonasal tumor with Rivero-Chito approach.;  Surgeon: Karina Kumar MD;  Location: [facility] OR     CHEMOTHERAPY HISTORY: None    PAST RADIATION THERAPY HISTORY: None     IMPLANTABLE CARDIAC DEVICE: None    MEDICATIONS:   Current Outpatient Medications   Medication Sig Dispense Refill    amoxicillin-clavulanate (AUGMENTIN) 875-125 MG tablet Take 1 tablet by mouth 2 times daily for 21 days 42 tablet 0    chlorhexidine (PERIDEX) 0.12 % solution Swish and spit 5 mLs in mouth 2 times daily for 10 days 118 mL 0    dapagliflozin (FARXIGA) 10 MG TABS tablet Take 1 tablet (10 mg) by mouth daily 90 tablet 3    fish oil-omega-3 fatty acids 1000 MG capsule Take 2 g by mouth daily      lisinopril-hydrochlorothiazide (ZESTORETIC) 20-12.5 MG tablet Take 1 tablet by mouth daily 90 tablet 3    metFORMIN (GLUCOPHAGE XR) 500 MG 24 hr tablet Take 4 tablets (2,000 mg) by mouth daily 360 tablet 3    pravastatin (PRAVACHOL) 40 MG tablet Take 1 tablet (40 mg) by mouth daily 90 tablet 3    sodium chloride (OCEAN) 0.65 % nasal spray Spray two sprays into both sides of your nose every 3 hours while you are awake 88 mL 3      ALLERGIES:   Allergies as of 02/02/2024    (No Known Allergies)     SOCIAL HISTORY:  Social History     Socioeconomic History    Marital status:      Spouse name: Not on file    Number of children: Not on file    Years of education: Not on file    Highest education level: Not on file   Occupational History    Not on file   Tobacco Use    Smoking status: Never     Passive exposure: Never    Smokeless tobacco: Never    Tobacco comments:     states he never has smokes   Vaping Use    Vaping Use: Never used   Substance and Sexual Activity    Alcohol use: Yes     Alcohol/week: 6.0 standard drinks of alcohol     Types: 6 Cans of beer per week     Comment: occasional  beer    Drug use: Never    Sexual activity: Not on file   Other Topics Concern    Not on file   Social History Narrative    Not on file     Social Determinants of Health     Financial Resource Strain: Low Risk  (1/22/2024)    Financial Resource Strain     Within the past 12 months, have you or your family members you live with been unable to get utilities (heat, electricity) when it was really needed?: No   Food Insecurity: Low Risk  (1/22/2024)    Food Insecurity     Within the past 12 months, did you worry that your food would run out before you got money to buy more?: No     Within the past 12 months, did the food you bought just not last and you didn t have money to get more?: No   Transportation Needs: Low Risk  (1/22/2024)    Transportation Needs     Within the past 12 months, has lack of transportation kept you from medical appointments, getting your medicines, non-medical meetings or appointments, work, or from getting things that you need?: No   Physical Activity: Not on file   Stress: Not on file   Social Connections: Not on file   Interpersonal Safety: Low Risk  (10/9/2023)    Interpersonal Safety     Do you feel physically and emotionally safe where you currently live?: Yes     Within the past 12 months, have you been hit, slapped, kicked or otherwise physically hurt by someone?: No     Within the past 12 months, have you been humiliated or emotionally abused in other ways by your partner or ex-partner?: No   Housing Stability: Low Risk  (1/22/2024)    Housing Stability     Do you have housing? : Yes     Are you worried about losing your housing?: No     FAMILY HISTORY:  Family History   Problem Relation Age of Onset    Hypertension Father     Lung Cancer Father     Diabetes Type 2  Father     Diabetes No family hx of     Eye Disorder No family hx of      REVIEW OF SYMPTOMS:  A full 12-point review of systems was performed. All pertinent positives and negatives are noted in HPI.    FUNCTIONAL STATUS:  ECO    PHYSICAL EXAMINATION:    /80   Pulse 72   Wt 73.5 kg (162 lb)   BMI 25.37 kg/m    Exam:  General: Alert, oriented, in no acute distress  HEENT: Normocephalic, atraumatic, oral cavity/oropharynx without mucosal lesion   Neck: No palpable adenopathy  Respiratory: Breathing comfortably on room air, no wheezing  Cardiovascular: Regular rate, extremities warm, well-perfused  Abdomen: Nondistended  Extremities: Without cyanosis or edema  Musculoskeletal: Normal muscle bulk and tone  Skin: No visible rash  Cranial nerves II through XII are grossly intact aside from numbness to light touch overlying the right cheek normal gait  Psych: Appropriate affect    IMAGING/PATH: Please refer to history of present illness.    LABORATORY:  Lab Results   Component Value Date    WBC 7.4 2024    HGB 12.7 (L) 2024    HCT 45.8 2024    MCV 96 2024     2024     Lab Results   Component Value Date    CR 0.71 2024     Lab Results   Component Value Date     2024    POTASSIUM 4.5 2024    CHLORIDE 102 2024    CO2 26 2024     (H) 2024     ASSESSMENT AND PLAN:   Mr. 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    Final pathology from his most recent surgical procedure is pending.    I reviewed the status of his disease and what we know so far with the patient.  I discussed recommendations for adjuvant radiation to minimize the chance of local recurrence in the paranasal sinuses.  I reviewed the recommended course of therapy, expected acute toxicities, potential long-term risks and expected outcome from treatment.    Mr. Benitez asked several pertinent questions which were answered such that they verbalized understanding of the issues discussed.  Informed consent was obtained.    He has already seen his dentist. I did discuss  using gabapentin for pain management during his course of radiation as well as the typical side effects.  At this time, the patient is not interested in taking gabapentin for pain management during treatment.      If final pathology margins are negative, we will plan 6000 cGy in 30 fractions with IMRT inclusive of right level 1B and 2 cervical nodes.    We appreciate the opportunity to participate in Mr. Benitez's care.  Please call with questions.    95 minutes spent by me on the date of the encounter doing chart review, history and exam, documentation and further activities per the note.    Johana Galeas MD  Department of Radiation Oncology  [facility]    CC  Patient Care Team:  Aiden Drummond MD as PCP - General (Internal Medicine)  Aiden Drummond MD as Assigned PCP  Karina Kumar MD as MD (Otolaryngology)  Karina Kumar MD as Assigned Surgical Provider  Johana Galeas MD as MD (Radiation Oncology)  Bernardino Singer MD as MD (Cardiovascular & Thoracic Surgery)  KARINA KUMAR    This note was dictated with voice recognition software and then edited. Please excuse any unintentional errors.

## 2024-02-02 NOTE — LETTER
2/2/2024         RE: Opal Benitez  [address] [facility] [address]        Dear Colleague,    Thank you for referring your patient, Opal Benitez, to the [facility] RADIATION ONCOLOGY. Please see a copy of my visit note below.    RADIATION ONCOLOGY CONSULTATION  DATE OF VISIT: Feb 2, 2024    Opal Benitez  MRN: [id number]    PROBLEM:   Mr. Opal Benitez was seen for initial consultation in the Department of Radiation Oncology on 2/2/2024 at the request of Dr. 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     HISTORY OF PRESENT ILLNESS:     Mr. Opal Benitez is a 68-year-old man who underwent a right maxillary antrostomy, right total ethmoidectomy and right sphenoidotomy on October 26, 2023 by Dr. Shawn Hampton at [facility].  Pathology (reviewed at [facility]) demonstrated invasive squamous cell carcinoma in a background of inverted squamous papilloma.  NATHAN staining was negative.  Postoperatively, he noted dramatic improvement in nasal stuffiness.    He saw Dr. Dr. Kumar on 12/21/2023 at which time nasal endoscopy demonstrated some right-sided granulation tissue/papilloma tissue in the area of the anterior and posterior ethmoids.    PET CT scan on 12/29/2023 demonstrated a peripheral soft tissue density within the right maxillary sinus with focal hypermetabolism at the superior aspect of the antrostomy along the superior medial aspect of the right maxillary sinus (SUV max 6.79).  Within the sinonasal region, there was postsurgical changes of a right maxillary antrostomy, right ethmoidectomy, right sphenoidotomy and mass resection with mild to moderate mucosal thickening within the right frontoethmoidal region and residual right ethmoid air cells.  The left maxillary sinus, left ethmoid air cells, sphenoid and frontal sinuses are within normal limits.  Additionally, there was diffuse and relatively symmetric tonsillar uptake.   There was mild FDG uptake in a left level IIB lymph node.  On the whole-body PET CT scan, there was a 1.1 cm left apical subpleural nodule with mild FDG uptake and a left upper hilar hypermetabolic lymph node.  There were no other suspicious lesions within the chest, abdomen or pelvis.    MRI on 1/9/2024 demonstrated postsurgical changes of a right maxillary antrostomy, right ethmoidectomy and right sphenoidotomy with resection of part of the previously seen mass.  There was residual T1 hyperintense T2 intermediate signal and postcontrast heterogeneously enhancing tissue in the right lower posterior ethmoid air cells extending to involve the superior right maxillary sinus along the inferior and medial right orbital wall.  In total, the mass measures 4.1 x 1.2 cm when measured in the coronal plane and 2.3 cm in the AP dimension when measured in the axial plane.  There was destruction of the posterior aspects of the medial and inferior orbital wall and the osseous invasion included the infraorbital foramen with invasion of the right infraorbital nerve.  There was no definite perineural extension.  There was abutment of the right inferior and medial rectus muscles.  The frontal, sphenoid, left maxillary and left ethmoid sinuses were clear.  There was stable bilateral level 2 lymph nodes each demonstrating a fatty hilum with no associated FDG avidity on recent PET CT scan.    His case was discussed in multidisciplinary tumor conference on 1/19/2024 with recommendation for biopsy of the subpleural nodule.    On 1/26/2024, he underwent Stealth guided transnasal approach and resection of sinonasal tumor with Rivero-Chito approach.  Pathology results are pending at the time of this consultation.    His postoperative recovery has been uneventful.  He is not having any visual problems and pain is minimal.  He denies any headaches, nausea, vomiting, fever.    PAST MEDICAL HISTORY:   Past Medical History:   Diagnosis Date     Unspecified essential hypertension     Essential hypertension   Diabetes mellitus    PAST SURGICAL HISTORY:   Past Surgical History:   Procedure Laterality Date    COLOGUAIVANA(EXACT SCIENCES)  12/10/2018    COLOGUARD(EXACT SCIENCES)  11/18/2015    CYSTOSCOPY  2000    OPTICAL TRACKING SYSTEM ENDOSCOPIC SINUS SURGERY N/A 1/26/2024    Procedure: Stealth guided transnasal approach to resect sinonasal tumor with Rivero-Chito approach.;  Surgeon: Karina Kumar MD;  Location: [facility] OR     CHEMOTHERAPY HISTORY: None    PAST RADIATION THERAPY HISTORY: None     IMPLANTABLE CARDIAC DEVICE: None    MEDICATIONS:   Current Outpatient Medications   Medication Sig Dispense Refill    amoxicillin-clavulanate (AUGMENTIN) 875-125 MG tablet Take 1 tablet by mouth 2 times daily for 21 days 42 tablet 0    chlorhexidine (PERIDEX) 0.12 % solution Swish and spit 5 mLs in mouth 2 times daily for 10 days 118 mL 0    dapagliflozin (FARXIGA) 10 MG TABS tablet Take 1 tablet (10 mg) by mouth daily 90 tablet 3    fish oil-omega-3 fatty acids 1000 MG capsule Take 2 g by mouth daily      lisinopril-hydrochlorothiazide (ZESTORETIC) 20-12.5 MG tablet Take 1 tablet by mouth daily 90 tablet 3    metFORMIN (GLUCOPHAGE XR) 500 MG 24 hr tablet Take 4 tablets (2,000 mg) by mouth daily 360 tablet 3    pravastatin (PRAVACHOL) 40 MG tablet Take 1 tablet (40 mg) by mouth daily 90 tablet 3    sodium chloride (OCEAN) 0.65 % nasal spray Spray two sprays into both sides of your nose every 3 hours while you are awake 88 mL 3      ALLERGIES:   Allergies as of 02/02/2024    (No Known Allergies)     SOCIAL HISTORY:  Social History     Socioeconomic History    Marital status:      Spouse name: Not on file    Number of children: Not on file    Years of education: Not on file    Highest education level: Not on file   Occupational History    Not on file   Tobacco Use    Smoking status: Never     Passive exposure: Never    Smokeless tobacco: Never     Tobacco comments:     states he never has smokes   Vaping Use    Vaping Use: Never used   Substance and Sexual Activity    Alcohol use: Yes     Alcohol/week: 6.0 standard drinks of alcohol     Types: 6 Cans of beer per week     Comment: occasional beer    Drug use: Never    Sexual activity: Not on file   Other Topics Concern    Not on file   Social History Narrative    Not on file     Social Determinants of Health     Financial Resource Strain: Low Risk  (1/22/2024)    Financial Resource Strain     Within the past 12 months, have you or your family members you live with been unable to get utilities (heat, electricity) when it was really needed?: No   Food Insecurity: Low Risk  (1/22/2024)    Food Insecurity     Within the past 12 months, did you worry that your food would run out before you got money to buy more?: No     Within the past 12 months, did the food you bought just not last and you didn t have money to get more?: No   Transportation Needs: Low Risk  (1/22/2024)    Transportation Needs     Within the past 12 months, has lack of transportation kept you from medical appointments, getting your medicines, non-medical meetings or appointments, work, or from getting things that you need?: No   Physical Activity: Not on file   Stress: Not on file   Social Connections: Not on file   Interpersonal Safety: Low Risk  (10/9/2023)    Interpersonal Safety     Do you feel physically and emotionally safe where you currently live?: Yes     Within the past 12 months, have you been hit, slapped, kicked or otherwise physically hurt by someone?: No     Within the past 12 months, have you been humiliated or emotionally abused in other ways by your partner or ex-partner?: No   Housing Stability: Low Risk  (1/22/2024)    Housing Stability     Do you have housing? : Yes     Are you worried about losing your housing?: No     FAMILY HISTORY:  Family History   Problem Relation Age of Onset    Hypertension Father     Lung Cancer  Father     Diabetes Type 2  Father     Diabetes No family hx of     Eye Disorder No family hx of      REVIEW OF SYMPTOMS:  A full 12-point review of systems was performed. All pertinent positives and negatives are noted in HPI.    FUNCTIONAL STATUS: ECO    PHYSICAL EXAMINATION:    /80   Pulse 72   Wt 73.5 kg (162 lb)   BMI 25.37 kg/m    Exam:  General: Alert, oriented, in no acute distress  HEENT: Normocephalic, atraumatic, oral cavity/oropharynx without mucosal lesion   Neck: No palpable adenopathy  Respiratory: Breathing comfortably on room air, no wheezing  Cardiovascular: Regular rate, extremities warm, well-perfused  Abdomen: Nondistended  Extremities: Without cyanosis or edema  Musculoskeletal: Normal muscle bulk and tone  Skin: No visible rash  Cranial nerves II through XII are grossly intact aside from numbness to light touch overlying the right cheek normal gait  Psych: Appropriate affect    IMAGING/PATH: Please refer to history of present illness.    LABORATORY:  Lab Results   Component Value Date    WBC 7.4 2024    HGB 12.7 (L) 2024    HCT 45.8 2024    MCV 96 2024     2024     Lab Results   Component Value Date    CR 0.71 2024     Lab Results   Component Value Date     2024    POTASSIUM 4.5 2024    CHLORIDE 102 2024    CO2 26 2024     (H) 2024     ASSESSMENT AND PLAN:   Mr. 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    Final pathology from his most recent surgical procedure is pending.    I reviewed the status of his disease and what we know so far with the patient.  I discussed recommendations for adjuvant radiation to minimize the chance of local recurrence in the paranasal sinuses.  I reviewed the recommended course of therapy, expected acute toxicities, potential long-term risks and  expected outcome from treatment.    Mr. Benitez asked several pertinent questions which were answered such that they verbalized understanding of the issues discussed.  Informed consent was obtained.    He has already seen his dentist. I did discuss using gabapentin for pain management during his course of radiation as well as the typical side effects.  At this time, the patient is not interested in taking gabapentin for pain management during treatment.      If final pathology margins are negative, we will plan 6000 cGy in 30 fractions with IMRT inclusive of right level 1B and 2 cervical nodes.    We appreciate the opportunity to participate in Mr. Benitez's care.  Please call with questions.    95 minutes spent by me on the date of the encounter doing chart review, history and exam, documentation and further activities per the note.    Johana Galeas MD  Department of Radiation Oncology  [facility]    CC  Patient Care Team:  Aiden Drummond MD as PCP - General (Internal Medicine)  Aiden Drummond MD as Assigned PCP  Karina Kumar MD as MD (Otolaryngology)  Karina Kumar MD as Assigned Surgical Provider  Johana Galeas MD as MD (Radiation Oncology)  Bernardino Singer MD as MD (Cardiovascular & Thoracic Surgery)  KARINA KUMAR    This note was dictated with voice recognition software and then edited. Please excuse any unintentional errors.      HPI  INITIAL PATIENT ASSESSMENT    Diagnosis: Cancer    Prior radiation therapy: None    Prior chemotherapy: None    Prior hormonal therapy:No    Pain Eval:  Denies    Psychosocial  Living arrangements: Lives with family  Fall Risk: independent   referral needs: Not needed    Advanced Directive: No  Implantable Cardiac Device? No      Review of Systems   Constitutional:  Positive for malaise/fatigue and weight loss.   HENT:  Positive for sinus pain.    Eyes: Negative.    Respiratory: Negative.      Cardiovascular: Negative.    Gastrointestinal: Negative.    Genitourinary: Negative.    Musculoskeletal: Negative.    Neurological: Negative.    Endo/Heme/Allergies: Negative.    Psychiatric/Behavioral:  Positive for depression. The patient is nervous/anxious.                  Again, thank you for allowing me to participate in the care of your patient.        Sincerely,        Johana Galeas MD

## 2024-02-02 NOTE — PROGRESS NOTES
HPI  INITIAL PATIENT ASSESSMENT    Diagnosis: Cancer    Prior radiation therapy: None    Prior chemotherapy: None    Prior hormonal therapy:No    Pain Eval:  Denies    Psychosocial  Living arrangements: Lives with family  Fall Risk: independent   referral needs: Not needed    Advanced Directive: No  Implantable Cardiac Device? No      Review of Systems   Constitutional:  Positive for malaise/fatigue and weight loss.   HENT:  Positive for sinus pain.    Eyes: Negative.    Respiratory: Negative.     Cardiovascular: Negative.    Gastrointestinal: Negative.    Genitourinary: Negative.    Musculoskeletal: Negative.    Neurological: Negative.    Endo/Heme/Allergies: Negative.    Psychiatric/Behavioral:  Positive for depression. The patient is nervous/anxious.

## 2024-02-05 ENCOUNTER — MYC MEDICAL ADVICE (OUTPATIENT)
Dept: OTOLARYNGOLOGY | Facility: CLINIC | Age: 69
End: 2024-02-05
Payer: COMMERCIAL

## 2024-02-05 ENCOUNTER — PATIENT OUTREACH (OUTPATIENT)
Dept: ONCOLOGY | Facility: CLINIC | Age: 69
End: 2024-02-05
Payer: COMMERCIAL

## 2024-02-05 NOTE — PROGRESS NOTES
THORACIC SURGERY - NEW PATIENT OFFICE VISIT      Dear NP Mariza,     I saw Opal Benitez in consultation for the evaluation and treatment of a nodule of the LEFT upper lobe.    HPI  Mr. Opal Benitez is a 68 year old male patient with history of sinonasal SCC (within a previous inverted papilloma), now with residual or recurrent local sinus mass. On whole body PET, there is also a left apical subpleural nodule with mild uptake. He was referred to thoracic surgery for evaluation. He has lost 5 pounds unintentionally over the last month.  He walks 4-6 miles daily and does strength training and running 5 or 6 days per week.    ECOG performance status  0- Fully active, without restriction                 Previsit Tests   CT scan (12/29/23): LEFT upper lobe 1.1cm pulmonary Nodule, without mediastinal adenopathy, with no pleural effusion       PET scan (12/29/23): FDG avid 1.1cm LEFT upper lobe Nodule (Max SUV 2).     Covid vaccination status: Vaccinated    PMH  Reviewed, as below    Past Medical History:   Diagnosis Date    Unspecified essential hypertension     Essential hypertension        PSH  Reviewed, as below    Past Surgical History:   Procedure Laterality Date    COLOGUARD(EXACT SCIENCES)  12/10/2018    COLOGUARD(EXACT SCIENCES)  11/18/2015    CYSTOSCOPY  2000    OPTICAL TRACKING SYSTEM ENDOSCOPIC SINUS SURGERY N/A 1/26/2024    Procedure: Stealth guided transnasal approach to resect sinonasal tumor with Rivero-Chito approach.;  Surgeon: Karina Kumar MD;  Location:  OR        No Known Allergies    Current Outpatient Medications   Medication    amoxicillin-clavulanate (AUGMENTIN) 875-125 MG tablet    chlorhexidine (PERIDEX) 0.12 % solution    dapagliflozin (FARXIGA) 10 MG TABS tablet    fish oil-omega-3 fatty acids 1000 MG capsule    lisinopril-hydrochlorothiazide (ZESTORETIC) 20-12.5 MG tablet    metFORMIN (GLUCOPHAGE XR) 500 MG 24 hr tablet    pravastatin (PRAVACHOL) 40 MG tablet    sodium  "chloride (OCEAN) 0.65 % nasal spray     No current facility-administered medications for this visit.       ETOH: 5 days per week, 1 can of beer  TOBACCO: Never  OTHER DRUGS: Denies    Physical examination  BP (!) 157/80   Pulse 99   Temp 98  F (36.7  C) (Oral)   Resp 16   Ht 1.702 m (5' 7\")   Wt 74.6 kg (164 lb 8 oz)   SpO2 100%   BMI 25.76 kg/m     Physical Exam  Constitutional:       Appearance: Normal appearance. He is normal weight.   Eyes:      Conjunctiva/sclera: Conjunctivae normal.   Cardiovascular:      Rate and Rhythm: Normal rate.   Pulmonary:      Effort: Pulmonary effort is normal.   Skin:     General: Skin is warm and dry.   Neurological:      Mental Status: He is alert and oriented to person, place, and time.   Psychiatric:         Mood and Affect: Mood normal.         Behavior: Behavior normal.         Thought Content: Thought content normal.         Judgment: Judgment normal.          From a personal perspective, he is a  ([employer] at [employer]. He lives with his wife. They have two children.     IMPRESSION   68 year old male patient with LEFT upper lobe indeterminate pulmonary nodule. He is a good surgical candidate.    Stage: Indeterminate pulmonary lesion, IF this were a cancer, clinical stage IV (if related to head and neck cancer) or IA2(if other than squamous cell carcinoma).    PLAN  I spent 45 min on the date of the encounter in chart review, patient visit, review of tests, documentation and/or discussion with other providers about the issues documented above. I reviewed the plan as follows:    Procedure planned: Left thoracoscopic wedge resection    I discussed the risks and benefits of the operation, including obtaining a diagnosis, resecting and staging the cancer. The risks include bleeding, infection, prolonged air leak, deep venous thrombosis and pulmonary embolism, and death.  There is also a risk of prolonged pain, which could require " further treatment.  Prolonged air leak could be treated with bronchoscopy and endobronchial valve insertion  or going home with a chest tube.  Postoperative bleeding (rare) could require a return to the OR.     Necessary Preop Tests & Appointments: Preoperative assessment clinic, Pulmonary function testing, and CT Chest    Regional Anesthesia Plan: Intraoperative intercostal nerve block    Anticoagulation Plan: Prophylactic Lovenox      I appreciate the opportunity to participate in the care of your patient and will keep you updated.    Sincerely,    Cornelio Choi MD     Physician Attestation   I, Bernardino Singer MD, saw this patient and agree with the findings and plan of care as documented in the note.      Items personally reviewed/procedural attestation: vitals, labs, and imaging and agree with the interpretation documented in the note.    eBrnardino Singer MD

## 2024-02-06 ENCOUNTER — PRE VISIT (OUTPATIENT)
Dept: ONCOLOGY | Facility: CLINIC | Age: 69
End: 2024-02-06
Payer: COMMERCIAL

## 2024-02-06 ENCOUNTER — ONCOLOGY VISIT (OUTPATIENT)
Dept: SURGERY | Facility: CLINIC | Age: 69
End: 2024-02-06
Attending: OTOLARYNGOLOGY
Payer: COMMERCIAL

## 2024-02-06 VITALS
SYSTOLIC BLOOD PRESSURE: 157 MMHG | HEART RATE: 99 BPM | HEIGHT: 67 IN | RESPIRATION RATE: 16 BRPM | TEMPERATURE: 98 F | BODY MASS INDEX: 25.82 KG/M2 | OXYGEN SATURATION: 100 % | DIASTOLIC BLOOD PRESSURE: 80 MMHG | WEIGHT: 164.5 LBS

## 2024-02-06 DIAGNOSIS — C30.0 SQUAMOUS CELL CARCINOMA OF NASAL CAVITY (H): ICD-10-CM

## 2024-02-06 DIAGNOSIS — R91.1 NODULE OF APEX OF LEFT LUNG: Primary | ICD-10-CM

## 2024-02-06 LAB
PATH REPORT.COMMENTS IMP SPEC: NORMAL
PATH REPORT.COMMENTS IMP SPEC: NORMAL
PATH REPORT.FINAL DX SPEC: NORMAL
PATH REPORT.GROSS SPEC: NORMAL
PATH REPORT.INTRAOP OBS SPEC DOC: NORMAL
PATH REPORT.MICROSCOPIC SPEC OTHER STN: NORMAL
PATH REPORT.RELEVANT HX SPEC: NORMAL
PATHOLOGY SYNOPTIC REPORT: NORMAL
PHOTO IMAGE: NORMAL

## 2024-02-06 PROCEDURE — 99204 OFFICE O/P NEW MOD 45 MIN: CPT | Mod: 24 | Performed by: THORACIC SURGERY (CARDIOTHORACIC VASCULAR SURGERY)

## 2024-02-06 PROCEDURE — 99213 OFFICE O/P EST LOW 20 MIN: CPT | Performed by: THORACIC SURGERY (CARDIOTHORACIC VASCULAR SURGERY)

## 2024-02-06 ASSESSMENT — PAIN SCALES - GENERAL: PAINLEVEL: NO PAIN (0)

## 2024-02-06 NOTE — LETTER
2/6/2024         RE: Opal Benitez  [address] [facility] [address]        Dear Colleague,    Thank you for referring your patient, Opal Benitez, to the [facility] CANCER [facility]. Please see a copy of my visit note below.    THORACIC SURGERY - NEW PATIENT OFFICE VISIT      Dear ANTHONY Dawkins,     I saw Opal Benitez in consultation for the evaluation and treatment of a nodule of the LEFT upper lobe.    HPI  Mr. Opal Benitez is a 68 year old male patient with history of sinonasal SCC (within a previous inverted papilloma), now with residual or recurrent local sinus mass. On whole body PET, there is also a left apical subpleural nodule with mild uptake. He was referred to thoracic surgery for evaluation. He has lost 5 pounds unintentionally over the last month.  He walks 4-6 miles daily and does strength training and running 5 or 6 days per week.    ECOG performance status  0- Fully active, without restriction                 Previsit Tests   CT scan (12/29/23): LEFT upper lobe 1.1cm pulmonary Nodule, without mediastinal adenopathy, with no pleural effusion       PET scan (12/29/23): FDG avid 1.1cm LEFT upper lobe Nodule (Max SUV 2).     Covid vaccination status: Vaccinated    PMH  Reviewed, as below    Past Medical History:   Diagnosis Date    Unspecified essential hypertension     Essential hypertension        PSH  Reviewed, as below    Past Surgical History:   Procedure Laterality Date    COLOGUARD(EXACT SCIENCES)  12/10/2018    COLOGUARD(EXACT SCIENCES)  11/18/2015    CYSTOSCOPY  2000    OPTICAL TRACKING SYSTEM ENDOSCOPIC SINUS SURGERY N/A 1/26/2024    Procedure: Stealth guided transnasal approach to resect sinonasal tumor with Rivero-Chito approach.;  Surgeon: Karina Kumar MD;  Location[facility]        No Known Allergies    Current Outpatient Medications   Medication    amoxicillin-clavulanate (AUGMENTIN) 875-125 MG tablet    chlorhexidine (PERIDEX) 0.12 % solution     "dapagliflozin (FARXIGA) 10 MG TABS tablet    fish oil-omega-3 fatty acids 1000 MG capsule    lisinopril-hydrochlorothiazide (ZESTORETIC) 20-12.5 MG tablet    metFORMIN (GLUCOPHAGE XR) 500 MG 24 hr tablet    pravastatin (PRAVACHOL) 40 MG tablet    sodium chloride (OCEAN) 0.65 % nasal spray     No current facility-administered medications for this visit.       ETOH: 5 days per week, 1 can of beer  TOBACCO: Never  OTHER DRUGS: Denies    Physical examination  BP (!) 157/80   Pulse 99   Temp 98  F (36.7  C) (Oral)   Resp 16   Ht 1.702 m (5' 7\")   Wt 74.6 kg (164 lb 8 oz)   SpO2 100%   BMI 25.76 kg/m     Physical Exam  Constitutional:       Appearance: Normal appearance. He is normal weight.   Eyes:      Conjunctiva/sclera: Conjunctivae normal.   Cardiovascular:      Rate and Rhythm: Normal rate.   Pulmonary:      Effort: Pulmonary effort is normal.   Skin:     General: Skin is warm and dry.   Neurological:      Mental Status: He is alert and oriented to person, place, and time.   Psychiatric:         Mood and Affect: Mood normal.         Behavior: Behavior normal.         Thought Content: Thought content normal.         Judgment: Judgment normal.          From a personal perspective, he is a  ([employer]) at [employer]. He lives with his wife. They have two children.     IMPRESSION   68 year old male patient with LEFT upper lobe indeterminate pulmonary nodule. He is a good surgical candidate.    Stage: Indeterminate pulmonary lesion, IF this were a cancer, clinical stage IV (if related to head and neck cancer) or IA2(if other than squamous cell carcinoma).    PLAN  I spent 45 min on the date of the encounter in chart review, patient visit, review of tests, documentation and/or discussion with other providers about the issues documented above. I reviewed the plan as follows:    Procedure planned: Left thoracoscopic wedge resection    I discussed the risks and benefits of the " operation, including obtaining a diagnosis, resecting and staging the cancer. The risks include bleeding, infection, prolonged air leak, deep venous thrombosis and pulmonary embolism, and death.  There is also a risk of prolonged pain, which could require further treatment.  Prolonged air leak could be treated with bronchoscopy and endobronchial valve insertion  or going home with a chest tube.  Postoperative bleeding (rare) could require a return to the OR.     Necessary Preop Tests & Appointments: Preoperative assessment clinic, Pulmonary function testing, and CT Chest    Regional Anesthesia Plan: Intraoperative intercostal nerve block    Anticoagulation Plan: Prophylactic Lovenox      I appreciate the opportunity to participate in the care of your patient and will keep you updated.    Sincerely,    Cornelio Choi MD     Physician Attestation  I, Bernardino Singer MD, saw this patient and agree with the findings and plan of care as documented in the note.      Items personally reviewed/procedural attestation: vitals, labs, and imaging and agree with the interpretation documented in the note.    Bernardino Singer MD

## 2024-02-06 NOTE — NURSING NOTE
"Oncology Rooming Note    February 6, 2024 5:23 PM   Opal Benitez is a 68 year old male who presents for:    Chief Complaint   Patient presents with    Oncology Clinic Visit     Squamous cell carcinoma of nasal cavity      Initial Vitals: BP (!) 157/80   Pulse 99   Temp 98  F (36.7  C) (Oral)   Resp 16   Ht 1.702 m (5' 7\")   Wt 74.6 kg (164 lb 8 oz)   SpO2 100%   BMI 25.76 kg/m   Estimated body mass index is 25.76 kg/m  as calculated from the following:    Height as of this encounter: 1.702 m (5' 7\").    Weight as of this encounter: 74.6 kg (164 lb 8 oz). Body surface area is 1.88 meters squared.  No Pain (0) Comment: Data Unavailable   No LMP for male patient.  Allergies reviewed: Yes  Medications reviewed: Yes    Medications: Medication refills not needed today.  Pharmacy name entered into EPIC:    [facility]  - [place], [place] - [address] [id number] [place], [place] - [address] [facility]    Frailty Screening:   Is the patient here for a new oncology consult visit in cancer care? 2. No      Clinical concerns: None       Georgina Barrow CMA            "

## 2024-02-09 ENCOUNTER — PREP FOR PROCEDURE (OUTPATIENT)
Dept: SURGERY | Facility: CLINIC | Age: 69
End: 2024-02-09
Payer: COMMERCIAL

## 2024-02-09 DIAGNOSIS — C78.02 MALIGNANT NEOPLASM METASTATIC TO LEFT LUNG (H): Primary | ICD-10-CM

## 2024-02-09 RX ORDER — ENOXAPARIN SODIUM 100 MG/ML
40 INJECTION SUBCUTANEOUS
OUTPATIENT
Start: 2024-02-09

## 2024-02-12 NOTE — PROGRESS NOTES
Mr. 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    He is here today for treatment planning simulation.    He was initially seen in consultation on 2/2/2024 at which time final pathology was pending.    Final pathology ( 3115) from 1/26/2024   Final Diagnosis  A.SINUS CONTENTS, ETHMOID AND RIGHT INFERIOR TURBINATE:  - Benign respiratory mucosa and turbinate with non-specific chronic inflammation and fibrosis  - No evidence of high grade dysplasia/carcinoma    B. RIGHT SPHENOID SINUS, BIOPSY:  - Negative for high grade dysplasia/carcinoma    C. RIGHT ETHMOID, BIOPSY:  - Negative for high grade dysplasia/carcinoma    D. SINUS CONTENTS, RIGHT POSTERIOR ETHMOID:  - Fragments of inverted squamous papilloma  - Negative for high grade dysplasia/carcinoma    E. SINUS CONTENTS, RIGHT ANTERIOR ETHMOID:  - Fragments of inverted squamous papilloma  - No evidence of high grade dysplasia/carcinoma    F. RIGHT INFRA ORBITAL NERVE, BIOPSY:  - Nerve and bone tissue, negative for carcinoma    G. MAXILLARY SINUS, RIGHT, SIMPLE EXCISION:  - SQUAMOUS CELL CARCINOMA, FOCALLY INVASIVE in a background of granulation tissue and fibrosis  - No perineural or lymphovascular invasion is seen  - Peripheral and deep resection margins are negative  - AJCC Pathologic Stage: pT1    H. ORBITAL FLOOR BONE, BIOPSY:  - Negative for carcinoma    I. SINUS CONTENT, RIGHT ETHMOID AND TURBINATE:  - Fragment of inverted squamous papilloma  - Negative for high grade dysplasia/carcinoma    J. RIGHT MAXILLARY SINUS, POSTERIOR BONY WALL:  - Negative for carcinoma    Synoptic Checklist  NASAL CAVITY AND PARANASAL SINUSES (NASAL CAVITY AND PARANASAL SINUSES - All Specimens)  8th Edition - Protocol posted: 6/21/2023  SPECIMEN  Procedure Excision  .  TUMOR  Tumor Focality Unifocal  Tumor Site Paranasal sinus(es), maxillary  Tumor Laterality Right  Tumor Size Greatest  Dimension (Centimeters): ~ 0.5 cm  Histologic Type Squamous cell carcinoma, nonkeratinizing  Histologic Grade G2, moderately differentiated  Lymphatic and / or Vascular Invasion Not Identified  Perineural Invasion Not identified  .  MARGINS  Margin Status for Invasive Tumor All margins negative for invasive tumor  Distance from Invasive Tumor to Closest Margin Greater than: 10 mm  Closest Margin(s) to Invasive Tumor peripheral  Margin Status for Noninvasive Tumor (High-grade  Dysplasia)  All margins negative for high-grade dysplasia / in situ  disease  .  REGIONAL LYMPH NODES  Regional Lymph Node Status Not applicable (no regional lymph nodes submitted or  found)          Today, we did discuss his lung nodule biopsy.  He has met with Dr. Singer and is considering holding off on lung biopsy until after he has completed his course of adjuvant radiation out of concern for being able to tolerate the procedure and proceed with radiation simultaneously.  We did discuss that his planning CT scan from today will again show the left apical pulmonary nodule and we can measure any size difference between the scan from today and his previous PET CT scan on 12/29/2023.    PLAN:  Proceed with treatment planning simulation with plans to initiate therapy on 2/26/2024.  Given negative margins on final specimen, plan to deliver 6000 cGy in 30 fractions to the paranasal sinus and right cervical nodes with IMRT.  Will discuss timing of lung nodule biopsy with Dr. Singer.    Johana Galeas MD    CC  Patient Care Team:  Aiden Drummond MD as PCP - General (Internal Medicine)  Aiden Drummond MD as Assigned PCP  Karina Kumar MD as MD (Otolaryngology)  Karina Kumar MD as Assigned Surgical Provider  Johana Galeas MD as MD (Radiation Oncology)  Bernardino Singer MD as MD (Cardiovascular & Thoracic Surgery)

## 2024-02-13 ENCOUNTER — HOSPITAL ENCOUNTER (OUTPATIENT)
Dept: CT IMAGING | Facility: CLINIC | Age: 69
Discharge: HOME | End: 2024-02-13
Attending: RADIOLOGY | Admitting: RADIOLOGY
Payer: COMMERCIAL

## 2024-02-13 ENCOUNTER — OFFICE VISIT (OUTPATIENT)
Dept: RADIATION ONCOLOGY | Facility: CLINIC | Age: 69
End: 2024-02-13
Attending: OTOLARYNGOLOGY
Payer: COMMERCIAL

## 2024-02-13 DIAGNOSIS — C31.9 MALIGNANT NEOPLASM OF PARANASAL SINUS (H): ICD-10-CM

## 2024-02-13 DIAGNOSIS — C31.9 MALIGNANT NEOPLASM OF PARANASAL SINUS (H): Primary | ICD-10-CM

## 2024-02-13 PROCEDURE — 77334 RADIATION TREATMENT AID(S): CPT | Performed by: RADIOLOGY

## 2024-02-13 PROCEDURE — 77334 RADIATION TREATMENT AID(S): CPT | Mod: 26 | Performed by: RADIOLOGY

## 2024-02-13 PROCEDURE — 250N000011 HC RX IP 250 OP 636: Performed by: RADIOLOGY

## 2024-02-13 PROCEDURE — 77014 CT THERAPY CORRELATE: CPT | Mod: TC

## 2024-02-13 RX ORDER — ALBUTEROL SULFATE 0.83 MG/ML
2.5 SOLUTION RESPIRATORY (INHALATION) ONCE
Status: COMPLETED | OUTPATIENT
Start: 2024-02-14 | End: 2024-02-14

## 2024-02-13 RX ORDER — IOPAMIDOL 755 MG/ML
67 INJECTION, SOLUTION INTRAVASCULAR ONCE
Status: COMPLETED | OUTPATIENT
Start: 2024-02-13 | End: 2024-02-13

## 2024-02-13 RX ADMIN — IOPAMIDOL 67 ML: 755 INJECTION, SOLUTION INTRAVENOUS at 14:43

## 2024-02-13 NOTE — PROGRESS NOTES
Radiation Therapy Patient Education    Person involved with teaching: Patient    Patient educational needs for self management of treatment-related side effects assessment completed.  [facility] Patient Ed tab contains Patient Learning Assessment    Education Materials Given  Radiation Therapy for Head and Neck    Educational Topics Discussed  Side effects expected, Pain management, Skin care, Nutrition and weight loss, and When to call MD/RN    Response To Teaching  Verbalizes understanding    GYN Only  Vaginal Dilator-given and educated: N/A    Referrals sent: Dental, Speech and Swallowing, and Nutrition    Chemotherapy?  No

## 2024-02-13 NOTE — LETTER
2/13/2024         RE: Opal Benitez  [address] [facility] [address]        Dear Colleague,    Thank you for referring your patient, Opal Benitez, to the [facility] RADIATION ONCOLOGY. Please see a copy of my visit note below.    Mr. 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    He is here today for treatment planning simulation.    He was initially seen in consultation on 2/2/2024 at which time final pathology was pending.    Final pathology ( [id number]) from 1/26/2024   Final Diagnosis  A.SINUS CONTENTS, ETHMOID AND RIGHT INFERIOR TURBINATE:  - Benign respiratory mucosa and turbinate with non-specific chronic inflammation and fibrosis  - No evidence of high grade dysplasia/carcinoma    B. RIGHT SPHENOID SINUS, BIOPSY:  - Negative for high grade dysplasia/carcinoma    C. RIGHT ETHMOID, BIOPSY:  - Negative for high grade dysplasia/carcinoma    D. SINUS CONTENTS, RIGHT POSTERIOR ETHMOID:  - Fragments of inverted squamous papilloma  - Negative for high grade dysplasia/carcinoma    E. SINUS CONTENTS, RIGHT ANTERIOR ETHMOID:  - Fragments of inverted squamous papilloma  - No evidence of high grade dysplasia/carcinoma    F. RIGHT INFRA ORBITAL NERVE, BIOPSY:  - Nerve and bone tissue, negative for carcinoma    G. MAXILLARY SINUS, RIGHT, SIMPLE EXCISION:  - SQUAMOUS CELL CARCINOMA, FOCALLY INVASIVE in a background of granulation tissue and fibrosis  - No perineural or lymphovascular invasion is seen  - Peripheral and deep resection margins are negative  - AJCC Pathologic Stage: pT1    H. ORBITAL FLOOR BONE, BIOPSY:  - Negative for carcinoma    I. SINUS CONTENT, RIGHT ETHMOID AND TURBINATE:  - Fragment of inverted squamous papilloma  - Negative for high grade dysplasia/carcinoma    J. RIGHT MAXILLARY SINUS, POSTERIOR BONY WALL:  - Negative for carcinoma    Synoptic Checklist  NASAL CAVITY AND PARANASAL  SINUSES (NASAL CAVITY AND PARANASAL SINUSES - All Specimens)  8th Edition - Protocol posted: 6/21/2023  SPECIMEN  Procedure Excision  .  TUMOR  Tumor Focality Unifocal  Tumor Site Paranasal sinus(es), maxillary  Tumor Laterality Right  Tumor Size Greatest Dimension (Centimeters): ~ 0.5 cm  Histologic Type Squamous cell carcinoma, nonkeratinizing  Histologic Grade G2, moderately differentiated  Lymphatic and / or Vascular Invasion Not Identified  Perineural Invasion Not identified  .  MARGINS  Margin Status for Invasive Tumor All margins negative for invasive tumor  Distance from Invasive Tumor to Closest Margin Greater than: 10 mm  Closest Margin(s) to Invasive Tumor peripheral  Margin Status for Noninvasive Tumor (High-grade  Dysplasia)  All margins negative for high-grade dysplasia / in situ  disease  .  REGIONAL LYMPH NODES  Regional Lymph Node Status Not applicable (no regional lymph nodes submitted or  found)          Today, we did discuss his lung nodule biopsy.  He has met with Dr. Singer and is considering holding off on lung biopsy until after he has completed his course of adjuvant radiation out of concern for being able to tolerate the procedure and proceed with radiation simultaneously.  We did discuss that his planning CT scan from today will again show the left apical pulmonary nodule and we can measure any size difference between the scan from today and his previous PET CT scan on 12/29/2023.    PLAN:  Proceed with treatment planning simulation with plans to initiate therapy on 2/26/2024.  Given negative margins on final specimen, plan to deliver 6000 cGy in 30 fractions to the paranasal sinus and right cervical nodes with IMRT.  Will discuss timing of lung nodule biopsy with Dr. Singer.    Johana Galeas MD    CC  Patient Care Team:  Aiden Drummond MD as PCP - General (Internal Medicine)  Aiden Drummond MD as Assigned PCP  Karina Kumar MD as MD  (Otolaryngology)  Karina Kumar MD as Assigned Surgical Provider  Johana Galeas MD as MD (Radiation Oncology)  Bernardino Singer MD as MD (Cardiovascular & Thoracic Surgery)      Radiation Therapy Patient Education    Person involved with teaching: Patient    Patient educational needs for self management of treatment-related side effects assessment completed.  EP[facility] Patient Ed tab contains Patient Learning Assessment    Education Materials Given  Radiation Therapy for Head and Neck    Educational Topics Discussed  Side effects expected, Pain management, Skin care, Nutrition and weight loss, and When to call MD/RN    Response To Teaching  Verbalizes understanding    GYN Only  Vaginal Dilator-given and educated: N/A    Referrals sent: Dental, Speech and Swallowing, and Nutrition    Chemotherapy?  No         Again, thank you for allowing me to participate in the care of your patient.        Sincerely,        Johana Galeas MD

## 2024-02-14 ENCOUNTER — HOSPITAL ENCOUNTER (OUTPATIENT)
Dept: RESPIRATORY THERAPY | Facility: CLINIC | Age: 69
Discharge: HOME | End: 2024-02-14
Attending: CLINICAL NURSE SPECIALIST | Admitting: CLINICAL NURSE SPECIALIST
Payer: COMMERCIAL

## 2024-02-14 DIAGNOSIS — R91.1 NODULE OF APEX OF LEFT LUNG: ICD-10-CM

## 2024-02-14 LAB
DLCOCOR-%PRED-PRE: 94 %
DLCOCOR-PRE: 22.98 ML/MIN/MMHG
DLCOUNC-%PRED-PRE: 89 %
DLCOUNC-PRE: 21.65 ML/MIN/MMHG
DLCOUNC-PRED: 24.26 ML/MIN/MMHG
ERV-%PRED-PRE: 32 %
ERV-PRE: 0.44 L
ERV-PRED: 1.34 L
EXPTIME-PRE: 4.64 SEC
FEF2575-%PRED-POST: 204 %
FEF2575-%PRED-PRE: 257 %
FEF2575-POST: 4.74 L/SEC
FEF2575-PRE: 5.98 L/SEC
FEF2575-PRED: 2.32 L/SEC
FEFMAX-%PRED-PRE: 134 %
FEFMAX-PRE: 10.81 L/SEC
FEFMAX-PRED: 8.04 L/SEC
FEV1-%PRED-PRE: 113 %
FEV1-PRE: 3.21 L
FEV1FEV6-PRE: 91 %
FEV1FEV6-PRED: 78 %
FEV1FVC-PRE: 92 %
FEV1FVC-PRED: 78 %
FEV1SVC-PRE: 102 %
FEV1SVC-PRED: 71 %
FIFMAX-PRE: 7.15 L/SEC
FRCPLETH-%PRED-PRE: 82 %
FRCPLETH-PRE: 2.89 L
FRCPLETH-PRED: 3.49 L
FVC-%PRED-PRE: 95 %
FVC-PRE: 3.49 L
FVC-PRED: 3.64 L
IC-%PRED-PRE: 100 %
IC-PRE: 2.69 L
IC-PRED: 2.68 L
RVPLETH-%PRED-PRE: 99 %
RVPLETH-PRE: 2.45 L
RVPLETH-PRED: 2.46 L
TLCPLETH-%PRED-PRE: 85 %
TLCPLETH-PRE: 5.58 L
TLCPLETH-PRED: 6.52 L
VA-%PRED-PRE: 90 %
VA-PRE: 5.25 L
VC-%PRED-PRE: 78 %
VC-PRE: 3.13 L
VC-PRED: 3.98 L

## 2024-02-14 PROCEDURE — 94060 EVALUATION OF WHEEZING: CPT

## 2024-02-14 PROCEDURE — 94060 EVALUATION OF WHEEZING: CPT | Mod: 26 | Performed by: INTERNAL MEDICINE

## 2024-02-14 PROCEDURE — 250N000009 HC RX 250: Performed by: CLINICAL NURSE SPECIALIST

## 2024-02-14 PROCEDURE — 94729 DIFFUSING CAPACITY: CPT | Mod: 26 | Performed by: INTERNAL MEDICINE

## 2024-02-14 PROCEDURE — 94729 DIFFUSING CAPACITY: CPT

## 2024-02-14 PROCEDURE — 94726 PLETHYSMOGRAPHY LUNG VOLUMES: CPT

## 2024-02-14 PROCEDURE — 999N000157 HC STATISTIC RCP TIME EA 10 MIN

## 2024-02-14 PROCEDURE — 94726 PLETHYSMOGRAPHY LUNG VOLUMES: CPT | Mod: 26 | Performed by: INTERNAL MEDICINE

## 2024-02-14 RX ADMIN — ALBUTEROL SULFATE 2.5 MG: 2.5 SOLUTION RESPIRATORY (INHALATION) at 09:03

## 2024-02-14 NOTE — PROGRESS NOTES
RESPIRATORY CARE NOTE    Complete PFT done.The results of testing meet ATS critieria for acceptability & repeatability except DLCO maneuver.   Patient was unable to inhale to >90% of Vital Capacity for DLCO testing on each maneuver but 2 picked seem repeatable, but be guarded on results. Good patient effort and cooperation.   Patient was given Albuterol 2.5 mg via nebulizer prior to Post-BD testing.  Patient left in no distress.  Kaitlin Cole, RT  2/14/2024

## 2024-02-21 ENCOUNTER — TELEPHONE (OUTPATIENT)
Dept: RADIATION ONCOLOGY | Facility: CLINIC | Age: 69
End: 2024-02-21
Payer: COMMERCIAL

## 2024-02-27 ENCOUNTER — TELEPHONE (OUTPATIENT)
Dept: OTOLARYNGOLOGY | Facility: CLINIC | Age: 69
End: 2024-02-27
Payer: COMMERCIAL

## 2024-03-04 ENCOUNTER — PATIENT OUTREACH (OUTPATIENT)
Dept: SURGERY | Facility: CLINIC | Age: 69
End: 2024-03-04
Payer: COMMERCIAL

## 2024-03-04 NOTE — PROGRESS NOTES
"Patient was transferred to writer, he had questions regarding the timeline of his treatment and when he would be having surgery with Dr. Singer. Writer explained to patient that Dr. Singer and Dr. Galeas have spoken to each other and they both agreed that he should have the radiation for his head and neck cancer first and then he will have the lung surgery. He is concerned that his PET Scan will be pushed out to June because of his radiation schedule and wanted to know that Dr. Singer approved of this. Writer explained again that he spoke with Dr. Galeas and they both agreed on the treatment plan. Opal was relieved and will call if he has any further questions or concerns. He has writer's direct contact information. He pronounces his name \"reid vo\".  "

## 2024-03-05 NOTE — PATIENT INSTRUCTIONS
1. Please follow-up in clinic 6 weeks after radiation therapy.   2. Please call the ENT clinic with any questions,concerns, new or worsening symptoms.    -Clinic number is [phone]   - Peg's direct line (Dr. Cole's nurse) [phone]

## 2024-03-07 ENCOUNTER — OFFICE VISIT (OUTPATIENT)
Dept: OTOLARYNGOLOGY | Facility: CLINIC | Age: 69
End: 2024-03-07
Payer: COMMERCIAL

## 2024-03-07 VITALS
HEIGHT: 67 IN | WEIGHT: 164 LBS | HEART RATE: 116 BPM | SYSTOLIC BLOOD PRESSURE: 139 MMHG | DIASTOLIC BLOOD PRESSURE: 82 MMHG | BODY MASS INDEX: 25.74 KG/M2

## 2024-03-07 DIAGNOSIS — C31.9 MALIGNANT NEOPLASM OF ACCESSORY SINUS (H): Primary | ICD-10-CM

## 2024-03-07 PROCEDURE — 31237 NSL/SINS NDSC SURG BX POLYPC: CPT | Mod: RT | Performed by: OTOLARYNGOLOGY

## 2024-03-07 NOTE — PROGRESS NOTES
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 Currently receiving radiation therapy       Path Result 01/26/2024  Final Diagnosis   A.SINUS CONTENTS, ETHMOID AND RIGHT INFERIOR TURBINATE:  - Benign respiratory mucosa and turbinate with non-specific chronic inflammation and fibrosis  - No evidence of high grade dysplasia/carcinoma     B. RIGHT SPHENOID SINUS, BIOPSY:  - Negative for high grade dysplasia/carcinoma     C. RIGHT ETHMOID, BIOPSY:  - Negative for high grade dysplasia/carcinoma     D. SINUS CONTENTS, RIGHT POSTERIOR ETHMOID:  - Fragments of inverted squamous papilloma  - Negative for high grade dysplasia/carcinoma     E. SINUS CONTENTS, RIGHT ANTERIOR ETHMOID:  - Fragments of inverted squamous papilloma  - No evidence of high grade dysplasia/carcinoma     F. RIGHT INFRA ORBITAL NERVE, BIOPSY:  - Nerve and bone tissue, negative for carcinoma     G. MAXILLARY SINUS, RIGHT, SIMPLE EXCISION:  - SQUAMOUS CELL CARCINOMA, FOCALLY INVASIVE in a background of granulation tissue and fibrosis  - No perineural or lymphovascular invasion is seen  - Peripheral and deep resection margins are negative  - AJCC Pathologic Stage: pT1     H. ORBITAL FLOOR BONE, BIOPSY:  - Negative for carcinoma     I. SINUS CONTENT, RIGHT ETHMOID AND TURBINATE:  - Fragment of inverted squamous papilloma  - Negative for high grade dysplasia/carcinoma     J. RIGHT MAXILLARY SINUS, POSTERIOR BONY WALL:  - Negative for carcinoma        History of Present Illness: 68-year-old male here in the otolaryngology clinic for follow-up.  Patient had multiple questions today we discussed these questions in detail.  His main question was regarding his thinking.  I did personally review all the images, pathology report as well as the NCCN guidelines for ethmoid and sinonasal tumors.  Patient reports that he is scheduled to  start radiation therapy next week.  He continues to have some right-sided sinonasal crust.  He denies any facial pain.    MEDICATIONS:     Current Outpatient Medications   Medication Sig Dispense Refill    dapagliflozin (FARXIGA) 10 MG TABS tablet Take 1 tablet (10 mg) by mouth daily 90 tablet 3    fish oil-omega-3 fatty acids 1000 MG capsule Take 2 g by mouth daily (Patient not taking: Reported on 2/6/2024)      lisinopril-hydrochlorothiazide (ZESTORETIC) 20-12.5 MG tablet Take 1 tablet by mouth daily 90 tablet 3    metFORMIN (GLUCOPHAGE XR) 500 MG 24 hr tablet Take 4 tablets (2,000 mg) by mouth daily 360 tablet 3    pravastatin (PRAVACHOL) 40 MG tablet Take 1 tablet (40 mg) by mouth daily 90 tablet 3    sodium chloride (OCEAN) 0.65 % nasal spray Spray two sprays into both sides of your nose every 3 hours while you are awake 88 mL 3       ALLERGIES:  No Known Allergies      PAST MEDICAL HISTORY:   Past Medical History:   Diagnosis Date    Unspecified essential hypertension     Essential hypertension        FAMILY HISTORY:    Family History   Problem Relation Age of Onset    Hypertension Father     Lung Cancer Father     Diabetes Type 2  Father     Diabetes No family hx of     Eye Disorder No family hx of        REVIEW OF SYSTEMS:  12 point ROS was negative other than the symptoms noted above in the HPI.    Nasal endoscopy: Consent for nasal endoscopy was obtained.  I confirmed correctness of the procedure and identity of the patient.  Nasal endoscopy was indicated due to right sinonasal carcinoma.  The nose was topically decongested and anesthetized.  The nasal endoscope was passed under endoscopic vision.  On the right side I remove abundant sinonasal crust.  There is a large maxillary antrostomy.  He evidence of recent surgery.  No evidence of gross masses.  Nasopharynx is normal.    IMPRESSION AND PLAN: 68-year-old gentleman with a right ethmoid sinonasal squamous cell carcinoma arising from inverted papilloma.   We had a good discussion today with the patient guarding his staging.  After reviewing the pathology report as well as images and operative report his stage is T3 N0 M0.  He is going to start radiation therapy next week.  I would like to see him back when he completes his radiation therapy.    Karina Kumar MD, M.S.  Otolaryngology- Head & Neck Surgery  [phone]

## 2024-03-07 NOTE — LETTER
3/7/2024       RE: Opal Benitez  [address] [facility] [address]     Dear Colleague,    Thank you for referring your patient, Opal Benitez, to the [facility] EAR NOSE AND THROAT CLINIC [place] [facility]. Please see a copy of my visit note below.    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 Currently receiving radiation therapy       Path Result 01/26/2024  Final Diagnosis   A.SINUS CONTENTS, ETHMOID AND RIGHT INFERIOR TURBINATE:  - Benign respiratory mucosa and turbinate with non-specific chronic inflammation and fibrosis  - No evidence of high grade dysplasia/carcinoma     B. RIGHT SPHENOID SINUS, BIOPSY:  - Negative for high grade dysplasia/carcinoma     C. RIGHT ETHMOID, BIOPSY:  - Negative for high grade dysplasia/carcinoma     D. SINUS CONTENTS, RIGHT POSTERIOR ETHMOID:  - Fragments of inverted squamous papilloma  - Negative for high grade dysplasia/carcinoma     E. SINUS CONTENTS, RIGHT ANTERIOR ETHMOID:  - Fragments of inverted squamous papilloma  - No evidence of high grade dysplasia/carcinoma     F. RIGHT INFRA ORBITAL NERVE, BIOPSY:  - Nerve and bone tissue, negative for carcinoma     G. MAXILLARY SINUS, RIGHT, SIMPLE EXCISION:  - SQUAMOUS CELL CARCINOMA, FOCALLY INVASIVE in a background of granulation tissue and fibrosis  - No perineural or lymphovascular invasion is seen  - Peripheral and deep resection margins are negative  - AJCC Pathologic Stage: pT1     H. ORBITAL FLOOR BONE, BIOPSY:  - Negative for carcinoma     I. SINUS CONTENT, RIGHT ETHMOID AND TURBINATE:  - Fragment of inverted squamous papilloma  - Negative for high grade dysplasia/carcinoma     J. RIGHT MAXILLARY SINUS, POSTERIOR BONY WALL:  - Negative for carcinoma        History of Present Illness: 68-year-old male here in the  otolaryngology clinic for follow-up.  Patient had multiple questions today we discussed these questions in detail.  His main question was regarding his thinking.  I did personally review all the images, pathology report as well as the NCCN guidelines for ethmoid and sinonasal tumors.  Patient reports that he is scheduled to start radiation therapy next week.  He continues to have some right-sided sinonasal crust.  He denies any facial pain.    MEDICATIONS:     Current Outpatient Medications   Medication Sig Dispense Refill     dapagliflozin (FARXIGA) 10 MG TABS tablet Take 1 tablet (10 mg) by mouth daily 90 tablet 3     fish oil-omega-3 fatty acids 1000 MG capsule Take 2 g by mouth daily (Patient not taking: Reported on 2/6/2024)       lisinopril-hydrochlorothiazide (ZESTORETIC) 20-12.5 MG tablet Take 1 tablet by mouth daily 90 tablet 3     metFORMIN (GLUCOPHAGE XR) 500 MG 24 hr tablet Take 4 tablets (2,000 mg) by mouth daily 360 tablet 3     pravastatin (PRAVACHOL) 40 MG tablet Take 1 tablet (40 mg) by mouth daily 90 tablet 3     sodium chloride (OCEAN) 0.65 % nasal spray Spray two sprays into both sides of your nose every 3 hours while you are awake 88 mL 3       ALLERGIES:  No Known Allergies      PAST MEDICAL HISTORY:   Past Medical History:   Diagnosis Date     Unspecified essential hypertension     Essential hypertension        FAMILY HISTORY:    Family History   Problem Relation Age of Onset     Hypertension Father      Lung Cancer Father      Diabetes Type 2  Father      Diabetes No family hx of      Eye Disorder No family hx of        REVIEW OF SYSTEMS:  12 point ROS was negative other than the symptoms noted above in the HPI.    Nasal endoscopy: Consent for nasal endoscopy was obtained.  I confirmed correctness of the procedure and identity of the patient.  Nasal endoscopy was indicated due to right sinonasal carcinoma.  The nose was topically decongested and anesthetized.  The nasal endoscope was passed  under endoscopic vision.  On the right side I remove abundant sinonasal crust.  There is a large maxillary antrostomy.  He evidence of recent surgery.  No evidence of gross masses.  Nasopharynx is normal.    IMPRESSION AND PLAN: 68-year-old gentleman with a right ethmoid sinonasal squamous cell carcinoma arising from inverted papilloma.  We had a good discussion today with the patient guarding his staging.  After reviewing the pathology report as well as images and operative report his stage is T3 N0 M0.  He is going to start radiation therapy next week.  I would like to see him back when he completes his radiation therapy.    Karina Kumar MD, M.S.  Otolaryngology- Head & Neck Surgery  [phone]

## 2024-03-11 ENCOUNTER — APPOINTMENT (OUTPATIENT)
Dept: RADIATION ONCOLOGY | Facility: CLINIC | Age: 69
End: 2024-03-11
Attending: RADIOLOGY
Payer: COMMERCIAL

## 2024-03-11 PROCEDURE — 77332 RADIATION TREATMENT AID(S): CPT | Mod: XU | Performed by: RADIOLOGY

## 2024-03-11 PROCEDURE — 77014 PR CT GUIDE FOR PLACEMENT RADIATION THERAPY FIELDS: CPT | Mod: 26 | Performed by: RADIOLOGY

## 2024-03-11 PROCEDURE — 77332 RADIATION TREATMENT AID(S): CPT | Mod: 26 | Performed by: RADIOLOGY

## 2024-03-12 ENCOUNTER — OFFICE VISIT (OUTPATIENT)
Dept: RADIATION ONCOLOGY | Facility: CLINIC | Age: 69
End: 2024-03-12
Attending: RADIOLOGY
Payer: COMMERCIAL

## 2024-03-12 VITALS
DIASTOLIC BLOOD PRESSURE: 96 MMHG | SYSTOLIC BLOOD PRESSURE: 139 MMHG | BODY MASS INDEX: 25.69 KG/M2 | HEART RATE: 75 BPM | WEIGHT: 164 LBS

## 2024-03-12 DIAGNOSIS — C31.9 MALIGNANT NEOPLASM OF PARANASAL SINUS (H): Primary | ICD-10-CM

## 2024-03-12 DIAGNOSIS — G89.3 CANCER RELATED PAIN: ICD-10-CM

## 2024-03-12 PROCEDURE — 77386 HC IMRT TREATMENT DELIVERY, COMPLEX: CPT | Performed by: RADIOLOGY

## 2024-03-12 RX ORDER — GABAPENTIN 300 MG/1
600 CAPSULE ORAL 3 TIMES DAILY
Qty: 270 CAPSULE | Refills: 4 | Status: SHIPPED | OUTPATIENT
Start: 2024-03-12 | End: 2024-06-17

## 2024-03-12 NOTE — LETTER
"    3/12/2024         RE: Opal Benitez  [address] [facility] [address]        Dear Colleague,    Thank you for referring your patient, Opal Benitez, to the [facility] RADIATION ONCOLOGY. Please see a copy of my visit note below.    RADIATION ONCOLOGY WEEKLY ON TREATMENT VISIT   Encounter Date: Mar 12, 2024    Patient Name: Opal Benitez  MRN: [id number]  : 1955     Disease and Stage: Right sinonasal squamous cell carcinoma, [id number], pT1N0  Treatment Site: H&N  Current Dose/Planned Total Dose: [400] cGy / [6000] cGy  Daily Fraction Size: [200] cGy/day, [5] times/week    Concurrent Chemotherapy: No  Drug and Frequency: N/A    Surgeon: Dr. Cole & Dr. Singer    Treatment Summary:  Mr. 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    Subjective: Mr. Benitez presents to clinic today for his weekly on-treatment visit. He has just started treatment and is not noting any acute toxicities. He has persistent post-surgical pain/irritation in his R sinus that is unchanged.    Nursing ROS:   Nutrition Alteration  Diet Type: Patient's Preference  Skin  Skin Reaction: 0 - No changes  Skin Note: Gabapentin     ENT and Mouth Exam  Mucositis - Current: 0 - None   Cardiovascular  Respiratory effort: 1 - Normal - without distress  Gastrointestinal  Nausea: 0 - None     Pain Assessment  0-10 Pain Scale: 0    PEG Tube: No  Electronic Cardiac Implant: No    Objective:   BP (!) 139/96   Pulse 75   Wt 74.4 kg (164 lb)   BMI 25.69 kg/m  , initial weight 74.4 kg (164 pounds)  Gen: Appears well, NAD  HEENT: No mucositis  Skin: No erythema    Laboratory:  No results found for: \"NTBNPI\", \"NTBNP\"  Lab Results   Component Value Date    WBC 7.4 2024    HGB 12.7 (L) 2024    HCT 45.8 2024    MCV 96 2024     2024     No results found for: \"MAG\"    Treatment-related " toxicities (CTCAE v5.0):  Anorexia: Grade 0: No toxicity  Fatigue: Grade 0: No toxicity  Headache: Grade 0: No toxicity  Nausea: Grade 0: No toxicity  Pain: Grade 1: Mild pain  Dry mouth: Grade 0: No toxicity  Dysphagia: Grade 0: No toxicity  Mucositis: Grade 0: No toxicity  Dermatitis: Grade 0: No toxicity    ED visits/Hospitalizations:  None    Missed Treatments:  None    Mosaiq chart and setup information reviewed  IGRT images reviewed    Medication Review  Med list reviewed with patient?: Yes  Med list printed and given: Offered and declined    Assessment:    Mr. 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 He is tolerating RT well    Plan:   1) Prescribed taper dose of gabapentin, goal 900 mg TID, to start with 300 mg daily  2) Ordered CT Chest to assess interval growth of MIKALA lesion since planning CT, ~ March 26th, to be scheduled  3) Reviewed expected treatment toxicities    Pamela Hartley MD MS PGY2  Discussed with Johana Galeas MD    Physician Attestation  Mr. Benitez was seen and examined by me. I agree with the findings and plan of care as documented in the note. Note above by Dr. Hartley was reviewed and edited by me and reflects our mutual findings and plan of care.  I personally reviewed the available treatment setup images and vital signs listed.     Johana Galeas MD  Department of Radiation Oncology  [facility]               no pressure ulcers

## 2024-03-12 NOTE — PROGRESS NOTES
"RADIATION ONCOLOGY WEEKLY ON TREATMENT VISIT   Encounter Date: Mar 12, 2024    Patient Name: Opal Benitez  MRN: [id number]  : 1955     Disease and Stage: Right sinonasal squamous cell carcinoma, [id number], pT1N0  Treatment Site: H&N  Current Dose/Planned Total Dose: [400] cGy / [6000] cGy  Daily Fraction Size: [200] cGy/day, [5] times/week    Concurrent Chemotherapy: No  Drug and Frequency: N/A    Surgeon: Dr. Cole & Dr. Singer    Treatment Summary:  Mr. 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    Subjective: Mr. Benitez presents to clinic today for his weekly on-treatment visit. He has just started treatment and is not noting any acute toxicities. He has persistent post-surgical pain/irritation in his R sinus that is unchanged.    Nursing ROS:   Nutrition Alteration  Diet Type: Patient's Preference  Skin  Skin Reaction: 0 - No changes  Skin Note: Gabapentin     ENT and Mouth Exam  Mucositis - Current: 0 - None   Cardiovascular  Respiratory effort: 1 - Normal - without distress  Gastrointestinal  Nausea: 0 - None     Pain Assessment  0-10 Pain Scale: 0    PEG Tube: No  Electronic Cardiac Implant: No    Objective:   BP (!) 139/96   Pulse 75   Wt 74.4 kg (164 lb)   BMI 25.69 kg/m  , initial weight 74.4 kg (164 pounds)  Gen: Appears well, NAD  HEENT: No mucositis  Skin: No erythema    Laboratory:  No results found for: \"NTBNPI\", \"NTBNP\"  Lab Results   Component Value Date    WBC 7.4 2024    HGB 12.7 (L) 2024    HCT 45.8 2024    MCV 96 2024     2024     No results found for: \"MAG\"    Treatment-related toxicities (CTCAE v5.0):  Anorexia: Grade 0: No toxicity  Fatigue: Grade 0: No toxicity  Headache: Grade 0: No toxicity  Nausea: Grade 0: No toxicity  Pain: Grade 1: Mild pain  Dry mouth: Grade 0: No toxicity  Dysphagia: Grade 0: No toxicity  Mucositis: " Grade 0: No toxicity  Dermatitis: Grade 0: No toxicity    ED visits/Hospitalizations:  None    Missed Treatments:  None    Mosaiq chart and setup information reviewed  IGRT images reviewed    Medication Review  Med list reviewed with patient?: Yes  Med list printed and given: Offered and declined    Assessment:    Mr. 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 He is tolerating RT well    Plan:   1) Prescribed taper dose of gabapentin, goal 900 mg TID, to start with 300 mg daily  2) Ordered CT Chest to assess interval growth of MIKALA lesion since planning CT, ~ March 26th, to be scheduled  3) Reviewed expected treatment toxicities    Pamela Hartley MD MS PGY2  Discussed with Johana Galeas MD    Physician Attestation  Mr. Benitez was seen and examined by me. I agree with the findings and plan of care as documented in the note. Note above by Dr. Hartley was reviewed and edited by me and reflects our mutual findings and plan of care.  I personally reviewed the available treatment setup images and vital signs listed.     Johana Galeas MD  Department of Radiation Oncology  [facility]

## 2024-03-13 ENCOUNTER — APPOINTMENT (OUTPATIENT)
Dept: RADIATION ONCOLOGY | Facility: CLINIC | Age: 69
End: 2024-03-13
Attending: RADIOLOGY
Payer: COMMERCIAL

## 2024-03-13 PROCEDURE — 77014 PR CT GUIDE FOR PLACEMENT RADIATION THERAPY FIELDS: CPT | Mod: 26 | Performed by: RADIOLOGY

## 2024-03-13 PROCEDURE — 77386 HC IMRT TREATMENT DELIVERY, COMPLEX: CPT | Performed by: RADIOLOGY

## 2024-03-14 ENCOUNTER — APPOINTMENT (OUTPATIENT)
Dept: RADIATION ONCOLOGY | Facility: CLINIC | Age: 69
End: 2024-03-14
Attending: RADIOLOGY
Payer: COMMERCIAL

## 2024-03-14 PROCEDURE — 77386 HC IMRT TREATMENT DELIVERY, COMPLEX: CPT | Performed by: RADIOLOGY

## 2024-03-14 PROCEDURE — 77014 PR CT GUIDE FOR PLACEMENT RADIATION THERAPY FIELDS: CPT | Mod: 26 | Performed by: RADIOLOGY

## 2024-03-15 ENCOUNTER — APPOINTMENT (OUTPATIENT)
Dept: RADIATION ONCOLOGY | Facility: CLINIC | Age: 69
End: 2024-03-15
Attending: RADIOLOGY
Payer: COMMERCIAL

## 2024-03-15 PROCEDURE — 77336 RADIATION PHYSICS CONSULT: CPT | Performed by: RADIOLOGY

## 2024-03-15 PROCEDURE — 77386 HC IMRT TREATMENT DELIVERY, COMPLEX: CPT | Performed by: RADIOLOGY

## 2024-03-15 PROCEDURE — 77427 RADIATION TX MANAGEMENT X5: CPT | Mod: GC | Performed by: RADIOLOGY

## 2024-03-15 PROCEDURE — 77014 PR CT GUIDE FOR PLACEMENT RADIATION THERAPY FIELDS: CPT | Mod: 26 | Performed by: RADIOLOGY

## 2024-03-18 ENCOUNTER — APPOINTMENT (OUTPATIENT)
Dept: RADIATION ONCOLOGY | Facility: CLINIC | Age: 69
End: 2024-03-18
Attending: RADIOLOGY
Payer: COMMERCIAL

## 2024-03-18 PROCEDURE — 77014 PR CT GUIDE FOR PLACEMENT RADIATION THERAPY FIELDS: CPT | Mod: 26 | Performed by: RADIOLOGY

## 2024-03-18 PROCEDURE — 77386 HC IMRT TREATMENT DELIVERY, COMPLEX: CPT | Performed by: RADIOLOGY

## 2024-03-19 ENCOUNTER — APPOINTMENT (OUTPATIENT)
Dept: RADIATION ONCOLOGY | Facility: CLINIC | Age: 69
End: 2024-03-19
Attending: RADIOLOGY
Payer: COMMERCIAL

## 2024-03-19 VITALS
BODY MASS INDEX: 25.69 KG/M2 | HEART RATE: 82 BPM | SYSTOLIC BLOOD PRESSURE: 167 MMHG | WEIGHT: 164 LBS | DIASTOLIC BLOOD PRESSURE: 74 MMHG

## 2024-03-19 DIAGNOSIS — C31.9 MALIGNANT NEOPLASM OF PARANASAL SINUS (H): Primary | ICD-10-CM

## 2024-03-19 DIAGNOSIS — R13.10 DYSPHAGIA: ICD-10-CM

## 2024-03-19 PROCEDURE — 77386 HC IMRT TREATMENT DELIVERY, COMPLEX: CPT | Performed by: RADIOLOGY

## 2024-03-19 PROCEDURE — 97802 MEDICAL NUTRITION INDIV IN: CPT | Performed by: DIETITIAN, REGISTERED

## 2024-03-19 NOTE — LETTER
3/19/2024         RE: Opal Benitez  [address] [facility] [address]        Dear Colleague,    Thank you for referring your patient, Opal Benitez, to the [facility] RADIATION ONCOLOGY. Please see a copy of my visit note below.    RADIATION ONCOLOGY WEEKLY ON TREATMENT VISIT   Encounter Date: Mar 19, 2024    Patient Name: Opal Benitez  MRN: [id number]  : 1955     Disease and Stage: Right sinonasal squamous cell carcinoma, [id number], pT1N0  Treatment Site: H&N  Current Dose/Planned Total Dose: [1400] cGy / [6000] cGy  Daily Fraction Size: [200] cGy/day, [5] times/week    Concurrent Chemotherapy: No  Drug and Frequency: N/A    Surgeon: Dr. Cole & Dr. Singer    Treatment Summary:  Mr. 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    Subjective: Mr. Benitez presents to clinic today for his weekly on-treatment visit.  Overall, patient reports some concern about acute toxicities, states he is concerned based on Internet searches about weight loss.  He states his appetite is good at this time, has a dry mouth, normal energy, normal physical activity, doing his usual activities of daily living and instrumental activities of daily living, no loss of taste, no difficulty swallowing.    Nursing ROS:   Nutrition Alteration  Diet Type: Patient's Preference  Skin  Skin Reaction: 0 - No changes  Skin Note: Gabapentin-patient is only taking 2 tablets a day     ENT and Mouth Exam  Mucositis - Current: 0 - None   ENT/Mouth Note: S&S 15-20 patient states he is not doing salt and soda rinses  Cardiovascular  Respiratory effort: 1 - Normal - without distress  Gastrointestinal  Nausea: 0 - None     Psychosocial  Mood - Anxiety: 1 - Mild mood alteration  Pain Assessment  0-10 Pain Scale: 0     PEG Tube: No  Electronic Cardiac Implant: No    Objective:   BP (!) 167/74   Pulse 82   Wt 74.4  "kg (164 lb)   BMI 25.69 kg/m  , initial weight 74.4 kg (164 pounds)  Gen: Appears well, NAD  HEENT: No mucositis  Skin: No erythema    Laboratory:  No results found for: \"NTBNPI\", \"NTBNP\"  Lab Results   Component Value Date    WBC 7.4 01/22/2024    HGB 12.7 (L) 01/26/2024    HCT 45.8 01/22/2024    MCV 96 01/22/2024     01/22/2024     No results found for: \"MAG\"    Treatment-related toxicities (CTCAE v5.0):  Anorexia: Grade 0: No toxicity  Fatigue: Grade 0: No toxicity  Headache: Grade 0: No toxicity  Nausea: Grade 0: No toxicity  Pain: Grade 1: Mild pain  Dry mouth: Grade 1: Symptomatic without significant dietary alteration; unstimulated saliva flow >0.2 mL/min  Dysphagia: Grade 0: No toxicity  Mucositis: Grade 0: No toxicity  Dermatitis: Grade 0: No toxicity    ED visits/Hospitalizations:  None    Missed Treatments:  None    Mosaiq chart and setup information reviewed  IGRT images reviewed    Medication Review I  Med list reviewed with patient?: Yes  Med list printed and given: Offered and declined    Assessment:    Mr. 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 He is tolerating RT well    Plan:   1. Reviewed the importance of tapering of gabapentin dose to 900 mg 3 times daily, patient is currently taking 600 mg daily at this time  2. Reviewed the importance of daily salt/soda water rinses, recommend patient do up to 15 times daily  3. Reviewed expected treatment toxicities  4. Patient still require scheduling of CT chest to assess interval growth the left upper lobe lesion, yet to be scheduled    Pamela Hartley MD MS PGY2  Discussed with Johana Galeas MD    Physician Attestation  Mr. Benitez was seen and examined by me. I agree with the findings and plan of care as documented in the note. Note above by Dr. Hartley was reviewed and edited by me and reflects our mutual findings and " plan of care.  I personally reviewed the available treatment setup images and vital signs listed.     Johana Galeas MD  Department of Radiation Oncology  [facility]

## 2024-03-19 NOTE — PROGRESS NOTES
CLINICAL NUTRITION SERVICES - ASSESSMENT NOTE    Opal Benitez 68 year old referred for MNT related to head/neck cancer     Time Spent: 15 minutes  Visit Type: initial  Pt accompanied by: self  Referring Physician: Adal  Chemotherapy: No  Radiation: ~18 fractions remaining  Surgery history:   Past Surgical History:   Procedure Laterality Date    COLOGUARD(EXACT SCIENCES)  12/10/2018    COLOGUARD(EXACT SCIENCES)  11/18/2015    CYSTOSCOPY  2000    OPTICAL TRACKING SYSTEM ENDOSCOPIC SINUS SURGERY N/A 1/26/2024    Procedure: Stealth guided transnasal approach to resect sinonasal tumor with Rivero-Chito approach.;  Surgeon: Karina Kumar MD;  Location: [facility] OR     PEG tube: No    NUTRITION HISTORY  Factors affecting nutrition intake include:none at this time  Current diet/appetite: general diet, carb controlled with diabetes/good appetite       Patient Medical History  Past Medical History:   Diagnosis Date    Unspecified essential hypertension     Essential hypertension       Current Medications    Current Outpatient Medications:     dapagliflozin (FARXIGA) 10 MG TABS tablet, Take 1 tablet (10 mg) by mouth daily, Disp: 90 tablet, Rfl: 3    fish oil-omega-3 fatty acids 1000 MG capsule, Take 2 g by mouth daily (Patient not taking: Reported on 3/7/2024), Disp: , Rfl:     gabapentin (NEURONTIN) 300 MG capsule, Take 2 capsules (600 mg) by mouth 3 times daily, Disp: 270 capsule, Rfl: 4    lisinopril-hydrochlorothiazide (ZESTORETIC) 20-12.5 MG tablet, Take 1 tablet by mouth daily, Disp: 90 tablet, Rfl: 3    metFORMIN (GLUCOPHAGE XR) 500 MG 24 hr tablet, Take 4 tablets (2,000 mg) by mouth daily, Disp: 360 tablet, Rfl: 3    pravastatin (PRAVACHOL) 40 MG tablet, Take 1 tablet (40 mg) by mouth daily, Disp: 90 tablet, Rfl: 3    sodium chloride (OCEAN) 0.65 % nasal spray, Spray two sprays into both sides of your nose every 3 hours while you are awake, Disp: 88 mL, Rfl: 3    Medications have been  "reviewed.    Pertinent lab results:  Labs:  Electrolytes  Potassium (mmol/L)   Date Value   01/22/2024 4.5   10/09/2023 4.2   03/31/2023 5.0   06/13/2022 4.4   12/29/2021 4.1   12/15/2020 4.0    Blood Glucose  Glucose (mg/dL)   Date Value   01/22/2024 137 (H)   10/09/2023 125 (H)   03/31/2023 151 (H)   06/13/2022 149 (H)   12/29/2021 129 (H)   12/15/2020 136 (H)   11/09/2019 141 (H)   11/03/2018 155 (H)     GLUCOSE BY METER POCT (mg/dL)   Date Value   01/26/2024 200 (H)   01/26/2024 227 (H)   01/26/2024 156 (H)   01/26/2024 139 (H)     Hemoglobin A1C   Date Value   01/22/2024 7.2 % (H)   10/09/2023 7.7 % (H)   03/31/2023 7.7 % (H)   06/13/2022 7.8 % (H)   12/29/2021 7.7 (H)    Inflammatory Markers  WBC Count (10e3/uL)   Date Value   01/22/2024 7.4   10/09/2023 7.5      Sodium (mmol/L)   Date Value   01/22/2024 138   10/09/2023 137   03/31/2023 139    Renal  Urea Nitrogen (mg/dL)   Date Value   01/22/2024 22.9   10/09/2023 19.7   03/31/2023 19.8   06/13/2022 25   12/29/2021 20   12/15/2020 17     Creatinine (mg/dL)   Date Value   01/22/2024 0.71   10/09/2023 0.63 (L)   03/31/2023 0.75     Additional  Triglycerides (mg/dL)   Date Value   03/31/2023 114   06/13/2022 189 (H)   06/23/2021 181 (H)     Ketones Urine (mg/dL)   Date Value   03/31/2023 Negative        ANTHROPOMETRICS  Height: 67\"  Weight: 164lb/74kg  BMI: 25  Weight Status:  Normal BMI  IBW: 148 lbs   Weight History:   Wt Readings from Last 10 Encounters:   03/19/24 74.4 kg (164 lb)   03/12/24 74.4 kg (164 lb)   03/07/24 74.4 kg (164 lb)   02/06/24 74.6 kg (164 lb 8 oz)   02/02/24 73.5 kg (162 lb)   02/01/24 73.9 kg (162 lb 14.4 oz)   01/26/24 75 kg (165 lb 5.5 oz)   01/22/24 75.8 kg (167 lb)   12/21/23 78.4 kg (172 lb 14.4 oz)   10/09/23 77.2 kg (170 lb 4.8 oz)     Dosing Weight: 64kg    Medications/vitamins/minerals/herbals:   Reviewed    Labs:   Labs reviewed    NUTRITION FOCUSED PHYSICAL ASSESSMENT FOR DIAGNOSING MALNUTRITION:  Consult for education " only      ASSESSED NUTRITION NEEDS:  Estimated Energy Needs: 0263-5891 kcals (30-35 Kcal/Kg)  Justification: increased needs with radiation and activity  Estimated Protein Needs: 90 grams protein (1-1.2 g pro/Kg)  Justification: increased needs with radiation and activity  Estimated Fluid Needs: 2000  mL   Justification: maintenance    NUTRITION DIAGNOSIS:  No nutrition diagnosis identified at this time     INTERVENTIONS  Provided written & verbal education:     - Discussed the role of nutrition during cancer treatment.  Discussed principles of weight maintenance and oral intake recommendations for patients undergoing cancer treatment.  Reviewed the importance of maintaining current weight (within 5%, not more than 2 lb weight loss each week) during course of treatment  - Discussed nutrition and hydration needs. Encouraged pt to aim for at least 2200-2500kcal and 100g protein, 8 cups non-caffeine containing beverages (water/electrolytes) daily.    - Discussed strategies to help fortify meals and snacks. Encouraged to focus on small, frequent meals.    - Discussed sources of protein. Encouraged to have a protein source with each meal and snack.    - Discussed ONS options (Glucerna, Boost Hunger Smart etc) that are diabetes friendly.     Provided pt with corresponding education materials/handouts on:  [employer] High eliceo/High protein recipes, Tips to increase calories in your diet, Sources of Protein, Nutrition Considerations for patients with Head/neck cancer treatment, Soft/Moist sources of protein, Electrolyte hydration options    Pt verbalize understanding of materials provided during consult.   Patient Understanding: good  Expected patient engagement: good    Goals  1.  Aim for 5-6 small frequent meals  2.  Aim for 2200-250kcal and 100g protein/day  3. Weight maintenance     Follow-Up Plans: Pt has RD contact information for questions.      Aminata Tubbs RD, , LD  [facility]  [facility]  [phone]

## 2024-03-19 NOTE — PROGRESS NOTES
"RADIATION ONCOLOGY WEEKLY ON TREATMENT VISIT   Encounter Date: Mar 19, 2024    Patient Name: Opal Benitez  MRN: [id number]  : 1955     Disease and Stage: Right sinonasal squamous cell carcinoma, [id number], pT1N0  Treatment Site: H&N  Current Dose/Planned Total Dose: [1400] cGy / [6000] cGy  Daily Fraction Size: [200] cGy/day, [5] times/week    Concurrent Chemotherapy: No  Drug and Frequency: N/A    Surgeon: Dr. Cole & Dr. Singer    Treatment Summary:  Mr. 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    Subjective: Mr. Benitez presents to clinic today for his weekly on-treatment visit.  Overall, patient reports some concern about acute toxicities, states he is concerned based on Internet searches about weight loss.  He states his appetite is good at this time, has a dry mouth, normal energy, normal physical activity, doing his usual activities of daily living and instrumental activities of daily living, no loss of taste, no difficulty swallowing.    Nursing ROS:   Nutrition Alteration  Diet Type: Patient's Preference  Skin  Skin Reaction: 0 - No changes  Skin Note: Gabapentin-patient is only taking 2 tablets a day     ENT and Mouth Exam  Mucositis - Current: 0 - None   ENT/Mouth Note: S&S 15-20 patient states he is not doing salt and soda rinses  Cardiovascular  Respiratory effort: 1 - Normal - without distress  Gastrointestinal  Nausea: 0 - None     Psychosocial  Mood - Anxiety: 1 - Mild mood alteration  Pain Assessment  0-10 Pain Scale: 0     PEG Tube: No  Electronic Cardiac Implant: No    Objective:   BP (!) 167/74   Pulse 82   Wt 74.4 kg (164 lb)   BMI 25.69 kg/m  , initial weight 74.4 kg (164 pounds)  Gen: Appears well, NAD  HEENT: No mucositis  Skin: No erythema    Laboratory:  No results found for: \"NTBNPI\", \"NTBNP\"  Lab Results   Component Value Date    WBC 7.4 2024    HGB " "12.7 (L) 01/26/2024    HCT 45.8 01/22/2024    MCV 96 01/22/2024     01/22/2024     No results found for: \"MAG\"    Treatment-related toxicities (CTCAE v5.0):  Anorexia: Grade 0: No toxicity  Fatigue: Grade 0: No toxicity  Headache: Grade 0: No toxicity  Nausea: Grade 0: No toxicity  Pain: Grade 1: Mild pain  Dry mouth: Grade 1: Symptomatic without significant dietary alteration; unstimulated saliva flow >0.2 mL/min  Dysphagia: Grade 0: No toxicity  Mucositis: Grade 0: No toxicity  Dermatitis: Grade 0: No toxicity    ED visits/Hospitalizations:  None    Missed Treatments:  None    Mosaiq chart and setup information reviewed  IGRT images reviewed    Medication Review I  Med list reviewed with patient?: Yes  Med list printed and given: Offered and declined    Assessment:    Mr. 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 He is tolerating RT well    Plan:   1. Reviewed the importance of tapering of gabapentin dose to 900 mg 3 times daily, patient is currently taking 600 mg daily at this time  2. Reviewed the importance of daily salt/soda water rinses, recommend patient do up to 15 times daily  3. Reviewed expected treatment toxicities  4. Patient still require scheduling of CT chest to assess interval growth the left upper lobe lesion, yet to be scheduled    Pamela Hartley MD MS PGY2  Discussed with Johana Galeas MD    Physician Attestation  Mr. Benitez was seen and examined by me. I agree with the findings and plan of care as documented in the note. Note above by Dr. Hartley was reviewed and edited by me and reflects our mutual findings and plan of care.  I personally reviewed the available treatment setup images and vital signs listed.     Johana Galeas MD  Department of Radiation Oncology  [facility]          "

## 2024-03-20 ENCOUNTER — APPOINTMENT (OUTPATIENT)
Dept: RADIATION ONCOLOGY | Facility: CLINIC | Age: 69
End: 2024-03-20
Attending: RADIOLOGY
Payer: COMMERCIAL

## 2024-03-20 PROCEDURE — 77386 HC IMRT TREATMENT DELIVERY, COMPLEX: CPT | Performed by: RADIOLOGY

## 2024-03-20 PROCEDURE — 77014 PR CT GUIDE FOR PLACEMENT RADIATION THERAPY FIELDS: CPT | Mod: 26 | Performed by: RADIOLOGY

## 2024-03-21 ENCOUNTER — APPOINTMENT (OUTPATIENT)
Dept: RADIATION ONCOLOGY | Facility: CLINIC | Age: 69
End: 2024-03-21
Attending: RADIOLOGY
Payer: COMMERCIAL

## 2024-03-21 PROCEDURE — 77014 PR CT GUIDE FOR PLACEMENT RADIATION THERAPY FIELDS: CPT | Mod: 26 | Performed by: STUDENT IN AN ORGANIZED HEALTH CARE EDUCATION/TRAINING PROGRAM

## 2024-03-21 PROCEDURE — 77386 HC IMRT TREATMENT DELIVERY, COMPLEX: CPT | Performed by: RADIOLOGY

## 2024-03-22 ENCOUNTER — APPOINTMENT (OUTPATIENT)
Dept: RADIATION ONCOLOGY | Facility: CLINIC | Age: 69
End: 2024-03-22
Attending: RADIOLOGY
Payer: COMMERCIAL

## 2024-03-22 PROCEDURE — 77336 RADIATION PHYSICS CONSULT: CPT | Performed by: RADIOLOGY

## 2024-03-22 PROCEDURE — 77014 PR CT GUIDE FOR PLACEMENT RADIATION THERAPY FIELDS: CPT | Mod: 26 | Performed by: RADIOLOGY

## 2024-03-22 PROCEDURE — 77427 RADIATION TX MANAGEMENT X5: CPT | Mod: GC | Performed by: RADIOLOGY

## 2024-03-22 PROCEDURE — 77386 HC IMRT TREATMENT DELIVERY, COMPLEX: CPT | Performed by: RADIOLOGY

## 2024-03-25 ENCOUNTER — APPOINTMENT (OUTPATIENT)
Dept: RADIATION ONCOLOGY | Facility: CLINIC | Age: 69
End: 2024-03-25
Attending: RADIOLOGY
Payer: COMMERCIAL

## 2024-03-25 PROCEDURE — 77014 PR CT GUIDE FOR PLACEMENT RADIATION THERAPY FIELDS: CPT | Mod: 26 | Performed by: RADIOLOGY

## 2024-03-25 PROCEDURE — 77386 HC IMRT TREATMENT DELIVERY, COMPLEX: CPT | Performed by: RADIOLOGY

## 2024-03-26 ENCOUNTER — OFFICE VISIT (OUTPATIENT)
Dept: RADIATION ONCOLOGY | Facility: CLINIC | Age: 69
End: 2024-03-26
Attending: RADIOLOGY
Payer: COMMERCIAL

## 2024-03-26 VITALS
WEIGHT: 164 LBS | HEART RATE: 78 BPM | SYSTOLIC BLOOD PRESSURE: 154 MMHG | DIASTOLIC BLOOD PRESSURE: 81 MMHG | BODY MASS INDEX: 25.69 KG/M2

## 2024-03-26 DIAGNOSIS — C31.9 MALIGNANT NEOPLASM OF PARANASAL SINUS (H): Primary | ICD-10-CM

## 2024-03-26 PROCEDURE — 77386 HC IMRT TREATMENT DELIVERY, COMPLEX: CPT | Performed by: RADIOLOGY

## 2024-03-26 NOTE — LETTER
3/26/2024         RE: Opal Benitez  [address] [facility] [address]        Dear Colleague,    Thank you for referring your patient, Opal Benitez, to the [facility] RADIATION ONCOLOGY. Please see a copy of my visit note below.    RADIATION ONCOLOGY WEEKLY ON TREATMENT VISIT   Encounter Date: Mar 26, 2024    Patient Name: Opal Benitez  MRN: [id number]  : 1955     Disease and Stage: Right sinonasal squamous cell carcinoma, [id number], pT1N0  Treatment Site: H&N  Current Dose/Planned Total Dose: [2400] cGy / [6000] cGy  Daily Fraction Size: [200] cGy/day, [5] times/week    Concurrent Chemotherapy: No  Drug and Frequency: N/A    Surgeon: Dr. Cole & Dr. Singer    Treatment Summary:  Mr. 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    Subjective: Mr. Benitez presents to clinic today for his weekly on-treatment visit.  Overall, patient reports some concern about acute toxicities, states he is concerned based on Internet searches about weight loss.  He states his appetite is good at this time but he has lost some qualities of taste, has a dry mouth, normal energy, normal physical activity, doing his usual activities of daily living and instrumental activities of daily living,  no difficulty swallowing. Gabapentin is making him sleepy.    Nursing ROS:   Nutrition Alteration  Diet Type: Patient's Preference  Nutrition Note: No taste  Skin  Skin Reaction: 0 - No changes  Skin Note: Aquaphor     ENT and Mouth Exam  Mucositis - Current: 1 - Generalized erythema  ENT/Mouth Note: S&S 15-20  Cardiovascular  Respiratory effort: 1 - Normal - without distress  Gastrointestinal  Nausea: 0 - None     Psychosocial  Mood - Anxiety: 0 - Normal  Pain Assessment  0-10 Pain Scale: 0  Pain Note: Gabapentin 900 mg TID     PEG Tube: No  Electronic Cardiac Implant: No    Objective:   BP (!) 154/81   " Pulse 78   Wt 74.4 kg (164 lb)   BMI 25.69 kg/m  , initial weight 74.4 kg (164 pounds)  Gen: Appears well, NAD  HEENT: No mucositis  Skin: No erythema    Laboratory:  No results found for: \"NTBNPI\", \"NTBNP\"  Lab Results   Component Value Date    WBC 7.4 01/22/2024    HGB 12.7 (L) 01/26/2024    HCT 45.8 01/22/2024    MCV 96 01/22/2024     01/22/2024     No results found for: \"MAG\"    Treatment-related toxicities (CTCAE v5.0):  Anorexia: Grade 0: No toxicity  Fatigue: Grade 0: No toxicity  Headache: Grade 0: No toxicity  Nausea: Grade 0: No toxicity  Pain: Grade 1: Mild pain  Dry mouth: Grade 1: Symptomatic without significant dietary alteration; unstimulated saliva flow >0.2 mL/min  Dysphagia: Grade 0: No toxicity  Mucositis: Grade 0: No toxicity  Dermatitis: Grade 0: No toxicity    ED visits/Hospitalizations:  None    Missed Treatments:  None    Mosaiq chart and setup information reviewed  IGRT images reviewed    Medication Review I  Med list reviewed with patient?: Yes  Med list printed and given: Offered and declined    Assessment:    Mr. 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 He is tolerating RT well    Plan:   1. Continue gabapentin dose to 900 mg 3 times daily  2. Reviewed the importance of daily salt/soda water rinses, recommend patient do up to 15 times daily  3. Reviewed expected treatment toxicities and expected recovery time for dysguesia and fatigue. He is planning a trip one month post completion of RT and we have suggested that he delay his trip another month or so to allow for more recovery time.  4. CT chest is scheduled for 4/1/24 to assess interval growth the left upper lobe lesion      Johana Galeas MD  Department of Radiation Oncology  [facility]              "

## 2024-03-26 NOTE — PROGRESS NOTES
"RADIATION ONCOLOGY WEEKLY ON TREATMENT VISIT   Encounter Date: Mar 26, 2024    Patient Name: Opal Benitez  MRN: [id number]  : 1955     Disease and Stage: Right sinonasal squamous cell carcinoma, [id number], pT1N0  Treatment Site: H&N  Current Dose/Planned Total Dose: [2400] cGy / [6000] cGy  Daily Fraction Size: [200] cGy/day, [5] times/week    Concurrent Chemotherapy: No  Drug and Frequency: N/A    Surgeon: Dr. Cole & Dr. Singer    Treatment Summary:  Mr. 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    Subjective: Mr. Benitez presents to clinic today for his weekly on-treatment visit.  Overall, patient reports some concern about acute toxicities, states he is concerned based on Internet searches about weight loss.  He states his appetite is good at this time but he has lost some qualities of taste, has a dry mouth, normal energy, normal physical activity, doing his usual activities of daily living and instrumental activities of daily living,  no difficulty swallowing. Gabapentin is making him sleepy.    Nursing ROS:   Nutrition Alteration  Diet Type: Patient's Preference  Nutrition Note: No taste  Skin  Skin Reaction: 0 - No changes  Skin Note: Aquaphor     ENT and Mouth Exam  Mucositis - Current: 1 - Generalized erythema  ENT/Mouth Note: S&S 15-20  Cardiovascular  Respiratory effort: 1 - Normal - without distress  Gastrointestinal  Nausea: 0 - None     Psychosocial  Mood - Anxiety: 0 - Normal  Pain Assessment  0-10 Pain Scale: 0  Pain Note: Gabapentin 900 mg TID     PEG Tube: No  Electronic Cardiac Implant: No    Objective:   BP (!) 154/81   Pulse 78   Wt 74.4 kg (164 lb)   BMI 25.69 kg/m  , initial weight 74.4 kg (164 pounds)  Gen: Appears well, NAD  HEENT: No mucositis  Skin: No erythema    Laboratory:  No results found for: \"NTBNPI\", \"NTBNP\"  Lab Results   Component Value Date    " "WBC 7.4 01/22/2024    HGB 12.7 (L) 01/26/2024    HCT 45.8 01/22/2024    MCV 96 01/22/2024     01/22/2024     No results found for: \"MAG\"    Treatment-related toxicities (CTCAE v5.0):  Anorexia: Grade 0: No toxicity  Fatigue: Grade 0: No toxicity  Headache: Grade 0: No toxicity  Nausea: Grade 0: No toxicity  Pain: Grade 1: Mild pain  Dry mouth: Grade 1: Symptomatic without significant dietary alteration; unstimulated saliva flow >0.2 mL/min  Dysphagia: Grade 0: No toxicity  Mucositis: Grade 0: No toxicity  Dermatitis: Grade 0: No toxicity    ED visits/Hospitalizations:  None    Missed Treatments:  None    Mosaiq chart and setup information reviewed  IGRT images reviewed    Medication Review I  Med list reviewed with patient?: Yes  Med list printed and given: Offered and declined    Assessment:    Mr. 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 He is tolerating RT well    Plan:   1. Continue gabapentin dose to 900 mg 3 times daily  2. Reviewed the importance of daily salt/soda water rinses, recommend patient do up to 15 times daily  3. Reviewed expected treatment toxicities and expected recovery time for dysguesia and fatigue. He is planning a trip one month post completion of RT and we have suggested that he delay his trip another month or so to allow for more recovery time.  4. CT chest is scheduled for 4/1/24 to assess interval growth the left upper lobe lesion      Johana Galeas MD  Department of Radiation Oncology  [facility]          "

## 2024-03-27 ENCOUNTER — APPOINTMENT (OUTPATIENT)
Dept: RADIATION ONCOLOGY | Facility: CLINIC | Age: 69
End: 2024-03-27
Attending: RADIOLOGY
Payer: COMMERCIAL

## 2024-03-27 PROCEDURE — 77014 PR CT GUIDE FOR PLACEMENT RADIATION THERAPY FIELDS: CPT | Mod: 26 | Performed by: RADIOLOGY

## 2024-03-27 PROCEDURE — 77386 HC IMRT TREATMENT DELIVERY, COMPLEX: CPT | Performed by: RADIOLOGY

## 2024-03-28 ENCOUNTER — APPOINTMENT (OUTPATIENT)
Dept: RADIATION ONCOLOGY | Facility: CLINIC | Age: 69
End: 2024-03-28
Attending: RADIOLOGY
Payer: COMMERCIAL

## 2024-03-28 PROCEDURE — 77386 HC IMRT TREATMENT DELIVERY, COMPLEX: CPT | Performed by: RADIOLOGY

## 2024-03-28 PROCEDURE — 77014 PR CT GUIDE FOR PLACEMENT RADIATION THERAPY FIELDS: CPT | Mod: 26 | Performed by: RADIOLOGY

## 2024-03-29 ENCOUNTER — APPOINTMENT (OUTPATIENT)
Dept: RADIATION ONCOLOGY | Facility: CLINIC | Age: 69
End: 2024-03-29
Attending: RADIOLOGY
Payer: COMMERCIAL

## 2024-03-29 PROCEDURE — 77336 RADIATION PHYSICS CONSULT: CPT | Performed by: RADIOLOGY

## 2024-03-29 PROCEDURE — 77014 PR CT GUIDE FOR PLACEMENT RADIATION THERAPY FIELDS: CPT | Mod: 26 | Performed by: RADIOLOGY

## 2024-03-29 PROCEDURE — 77386 HC IMRT TREATMENT DELIVERY, COMPLEX: CPT | Performed by: RADIOLOGY

## 2024-03-29 PROCEDURE — 77427 RADIATION TX MANAGEMENT X5: CPT | Performed by: RADIOLOGY

## 2024-04-01 ENCOUNTER — OFFICE VISIT (OUTPATIENT)
Dept: RADIATION ONCOLOGY | Facility: CLINIC | Age: 69
End: 2024-04-01
Attending: RADIOLOGY
Payer: COMMERCIAL

## 2024-04-01 ENCOUNTER — HOSPITAL ENCOUNTER (OUTPATIENT)
Dept: CT IMAGING | Facility: CLINIC | Age: 69
Discharge: HOME | End: 2024-04-01
Attending: RADIOLOGY | Admitting: RADIOLOGY
Payer: COMMERCIAL

## 2024-04-01 VITALS
HEART RATE: 78 BPM | DIASTOLIC BLOOD PRESSURE: 84 MMHG | SYSTOLIC BLOOD PRESSURE: 130 MMHG | BODY MASS INDEX: 26.63 KG/M2 | WEIGHT: 170 LBS

## 2024-04-01 DIAGNOSIS — C31.9 MALIGNANT NEOPLASM OF PARANASAL SINUS (H): ICD-10-CM

## 2024-04-01 DIAGNOSIS — C31.9 MALIGNANT NEOPLASM OF PARANASAL SINUS (H): Primary | ICD-10-CM

## 2024-04-01 PROCEDURE — 77386 HC IMRT TREATMENT DELIVERY, COMPLEX: CPT | Performed by: RADIOLOGY

## 2024-04-01 PROCEDURE — 71250 CT THORAX DX C-: CPT | Mod: 26 | Performed by: RADIOLOGY

## 2024-04-01 PROCEDURE — 71250 CT THORAX DX C-: CPT

## 2024-04-01 NOTE — PROGRESS NOTES
"RADIATION ONCOLOGY WEEKLY ON TREATMENT VISIT   Encounter Date: 2024    Patient Name: Opal Benitez  MRN: [id number]  : 1955     Disease and Stage: Right sinonasal squamous cell carcinoma, [id number], pT1N0  Treatment Site: H&N  Current Dose/Planned Total Dose: [3400] cGy / [6000] cGy  Daily Fraction Size: [200] cGy/day, [5] times/week    Concurrent Chemotherapy: No  Drug and Frequency: N/A    Surgeon: Dr. Cole & Dr. Singer    Treatment Summary:  Mr. 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    Subjective: Mr. Benitez presents to clinic today for his weekly on-treatment visit. The patient is having reduced caloric intake, is having more instant ramen, as well as fish and meats. He states his appetite is good at this time but he has lost some qualities of taste, has a dry mouth, normal energy, normal physical activity, doing his usual activities of daily living and instrumental activities of daily living, still reports no difficulty swallowing.     Nursing ROS:   Nutrition Alteration  Diet Type: Patient's Preference  Nutrition Note: No taste  Skin  Skin Reaction: 0 - No changes  Skin Note: Aquaphor     ENT and Mouth Exam  Mucositis - Current: 1 - Generalized erythema  ENT/Mouth Note: S&S 15-20  Cardiovascular  Respiratory effort: 1 - Normal - without distress  Gastrointestinal  Nausea: 0 - None     Psychosocial  Mood - Anxiety: 0 - Normal  Pain Assessment  0-10 Pain Scale: 0  Pain Note: Gabapentin 900 mg TID    PEG Tube: No  Electronic Cardiac Implant: No    Objective:   /84   Pulse 78   Wt 77.1 kg (170 lb)   BMI 26.63 kg/m  , initial weight 74.4 kg (164 pounds)  Gen: Appears well, NAD  HEENT: No mucositis  Skin: Mild erythema    Laboratory:  No results found for: \"NTBNPI\", \"NTBNP\"  Lab Results   Component Value Date    WBC 7.4 2024    HGB 12.7 (L) 2024    HCT " 45.8 01/22/2024    MCV 96 01/22/2024     01/22/2024       Treatment-related toxicities (CTCAE v5.0):  Anorexia: Grade 0: No toxicity  Fatigue: Grade 1: Fatigue relieved by rest  Headache: Grade 0: No toxicity  Nausea: Grade 0: No toxicity  Pain: Grade 1: Mild pain  Dry mouth: Grade 1: Symptomatic without significant dietary alteration; unstimulated saliva flow >0.2 mL/min  Dysphagia: Grade 0: No toxicity  Mucositis: Grade 1: Asymptomatic or mild symptoms; intervention not indicated  Dermatitis: Grade 1: Faint erythema or dry desquamation    ED visits/Hospitalizations:  None    Missed Treatments:  None    Mosaiq chart and setup information reviewed  IGRT images reviewed    Medication Review I  Med list reviewed with patient?: Yes  Med list printed and given: Offered and declined    Assessment:    Mr. 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 He is tolerating RT well, having deficits in taste and reduced appetite (but weight is stable)    Plan:   1. Continue gabapentin dose to 900 mg 3 times daily  2. Reviewed the importance of daily salt/soda water rinses, recommend patient do up to 15 times daily  3. Reviewed expected treatment toxicities and expected recovery time for dysguesia and fatigue. He is planning a trip one month post completion of RT and we have suggested that he delay his trip another month or so to allow for more recovery time.  4. CT chest is scheduled for 4/1/24 to assess interval growth the left upper lobe lesion, the scan was done today  5. Discussed xylitol gum supplement for strange salty taste sensations  6. Discussed daily eye drops, preservative free, artifical tears & systane nighttime gel  7. Reviewed nasal spray, saline irrigation, and aquaphor    Pamela Hartley MD  Department of Radiation Oncology  [facility]    Physician  Attestation  Mr. Benitez was seen and examined by me. I agree with the findings and plan of care as documented in the note. Note above by Dr. Hartley was reviewed and edited by me and reflects our mutual findings and plan of care.  I personally reviewed the available treatment setup images and vital signs listed.     Johana Galeas MD  Department of Radiation Oncology  [facility]

## 2024-04-01 NOTE — LETTER
2024         RE: Opal Benitez  [address] [address]        Dear Colleague,    Thank you for referring your patient, Opal Benitez, to the [facility] RADIATION ONCOLOGY. Please see a copy of my visit note below.    RADIATION ONCOLOGY WEEKLY ON TREATMENT VISIT   Encounter Date: 2024    Patient Name: Opal Benitez  MRN: [id number]  : 1955     Disease and Stage: Right sinonasal squamous cell carcinoma, [id number], pT1N0  Treatment Site: H&N  Current Dose/Planned Total Dose: [3400] cGy / [6000] cGy  Daily Fraction Size: [200] cGy/day, [5] times/week    Concurrent Chemotherapy: No  Drug and Frequency: N/A    Surgeon: Dr. Cole & Dr. Singer    Treatment Summary:  Mr. 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    Subjective: Mr. Benitez presents to clinic today for his weekly on-treatment visit. The patient is having reduced caloric intake, is having more instant ramen, as well as fish and meats. He states his appetite is good at this time but he has lost some qualities of taste, has a dry mouth, normal energy, normal physical activity, doing his usual activities of daily living and instrumental activities of daily living, still reports no difficulty swallowing.     Nursing ROS:   Nutrition Alteration  Diet Type: Patient's Preference  Nutrition Note: No taste  Skin  Skin Reaction: 0 - No changes  Skin Note: Aquaphor     ENT and Mouth Exam  Mucositis - Current: 1 - Generalized erythema  ENT/Mouth Note: S&S 15-20  Cardiovascular  Respiratory effort: 1 - Normal - without distress  Gastrointestinal  Nausea: 0 - None     Psychosocial  Mood - Anxiety: 0 - Normal  Pain Assessment  0-10 Pain Scale: 0  Pain Note: Gabapentin 900 mg TID    PEG Tube: No  Electronic Cardiac Implant: No    Objective:   /84   Pulse 78   Wt 77.1 kg (170 lb)   BMI 26.63 kg/m  ,  "initial weight 74.4 kg (164 pounds)  Gen: Appears well, NAD  HEENT: No mucositis  Skin: Mild erythema    Laboratory:  No results found for: \"NTBNPI\", \"NTBNP\"  Lab Results   Component Value Date    WBC 7.4 01/22/2024    HGB 12.7 (L) 01/26/2024    HCT 45.8 01/22/2024    MCV 96 01/22/2024     01/22/2024       Treatment-related toxicities (CTCAE v5.0):  Anorexia: Grade 0: No toxicity  Fatigue: Grade 1: Fatigue relieved by rest  Headache: Grade 0: No toxicity  Nausea: Grade 0: No toxicity  Pain: Grade 1: Mild pain  Dry mouth: Grade 1: Symptomatic without significant dietary alteration; unstimulated saliva flow >0.2 mL/min  Dysphagia: Grade 0: No toxicity  Mucositis: Grade 1: Asymptomatic or mild symptoms; intervention not indicated  Dermatitis: Grade 1: Faint erythema or dry desquamation    ED visits/Hospitalizations:  None    Missed Treatments:  None    Mosaiq chart and setup information reviewed  IGRT images reviewed    Medication Review I  Med list reviewed with patient?: Yes  Med list printed and given: Offered and declined    Assessment:    Mr. 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 He is tolerating RT well, having deficits in taste and reduced appetite (but weight is stable)    Plan:   1. Continue gabapentin dose to 900 mg 3 times daily  2. Reviewed the importance of daily salt/soda water rinses, recommend patient do up to 15 times daily  3. Reviewed expected treatment toxicities and expected recovery time for dysguesia and fatigue. He is planning a trip one month post completion of RT and we have suggested that he delay his trip another month or so to allow for more recovery time.  4. CT chest is scheduled for 4/1/24 to assess interval growth the left upper lobe lesion, the scan was done today  5. Discussed xylitol gum supplement for strange salty taste sensations  6. Discussed " daily eye drops, preservative free, artifical tears & systane nighttime gel  7. Reviewed nasal spray, saline irrigation, and aquaphor    Pamela Hartley MD  Department of Radiation Oncology  [facility]    Physician Attestation  Mr. Benitez was seen and examined by me. I agree with the findings and plan of care as documented in the note. Note above by Dr. Hartley was reviewed and edited by me and reflects our mutual findings and plan of care.  I personally reviewed the available treatment setup images and vital signs listed.     Johana Galeas MD  Department of Radiation Oncology  [facility]

## 2024-04-02 ENCOUNTER — APPOINTMENT (OUTPATIENT)
Dept: RADIATION ONCOLOGY | Facility: CLINIC | Age: 69
End: 2024-04-02
Attending: RADIOLOGY
Payer: COMMERCIAL

## 2024-04-02 PROCEDURE — 77386 HC IMRT TREATMENT DELIVERY, COMPLEX: CPT | Performed by: RADIOLOGY

## 2024-04-02 PROCEDURE — 77014 PR CT GUIDE FOR PLACEMENT RADIATION THERAPY FIELDS: CPT | Mod: 26 | Performed by: RADIOLOGY

## 2024-04-03 ENCOUNTER — APPOINTMENT (OUTPATIENT)
Dept: RADIATION ONCOLOGY | Facility: CLINIC | Age: 69
End: 2024-04-03
Attending: RADIOLOGY
Payer: COMMERCIAL

## 2024-04-03 PROCEDURE — 77014 PR CT GUIDE FOR PLACEMENT RADIATION THERAPY FIELDS: CPT | Mod: 26 | Performed by: RADIOLOGY

## 2024-04-03 PROCEDURE — 77386 HC IMRT TREATMENT DELIVERY, COMPLEX: CPT | Performed by: RADIOLOGY

## 2024-04-04 ENCOUNTER — APPOINTMENT (OUTPATIENT)
Dept: RADIATION ONCOLOGY | Facility: CLINIC | Age: 69
End: 2024-04-04
Attending: RADIOLOGY
Payer: COMMERCIAL

## 2024-04-04 PROCEDURE — 77386 HC IMRT TREATMENT DELIVERY, COMPLEX: CPT | Performed by: RADIOLOGY

## 2024-04-04 PROCEDURE — 77014 PR CT GUIDE FOR PLACEMENT RADIATION THERAPY FIELDS: CPT | Mod: 26 | Performed by: RADIOLOGY

## 2024-04-05 ENCOUNTER — APPOINTMENT (OUTPATIENT)
Dept: RADIATION ONCOLOGY | Facility: CLINIC | Age: 69
End: 2024-04-05
Attending: RADIOLOGY
Payer: COMMERCIAL

## 2024-04-05 PROCEDURE — 77386 HC IMRT TREATMENT DELIVERY, COMPLEX: CPT | Performed by: RADIOLOGY

## 2024-04-05 PROCEDURE — 77014 PR CT GUIDE FOR PLACEMENT RADIATION THERAPY FIELDS: CPT | Mod: 26 | Performed by: RADIOLOGY

## 2024-04-05 PROCEDURE — 77427 RADIATION TX MANAGEMENT X5: CPT | Mod: GC | Performed by: RADIOLOGY

## 2024-04-05 PROCEDURE — 77336 RADIATION PHYSICS CONSULT: CPT | Performed by: RADIOLOGY

## 2024-04-08 ENCOUNTER — APPOINTMENT (OUTPATIENT)
Dept: RADIATION ONCOLOGY | Facility: CLINIC | Age: 69
End: 2024-04-08
Attending: RADIOLOGY
Payer: COMMERCIAL

## 2024-04-08 PROCEDURE — 77386 HC IMRT TREATMENT DELIVERY, COMPLEX: CPT | Performed by: RADIOLOGY

## 2024-04-08 PROCEDURE — 77014 PR CT GUIDE FOR PLACEMENT RADIATION THERAPY FIELDS: CPT | Mod: 26 | Performed by: RADIOLOGY

## 2024-04-09 ENCOUNTER — APPOINTMENT (OUTPATIENT)
Dept: RADIATION ONCOLOGY | Facility: CLINIC | Age: 69
End: 2024-04-09
Attending: RADIOLOGY
Payer: COMMERCIAL

## 2024-04-09 DIAGNOSIS — C31.9 MALIGNANT NEOPLASM OF PARANASAL SINUS (H): Primary | ICD-10-CM

## 2024-04-09 PROCEDURE — 97803 MED NUTRITION INDIV SUBSEQ: CPT | Performed by: DIETITIAN, REGISTERED

## 2024-04-09 PROCEDURE — 77014 PR CT GUIDE FOR PLACEMENT RADIATION THERAPY FIELDS: CPT | Mod: 26 | Performed by: RADIOLOGY

## 2024-04-09 PROCEDURE — 77386 HC IMRT TREATMENT DELIVERY, COMPLEX: CPT | Performed by: RADIOLOGY

## 2024-04-09 NOTE — PROGRESS NOTES
CLINICAL NUTRITION SERVICES - REASSESSMENT NOTE   EVALUATION OF PREVIOUS PLAN OF CARE:   Time Spent: 15 minutes  Visit Type: return  Pt accompanied by: self  Referring Physician: Adal  Chemotherapy: No  Radiation: ~8 fractions remaining  Monitoring from previous assessment:   Carb controlled diet with diabetes  -Food/Fluid intake - Opal reports that his taste has been affected by radiation but he continues to strive for optimal calories and protein.  He no longer has the taste for eggs, fish or meat and but has been trying to get protein from hamburger (spaghetti and sloppy joes).  He continues to eat 3 meals per day and snack between meals.   -Weight trends - up 6 lb x past 3 weeks  Wt Readings from Last 10 Encounters:   04/01/24 77.1 kg (170 lb)   03/26/24 74.4 kg (164 lb)   03/19/24 74.4 kg (164 lb)   03/12/24 74.4 kg (164 lb)   03/07/24 74.4 kg (164 lb)   02/06/24 74.6 kg (164 lb 8 oz)   02/02/24 73.5 kg (162 lb)   02/01/24 73.9 kg (162 lb 14.4 oz)   01/26/24 75 kg (165 lb 5.5 oz)   01/22/24 75.8 kg (167 lb)     Previous Goals:   1.  Aim for 5-6 small frequent meals  2.  Aim for 2200-250kcal and 100g protein/day  3. Weight maintenance   Evaluation: Met   Previous Nutrition Diagnosis:   No nutrition diagnosis identified at this time   Evaluation: No change   NEW FINDINGS:   Dysgeusia    CURRENT NUTRITION DIAGNOSIS   No nutrition diagnosis identified at this time   INTERVENTIONS   Composition of Meals and Snacks and General/healthful diet - reviewed nutrition and hydration during treatment.  Reviewed ways to cope with taste changes.     Goals   1.  Aim for 5-6 small frequent meals  2.  Aim for 2200-250kcal and 100g protein/day  3. Weight maintenance     Follow up/Monitoring: patient has RD contact information for further questions.     Aminata Tubbs RD, , LD  [facility]  [phone]

## 2024-04-10 ENCOUNTER — APPOINTMENT (OUTPATIENT)
Dept: RADIATION ONCOLOGY | Facility: CLINIC | Age: 69
End: 2024-04-10
Attending: RADIOLOGY
Payer: COMMERCIAL

## 2024-04-10 VITALS
SYSTOLIC BLOOD PRESSURE: 139 MMHG | BODY MASS INDEX: 26.16 KG/M2 | HEART RATE: 73 BPM | DIASTOLIC BLOOD PRESSURE: 86 MMHG | WEIGHT: 167 LBS

## 2024-04-10 DIAGNOSIS — C31.9 MALIGNANT NEOPLASM OF PARANASAL SINUS (H): Primary | ICD-10-CM

## 2024-04-10 PROCEDURE — 77386 HC IMRT TREATMENT DELIVERY, COMPLEX: CPT | Performed by: RADIOLOGY

## 2024-04-10 NOTE — PROGRESS NOTES
RADIATION ONCOLOGY WEEKLY ON TREATMENT VISIT   Encounter Date: Apr 10, 2024    Patient Name: Opal Benitez  MRN: [id number]  : 1955     Disease and Stage: Right sinonasal squamous cell carcinoma, [id number], pT1N0  Treatment Site: H&N  Current Dose/Planned Total Dose: [4600] cGy / [6000] cGy  Daily Fraction Size: [200] cGy/day, [5] times/week    Concurrent Chemotherapy: No  Drug and Frequency: N/A    Surgeon: Dr. Cole & Dr. Singer    Nursing ROS:   Nutrition Alteration  Diet Type: Patient's Preference  Nutrition Note: No taste  Skin  Skin Reaction: 1 - Faint erythema or dry desquamation  Skin Note: Aquaphor     ENT and Mouth Exam  Mucositis - Current: 1 - Generalized erythema  ENT/Mouth Note: S&S 15-20  Cardiovascular  Respiratory effort: 1 - Normal - without distress  Gastrointestinal  Nausea: 0 - None     Psychosocial  Mood - Anxiety: 0 - Normal  Pain Assessment  0-10 Pain Scale: 0  Pain Note: Gabapentin 900 mg TID    Treatment Summary:  Mr. 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    Subjective: Mr. Benitez presents to clinic today for his weekly on-treatment visit. The patient has lost 3 pounds this week from last week. He has nasal irrigation available to him. He has some pain in the inside lining of the nasal skin. He is using moisturizer daily. He is having increased redness of skin, has a dry mouth. He is having reduced caloric intake, is having more instant ramen, as well as fish and meats. He states his appetite is poor at this time because he has lost some qualities of taste, reduced energy, normal physical activity, doing his usual activities of daily living and instrumental activities of daily living, still reports no difficulty swallowing.    PEG Tube: No  Electronic Cardiac Implant: No    Objective:   /86   Pulse 73   Wt 75.8 kg (167 lb)   BMI 26.16 kg/m  ,  "initial weight 74.4 kg (164 pounds)  Gen: Appears well, NAD  HEENT: Mild mucositis in bilateral upper oropharynx  Skin: Moderate diffuse erythema in sinonasal face    Laboratory:  No results found for: \"NTBNPI\", \"NTBNP\"  Lab Results   Component Value Date    WBC 7.4 01/22/2024    HGB 12.7 (L) 01/26/2024    HCT 45.8 01/22/2024    MCV 96 01/22/2024     01/22/2024       Treatment-related toxicities (CTCAE v5.0):  Anorexia: Grade 1: Loss of appetite without alteration in eating habits  Fatigue: Grade 1: Fatigue relieved by rest  Headache: Grade 0: No toxicity  Nausea: Grade 1: Loss of appetite without alteration in eating habits  Pain: Grade 1: Mild pain  Dry mouth: Grade 1: Symptomatic without significant dietary alteration; unstimulated saliva flow >0.2 mL/min  Dysphagia: Grade 1: Symptomatic, able to eat regular diet  Mucositis: Grade 1: Asymptomatic or mild symptoms; intervention not indicated  Dermatitis: Grade 2: Moderate to brisk erythema; patchy moist desquamation, mostly confined to skin folds and creases; moderate erythema    ED visits/Hospitalizations:  None    Missed Treatments:  None    Mosaiq chart and setup information reviewed  IGRT images reviewed    Medication Review I  Med list reviewed with patient?: Yes  Med list printed and given: Offered and declined    Assessment:    Mr. 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 He is tolerating RT well, having deficits in taste and reduced appetite (but weight is stable)    Plan:   1. Continue gabapentin dose to 900 mg 3 times daily  2. Reviewed the importance of daily salt/soda water rinses, recommend patient do up to 15 times daily  3. Reviewed expected treatment toxicities and expected recovery time for dysguesia and fatigue. He is planning a trip one month post completion of RT and we have suggested that he delay his trip another " month or so to allow for more recovery time.  4. CT chest is scheduled for 4/1/24 to assess interval growth the left upper lobe lesion, the scan was done, we reviewed his latest CT scan, which showed stable lung nodule from his last PET-CT, plan is still to consider biopsy, but not urgent, likely can re-image with PET scan 12-weeks post RT and revisit biopsy.  5. Discussed xylitol gum supplement for strange salty taste sensations  6. Discussed daily eye drops, preservative free, artifical tears & systane nighttime gel  7. Reviewed nasal spray, saline irrigation, and aquaphor, patient is to continue these interventions    Pamela Hartley MD  Department of Radiation Oncology  [facility]    Physician Attestation  Mr. Benitez was seen and examined by me. I agree with the findings and plan of care as documented in the note. Note above by Dr. Hartley was reviewed and edited by me and reflects our mutual findings and plan of care.  I personally reviewed the available treatment setup images and vital signs listed.     Johana Galeas MD  Department of Radiation Oncology  [facility]

## 2024-04-10 NOTE — LETTER
4/10/2024         RE: Opal Benitez  [address] [facility] [address]        Dear Colleague,    Thank you for referring your patient, Opal Benitez, to the LTAC, located within [facility] RADIATION ONCOLOGY. Please see a copy of my visit note below.    RADIATION ONCOLOGY WEEKLY ON TREATMENT VISIT   Encounter Date: Apr 10, 2024    Patient Name: Opal Benitez  MRN: [id number]  : 1955     Disease and Stage: Right sinonasal squamous cell carcinoma, [id number], pT1N0  Treatment Site: H&N  Current Dose/Planned Total Dose: [4600] cGy / [6000] cGy  Daily Fraction Size: [200] cGy/day, [5] times/week    Concurrent Chemotherapy: No  Drug and Frequency: N/A    Surgeon: Dr. Cole & Dr. Singer    Nursing ROS:   Nutrition Alteration  Diet Type: Patient's Preference  Nutrition Note: No taste  Skin  Skin Reaction: 1 - Faint erythema or dry desquamation  Skin Note: Aquaphor     ENT and Mouth Exam  Mucositis - Current: 1 - Generalized erythema  ENT/Mouth Note: S&S 15-20  Cardiovascular  Respiratory effort: 1 - Normal - without distress  Gastrointestinal  Nausea: 0 - None     Psychosocial  Mood - Anxiety: 0 - Normal  Pain Assessment  0-10 Pain Scale: 0  Pain Note: Gabapentin 900 mg TID    Treatment Summary:  Mr. 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    Subjective: Mr. Benitez presents to clinic today for his weekly on-treatment visit. The patient has lost 3 pounds this week from last week. He has nasal irrigation available to him. He has some pain in the inside lining of the nasal skin. He is using moisturizer daily. He is having increased redness of skin, has a dry mouth. He is having reduced caloric intake, is having more instant ramen, as well as fish and meats. He states his appetite is poor at this time because he has lost some qualities of taste, reduced energy, normal physical activity, doing his  "usual activities of daily living and instrumental activities of daily living, still reports no difficulty swallowing.    PEG Tube: No  Electronic Cardiac Implant: No    Objective:   /86   Pulse 73   Wt 75.8 kg (167 lb)   BMI 26.16 kg/m  , initial weight 74.4 kg (164 pounds)  Gen: Appears well, NAD  HEENT: Mild mucositis in bilateral upper oropharynx  Skin: Moderate diffuse erythema in sinonasal face    Laboratory:  No results found for: \"NTBNPI\", \"NTBNP\"  Lab Results   Component Value Date    WBC 7.4 01/22/2024    HGB 12.7 (L) 01/26/2024    HCT 45.8 01/22/2024    MCV 96 01/22/2024     01/22/2024       Treatment-related toxicities (CTCAE v5.0):  Anorexia: Grade 1: Loss of appetite without alteration in eating habits  Fatigue: Grade 1: Fatigue relieved by rest  Headache: Grade 0: No toxicity  Nausea: Grade 1: Loss of appetite without alteration in eating habits  Pain: Grade 1: Mild pain  Dry mouth: Grade 1: Symptomatic without significant dietary alteration; unstimulated saliva flow >0.2 mL/min  Dysphagia: Grade 1: Symptomatic, able to eat regular diet  Mucositis: Grade 1: Asymptomatic or mild symptoms; intervention not indicated  Dermatitis: Grade 2: Moderate to brisk erythema; patchy moist desquamation, mostly confined to skin folds and creases; moderate erythema    ED visits/Hospitalizations:  None    Missed Treatments:  None    Mosaiq chart and setup information reviewed  IGRT images reviewed    Medication Review I  Med list reviewed with patient?: Yes  Med list printed and given: Offered and declined    Assessment:    Mr. 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 He is tolerating RT well, having deficits in taste and reduced appetite (but weight is stable)    Plan:   1. Continue gabapentin dose to 900 mg 3 times daily  2. Reviewed the importance of daily salt/soda water " rinses, recommend patient do up to 15 times daily  3. Reviewed expected treatment toxicities and expected recovery time for dysguesia and fatigue. He is planning a trip one month post completion of RT and we have suggested that he delay his trip another month or so to allow for more recovery time.  4. CT chest is scheduled for 4/1/24 to assess interval growth the left upper lobe lesion, the scan was done, we reviewed his latest CT scan, which showed stable lung nodule from his last PET-CT, plan is still to consider biopsy, but not urgent, likely can re-image with PET scan 12-weeks post RT and revisit biopsy.  5. Discussed xylitol gum supplement for strange salty taste sensations  6. Discussed daily eye drops, preservative free, artifical tears & systane nighttime gel  7. Reviewed nasal spray, saline irrigation, and aquaphor, patient is to continue these interventions    Pamela Hartley MD  Department of Radiation Oncology  [facility]    Physician Attestation  Mr. Benitez was seen and examined by me. I agree with the findings and plan of care as documented in the note. Note above by Dr. Hartley was reviewed and edited by me and reflects our mutual findings and plan of care.  I personally reviewed the available treatment setup images and vital signs listed.     Johana Galeas MD  Department of Radiation Oncology  [facility]              Again, thank you for allowing me to participate in the care of your patient.        Sincerely,        Johana Galeas MD

## 2024-04-11 ENCOUNTER — APPOINTMENT (OUTPATIENT)
Dept: RADIATION ONCOLOGY | Facility: CLINIC | Age: 69
End: 2024-04-11
Attending: RADIOLOGY
Payer: COMMERCIAL

## 2024-04-11 PROCEDURE — 77014 PR CT GUIDE FOR PLACEMENT RADIATION THERAPY FIELDS: CPT | Mod: 26 | Performed by: RADIOLOGY

## 2024-04-11 PROCEDURE — 77386 HC IMRT TREATMENT DELIVERY, COMPLEX: CPT | Performed by: RADIOLOGY

## 2024-04-12 ENCOUNTER — APPOINTMENT (OUTPATIENT)
Dept: RADIATION ONCOLOGY | Facility: CLINIC | Age: 69
End: 2024-04-12
Attending: RADIOLOGY
Payer: COMMERCIAL

## 2024-04-12 PROCEDURE — 77386 HC IMRT TREATMENT DELIVERY, COMPLEX: CPT | Performed by: RADIOLOGY

## 2024-04-12 PROCEDURE — 77427 RADIATION TX MANAGEMENT X5: CPT | Mod: GC | Performed by: RADIOLOGY

## 2024-04-12 PROCEDURE — 77336 RADIATION PHYSICS CONSULT: CPT | Performed by: RADIOLOGY

## 2024-04-12 PROCEDURE — 77014 PR CT GUIDE FOR PLACEMENT RADIATION THERAPY FIELDS: CPT | Mod: 26 | Performed by: RADIOLOGY

## 2024-04-15 ENCOUNTER — APPOINTMENT (OUTPATIENT)
Dept: RADIATION ONCOLOGY | Facility: CLINIC | Age: 69
End: 2024-04-15
Attending: RADIOLOGY
Payer: COMMERCIAL

## 2024-04-15 VITALS
HEART RATE: 74 BPM | DIASTOLIC BLOOD PRESSURE: 88 MMHG | BODY MASS INDEX: 26.08 KG/M2 | WEIGHT: 166.5 LBS | SYSTOLIC BLOOD PRESSURE: 128 MMHG

## 2024-04-15 DIAGNOSIS — G89.3 CANCER RELATED PAIN: ICD-10-CM

## 2024-04-15 DIAGNOSIS — C31.9 MALIGNANT NEOPLASM OF PARANASAL SINUS (H): Primary | ICD-10-CM

## 2024-04-15 PROCEDURE — 77386 HC IMRT TREATMENT DELIVERY, COMPLEX: CPT | Performed by: RADIOLOGY

## 2024-04-15 NOTE — PROGRESS NOTES
RADIATION ONCOLOGY WEEKLY ON TREATMENT VISIT   Encounter Date: Apr 15, 2024    Patient Name: Opal Benitez  MRN: [id number]  : 1955     Disease and Stage: Right sinonasal squamous cell carcinoma, [id number], pT1N0  Treatment Site: H&N  Current Dose/Planned Total Dose: [5200] cGy / [6000] cGy  Daily Fraction Size: [200] cGy/day, [5] times/week    Concurrent Chemotherapy: No  Drug and Frequency: N/A    Surgeon: Dr. Cole & Dr. Singer    Treatment Summary:  Mr. 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    Subjective: Mr. Benitez presents to clinic today for his weekly on-treatment visit.  He continues to eat a regular diet and notes that he does feel hunger and some desire to eat.  He has nasal irrigation available to him. He has some pain in the inside lining of the nasal skin, right throat and right cheek. He is using moisturizer daily. He is having increased redness of skin, has a dry mouth. He is having reduced caloric intake, is having more instant ramen, as well as fish and meats. He states his appetite is poor at this time because he has lost some qualities of taste, reduced energy, normal physical activity, doing his usual activities of daily living and instrumental activities of daily living, still reports no difficulty swallowing.    PEG Tube: No  Electronic Cardiac Implant: No    Objective:   /88   Pulse 74   Wt 75.5 kg (166 lb 8 oz)   BMI 26.08 kg/m  , initial weight 74.4 kg (164 pounds)  Gen: Appears well, NAD  HEENT: Mild mucositis in bilateral upper oropharynx  Skin: Moderate diffuse erythema in sinonasal face    Nursing ROS:   Nutrition Alteration  Diet Type: Patient's Preference  Nutrition Note: No taste  Skin  Skin Reaction: 1 - Faint erythema or dry desquamation  Skin Note: Aquaphor     ENT and Mouth Exam  Mucositis - Current: 1 - Generalized erythema  ENT/Mouth  "Note: S&S 15-20  Cardiovascular  Respiratory effort: 1 - Normal - without distress  Gastrointestinal  Nausea: 0 - None     Psychosocial  Mood - Anxiety: 0 - Normal  Pain Assessment  0-10 Pain Scale: 3  Pain Note: Gabapentin 900 mg TID     Laboratory:  No results found for: \"NTBNPI\", \"NTBNP\"  Lab Results   Component Value Date    WBC 7.4 01/22/2024    HGB 12.7 (L) 01/26/2024    HCT 45.8 01/22/2024    MCV 96 01/22/2024     01/22/2024     Treatment-related toxicities (CTCAE v5.0):  Anorexia: Grade 2: Oral intake altered without significant weight loss or malnutrition; oral nutritional supplements indicated  Fatigue: Grade 1: Fatigue relieved by rest  Headache: Grade 0: No toxicity  Nausea: Grade 1: Loss of appetite without alteration in eating habits  Pain: Grade 1: Mild pain  Dry mouth: Grade 1: Symptomatic without significant dietary alteration; unstimulated saliva flow >0.2 mL/min  Dysphagia: Grade 1: Symptomatic, able to eat regular diet  Mucositis: Grade 2: Moderate pain; not interfering with oral intake; modified diet indicated  Dermatitis: Grade 2: Moderate to brisk erythema; patchy moist desquamation, mostly confined to skin folds and creases; moderate erythema    ED visits/Hospitalizations:  None    Missed Treatments:  None    Mosaiq chart and setup information reviewed  IGRT images reviewed    Medication Review I  Med list reviewed with patient?: Yes  Med list printed and given: Offered and declined    Assessment:    Mr. 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 He is tolerating RT well, having deficits in taste and reduced appetite (but weight is stable)    Plan:   1. Continue gabapentin dose to 900 mg 3 times daily, discussed taper after completion of radiation.  2. Reviewed the importance of daily salt/soda water rinses, recommend patient do up to 15 times daily  3. Reviewed " expected treatment toxicities and expected recovery time for dysguesia and fatigue. He is planning a trip one month post completion of RT and we have suggested that he delay his trip another month or so to allow for more recovery time.  4. CT chest is scheduled for 4/1/24 to assess interval growth the left upper lobe lesion, the scan was done, we reviewed his latest CT scan, which showed stable lung nodule from his last PET-CT, plan is still to consider biopsy, but not urgent, likely can re-image with PET scan 12-weeks post RT and revisit biopsy.  5. Discussed xylitol gum supplement for strange salty taste sensations  6. Discussed daily eye drops, preservative free, artifical tears & systane nighttime gel  7. Reviewed nasal spray, saline irrigation, and aquaphor, patient is to continue these interventions  8. Completes radiation treatment this week.  9.  Follow-up with ENT as scheduled in 6 weeks, follow-up with Radiation Oncology in 3 months after PET CT scan.    Johana Galeas MD  Department of Radiation Oncology  [facility]

## 2024-04-15 NOTE — LETTER
4/15/2024         RE: Opal Benitez  [address] [facility] [address]        Dear Colleague,    Thank you for referring your patient, Opal Benitez, to the [facility] RADIATION ONCOLOGY. Please see a copy of my visit note below.    RADIATION ONCOLOGY WEEKLY ON TREATMENT VISIT   Encounter Date: Apr 15, 2024    Patient Name: Opal Benitez  MRN: [id number]  : 1955     Disease and Stage: Right sinonasal squamous cell carcinoma, [id number], pT1N0  Treatment Site: H&N  Current Dose/Planned Total Dose: [5200] cGy / [6000] cGy  Daily Fraction Size: [200] cGy/day, [5] times/week    Concurrent Chemotherapy: No  Drug and Frequency: N/A    Surgeon: Dr. Cole & Dr. Singer    Treatment Summary:  Mr. 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    Subjective: Mr. Benitez presents to clinic today for his weekly on-treatment visit.  He continues to eat a regular diet and notes that he does feel hunger and some desire to eat.  He has nasal irrigation available to him. He has some pain in the inside lining of the nasal skin, right throat and right cheek. He is using moisturizer daily. He is having increased redness of skin, has a dry mouth. He is having reduced caloric intake, is having more instant ramen, as well as fish and meats. He states his appetite is poor at this time because he has lost some qualities of taste, reduced energy, normal physical activity, doing his usual activities of daily living and instrumental activities of daily living, still reports no difficulty swallowing.    PEG Tube: No  Electronic Cardiac Implant: No    Objective:   /88   Pulse 74   Wt 75.5 kg (166 lb 8 oz)   BMI 26.08 kg/m  , initial weight 74.4 kg (164 pounds)  Gen: Appears well, NAD  HEENT: Mild mucositis in bilateral upper oropharynx  Skin: Moderate diffuse erythema in sinonasal face    Nursing  "ROS:   Nutrition Alteration  Diet Type: Patient's Preference  Nutrition Note: No taste  Skin  Skin Reaction: 1 - Faint erythema or dry desquamation  Skin Note: Aquaphor     ENT and Mouth Exam  Mucositis - Current: 1 - Generalized erythema  ENT/Mouth Note: S&S 15-20  Cardiovascular  Respiratory effort: 1 - Normal - without distress  Gastrointestinal  Nausea: 0 - None     Psychosocial  Mood - Anxiety: 0 - Normal  Pain Assessment  0-10 Pain Scale: 3  Pain Note: Gabapentin 900 mg TID     Laboratory:  No results found for: \"NTBNPI\", \"NTBNP\"  Lab Results   Component Value Date    WBC 7.4 01/22/2024    HGB 12.7 (L) 01/26/2024    HCT 45.8 01/22/2024    MCV 96 01/22/2024     01/22/2024     Treatment-related toxicities (CTCAE v5.0):  Anorexia: Grade 2: Oral intake altered without significant weight loss or malnutrition; oral nutritional supplements indicated  Fatigue: Grade 1: Fatigue relieved by rest  Headache: Grade 0: No toxicity  Nausea: Grade 1: Loss of appetite without alteration in eating habits  Pain: Grade 1: Mild pain  Dry mouth: Grade 1: Symptomatic without significant dietary alteration; unstimulated saliva flow >0.2 mL/min  Dysphagia: Grade 1: Symptomatic, able to eat regular diet  Mucositis: Grade 2: Moderate pain; not interfering with oral intake; modified diet indicated  Dermatitis: Grade 2: Moderate to brisk erythema; patchy moist desquamation, mostly confined to skin folds and creases; moderate erythema    ED visits/Hospitalizations:  None    Missed Treatments:  None    Mosaiq chart and setup information reviewed  IGRT images reviewed    Medication Review I  Med list reviewed with patient?: Yes  Med list printed and given: Offered and declined    Assessment:    Mr. 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 He is tolerating RT well, having deficits in taste and reduced appetite " (but weight is stable)    Plan:   1. Continue gabapentin dose to 900 mg 3 times daily, discussed taper after completion of radiation.  2. Reviewed the importance of daily salt/soda water rinses, recommend patient do up to 15 times daily  3. Reviewed expected treatment toxicities and expected recovery time for dysguesia and fatigue. He is planning a trip one month post completion of RT and we have suggested that he delay his trip another month or so to allow for more recovery time.  4. CT chest is scheduled for 4/1/24 to assess interval growth the left upper lobe lesion, the scan was done, we reviewed his latest CT scan, which showed stable lung nodule from his last PET-CT, plan is still to consider biopsy, but not urgent, likely can re-image with PET scan 12-weeks post RT and revisit biopsy.  5. Discussed xylitol gum supplement for strange salty taste sensations  6. Discussed daily eye drops, preservative free, artifical tears & systane nighttime gel  7. Reviewed nasal spray, saline irrigation, and aquaphor, patient is to continue these interventions  8. Completes radiation treatment this week.  9.  Follow-up with ENT as scheduled in 6 weeks, follow-up with Radiation Oncology in 3 months after PET CT scan.    Johana Galeas MD  Department of Radiation Oncology  [facility]

## 2024-04-16 ENCOUNTER — TELEPHONE (OUTPATIENT)
Dept: OTOLARYNGOLOGY | Facility: CLINIC | Age: 69
End: 2024-04-16
Payer: COMMERCIAL

## 2024-04-16 ENCOUNTER — APPOINTMENT (OUTPATIENT)
Dept: RADIATION ONCOLOGY | Facility: CLINIC | Age: 69
End: 2024-04-16
Attending: RADIOLOGY
Payer: COMMERCIAL

## 2024-04-16 PROCEDURE — 77386 HC IMRT TREATMENT DELIVERY, COMPLEX: CPT | Performed by: RADIOLOGY

## 2024-04-16 PROCEDURE — 77014 PR CT GUIDE FOR PLACEMENT RADIATION THERAPY FIELDS: CPT | Mod: 26 | Performed by: RADIOLOGY

## 2024-04-17 ENCOUNTER — APPOINTMENT (OUTPATIENT)
Dept: RADIATION ONCOLOGY | Facility: CLINIC | Age: 69
End: 2024-04-17
Attending: RADIOLOGY
Payer: COMMERCIAL

## 2024-04-17 PROCEDURE — 77386 HC IMRT TREATMENT DELIVERY, COMPLEX: CPT | Performed by: RADIOLOGY

## 2024-04-17 PROCEDURE — 77014 PR CT GUIDE FOR PLACEMENT RADIATION THERAPY FIELDS: CPT | Mod: 26 | Performed by: RADIOLOGY

## 2024-04-18 ENCOUNTER — APPOINTMENT (OUTPATIENT)
Dept: RADIATION ONCOLOGY | Facility: CLINIC | Age: 69
End: 2024-04-18
Attending: RADIOLOGY
Payer: COMMERCIAL

## 2024-04-18 PROCEDURE — 77386 HC IMRT TREATMENT DELIVERY, COMPLEX: CPT | Performed by: RADIOLOGY

## 2024-04-18 PROCEDURE — 77014 PR CT GUIDE FOR PLACEMENT RADIATION THERAPY FIELDS: CPT | Mod: 26 | Performed by: RADIOLOGY

## 2024-04-19 ENCOUNTER — APPOINTMENT (OUTPATIENT)
Dept: RADIATION ONCOLOGY | Facility: CLINIC | Age: 69
End: 2024-04-19
Attending: RADIOLOGY
Payer: COMMERCIAL

## 2024-04-19 PROCEDURE — 77386 HC IMRT TREATMENT DELIVERY, COMPLEX: CPT | Performed by: RADIOLOGY

## 2024-04-19 PROCEDURE — 77427 RADIATION TX MANAGEMENT X5: CPT | Performed by: RADIOLOGY

## 2024-04-19 PROCEDURE — 77014 PR CT GUIDE FOR PLACEMENT RADIATION THERAPY FIELDS: CPT | Mod: 26 | Performed by: RADIOLOGY

## 2024-04-19 PROCEDURE — 77336 RADIATION PHYSICS CONSULT: CPT | Performed by: RADIOLOGY

## 2024-05-03 ENCOUNTER — TELEPHONE (OUTPATIENT)
Dept: ONCOLOGY | Facility: CLINIC | Age: 69
End: 2024-05-03
Payer: COMMERCIAL

## 2024-05-03 NOTE — TELEPHONE ENCOUNTER
Called and spoke with pt's wife Emmanuel who said she will have the pt call me back because he is still recovering and takes naps during the day.    Estephania Subramanian on 5/3/2024 at 2:26 PM

## 2024-05-03 NOTE — TELEPHONE ENCOUNTER
Called pt to offer 5/15 surgery date with Dr. Singer at  and got no answer. Left voicemail message with direct call back number [phone].    Estephania Subramanian on 5/3/2024 at 2:19 PM

## 2024-05-06 NOTE — TELEPHONE ENCOUNTER
Called and spoke with the pt to confirm the 5/15 surgery date. Pt stated it was recommended that surgery be after his next CT due to the nodule being stable per his last CT after radiation. He would like to speak with Dr. Singer about this and is ok with seeing him in clinic if needed, but feels that if surgeon is ok with waiting until after his next follow up in July, then so is he. Notified care team.     Estephania Subramanian on 5/6/2024 at 11:22 AM

## 2024-05-07 ENCOUNTER — PATIENT OUTREACH (OUTPATIENT)
Dept: SURGERY | Facility: CLINIC | Age: 69
End: 2024-05-07
Payer: COMMERCIAL

## 2024-05-07 NOTE — PROGRESS NOTES
Called patient and had to leave a voicemail for patient with call back information. Also sent patient a [employer] message.    Update: patient called writer back to discuss his surgery and scheduling an appointment with Dr. Singer. He has finished radiation and wanted to know Dr. Singer's opinion if surgery can wait until July. Patient has been dealing with weakness, no appetite, and has been very tired. His personal preference is to wait until July to have surgery. He will have a PET scan and then will have an in person visit with Dr. Singer. He appreciated the call and had no further questions or concerns.

## 2024-05-16 ENCOUNTER — ONCOLOGY VISIT (OUTPATIENT)
Dept: RADIATION ONCOLOGY | Facility: CLINIC | Age: 69
End: 2024-05-16

## 2024-05-16 DIAGNOSIS — E11.9 TYPE 2 DIABETES MELLITUS WITHOUT COMPLICATION, WITHOUT LONG-TERM CURRENT USE OF INSULIN (H): ICD-10-CM

## 2024-05-16 DIAGNOSIS — E78.5 DYSLIPIDEMIA: ICD-10-CM

## 2024-05-16 DIAGNOSIS — I10 PRIMARY HYPERTENSION: ICD-10-CM

## 2024-05-16 RX ORDER — LISINOPRIL AND HYDROCHLOROTHIAZIDE 12.5; 2 MG/1; MG/1
1 TABLET ORAL DAILY
Qty: 90 TABLET | Refills: 3 | Status: SHIPPED | OUTPATIENT
Start: 2024-05-16 | End: 2024-06-17

## 2024-05-16 RX ORDER — PRAVASTATIN SODIUM 40 MG
40 TABLET ORAL DAILY
Qty: 90 TABLET | Refills: 3 | Status: SHIPPED | OUTPATIENT
Start: 2024-05-16 | End: 2024-06-17

## 2024-05-16 RX ORDER — DAPAGLIFLOZIN 10 MG/1
10 TABLET, FILM COATED ORAL DAILY
Qty: 90 TABLET | Refills: 3 | Status: SHIPPED | OUTPATIENT
Start: 2024-05-16 | End: 2024-06-17

## 2024-05-16 NOTE — TELEPHONE ENCOUNTER
05/16/2024    Pt will a 1 month bridge on these 3 Rx's until he comes in for his yearly exam on 06/17/2024.  Pt only has 4 pills left on each of these medications.    Cornelia Rdz

## 2024-05-16 NOTE — LETTER
5/16/2024      Opal Benitez  [address] [facility] [address]      Dear Colleague,    Thank you for referring your patient, Opal Benitez, to the [facility] RADIATION ONCOLOGY. Please see a copy of my visit note below.    No notes on file    Again, thank you for allowing me to participate in the care of your patient.        Sincerely,        Johana Galeas MD

## 2024-05-16 NOTE — TELEPHONE ENCOUNTER
Medication Question or Refill    Contacts         Type Contact Phone/Fax    05/16/2024 09:35 AM CDT Phone (Incoming) Emmanuel Ye (Emergency Contact) [phone] (H)    05/16/2024 09:36 AM CDT Phone (Incoming) Emmanuel Ye (Emergency Contact) [phone] (H)            What medication are you calling about (include dose and sig)?:     Farxiga 10 mg  Pravastatine 40 mg    Preferred Pharmacy:  [facility] [address]  [address]   [address] [address]  Phone: [phone] Fax: [phone]      Controlled Substance Agreement on file:   CSA -- Patient Level:    CSA: None found at the patient level.       Who prescribed the medication?: pcp    Do you need a refill? Yes    When did you use the medication last? 5/16/2024    Patient offered an appointment? Yes: 6/17/2024    Do you have any questions or concerns?  Yes: will run out before appointment needs a month worth      Could we send this information to you in [employer] or would you prefer to receive a phone call?:   Patient would prefer a phone call   Okay to leave a detailed message?: Yes at Home number on file [phone] (home)

## 2024-05-18 ENCOUNTER — HEALTH MAINTENANCE LETTER (OUTPATIENT)
Age: 69
End: 2024-05-18

## 2024-06-05 ENCOUNTER — NURSE TRIAGE (OUTPATIENT)
Dept: NURSING | Facility: CLINIC | Age: 69
End: 2024-06-05
Payer: COMMERCIAL

## 2024-06-05 ENCOUNTER — OFFICE VISIT (OUTPATIENT)
Dept: FAMILY MEDICINE | Facility: CLINIC | Age: 69
End: 2024-06-05
Payer: COMMERCIAL

## 2024-06-05 VITALS
DIASTOLIC BLOOD PRESSURE: 60 MMHG | BODY MASS INDEX: 25.25 KG/M2 | TEMPERATURE: 97.6 F | SYSTOLIC BLOOD PRESSURE: 100 MMHG | HEART RATE: 101 BPM | HEIGHT: 67 IN | RESPIRATION RATE: 16 BRPM | OXYGEN SATURATION: 97 % | WEIGHT: 160.9 LBS
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
## 2024-06-05 DIAGNOSIS — S60.512A CAT SCRATCH OF LEFT HAND, INITIAL ENCOUNTER: Primary | ICD-10-CM

## 2024-06-05 PROCEDURE — 99212 OFFICE O/P EST SF 10 MIN: CPT | Performed by: FAMILY MEDICINE

## 2024-06-05 NOTE — PROGRESS NOTES
"  Assessment & Plan   Problem List Items Addressed This Visit    None  Visit Diagnoses       Cat scratch of left hand, initial encounter    -  Primary             Patient's son has moved in with him.  He has brought his cat home and his cat is only friendly with his son.  Patient was scratched on his hand by the cat and when his wife spoke with nurse triage they recommended that he come in to have it evaluated.  Pictures of the cat scratches were obtained before there are several very shallow abrasions with no evidence of infection.  Reassurance provided.  Return to clinic if symptoms worsen or do not improve.              BMI  Estimated body mass index is 25.2 kg/m  as calculated from the following:    Height as of this encounter: 1.702 m (5' 7\").    Weight as of this encounter: 73 kg (160 lb 14.4 oz).             Serg Joseph is a 68 year old, presenting for the following health issues:  Abrasion (Pt c/o cat scratch on R hand x 3 hours ago. )        6/5/2024    10:05 AM   Additional Questions   Roomed by Ivone MENDEZ                   Objective    /60 (BP Location: Right arm, Patient Position: Sitting)   Pulse 101   Temp 97.6  F (36.4  C) (Tympanic)   Resp 16   Ht 1.702 m (5' 7\")   Wt 73 kg (160 lb 14.4 oz)   SpO2 97%   BMI 25.20 kg/m    Body mass index is 25.2 kg/m .  Physical Exam               Signed Electronically by: Sharla Aguirre MD    "

## 2024-06-05 NOTE — TELEPHONE ENCOUNTER
Pt wife is phoning - pt gave verbal permission to speak with wife about his medical care     Pt was scratched by their indoor cat on his right hand     Pt had a tetanus shot 03/2023     Pt states that it scratch bled for a minute and then stopped - pt cleaned scratch very well     No fever     Per disposition: Go To Office Now     Pt transferred to scheduling and if no appointments available, pt will go to [facility] for evaluation     Care advice given per protocol and when to call back. Pt verbalized understanding and agrees to plan of care.    Lesly Gandara RN  [facility] Nurse Advisor  9:19 AM 6/5/2024        Reason for Disposition   Puncture wound (hole through the skin) from cat (teeth or claws)    Additional Information   Negative: Major bleeding (actively dripping or spurting) that can't be stopped   Negative: Sounds like a life-threatening emergency to the triager   Negative: Any break in skin from BITE (e.g., cut, puncture, or scratch) and WILD animal at risk for RABIES (e.g., bat, raccoon, kwong, skunk, coyote, other carnivores; see Background for list)   Negative: Any break in skin from BITE (e.g., cut, puncture, or scratch) and PET animal (e.g., dog, cat, or ferret) at risk for RABIES (e.g., sick, stray, unprovoked bite, developing country)   Negative: Any break in skin from BITE (e.g., cut, puncture, or scratch) and monkey   Negative: Cut (length > 1/8 inch or 3 mm) or skin tear and any animal  (Exception: Superficial scratch that doesn't go through dermis.)   Negative: Bleeding won't stop after 10 minutes of direct pressure (using correct technique)   Negative: EXPOSURE of non-intact skin (e.g., exposed person has dermatitis, abrasion, wound)  with animal BODY FLUID (e.g., saliva such as licking, blood, brain) and animal at high-risk for RABIES (e.g., bat, raccoon, kwong, skunk, coyote, other carnivores)   Negative: Sounds like a serious bite injury to the triager   Negative: SEVERE pain (e.g.,  excruciating)    Protocols used: Animal Bite-A-OH

## 2024-06-05 NOTE — PROGRESS NOTES
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   Completed radiation therapy on : April 19, 2024        Path Result 01/26/2024  Final Diagnosis   A.SINUS CONTENTS, ETHMOID AND RIGHT INFERIOR TURBINATE:  - Benign respiratory mucosa and turbinate with non-specific chronic inflammation and fibrosis  - No evidence of high grade dysplasia/carcinoma     B. RIGHT SPHENOID SINUS, BIOPSY:  - Negative for high grade dysplasia/carcinoma     C. RIGHT ETHMOID, BIOPSY:  - Negative for high grade dysplasia/carcinoma     D. SINUS CONTENTS, RIGHT POSTERIOR ETHMOID:  - Fragments of inverted squamous papilloma  - Negative for high grade dysplasia/carcinoma     E. SINUS CONTENTS, RIGHT ANTERIOR ETHMOID:  - Fragments of inverted squamous papilloma  - No evidence of high grade dysplasia/carcinoma     F. RIGHT INFRA ORBITAL NERVE, BIOPSY:  - Nerve and bone tissue, negative for carcinoma     G. MAXILLARY SINUS, RIGHT, SIMPLE EXCISION:  - SQUAMOUS CELL CARCINOMA, FOCALLY INVASIVE in a background of granulation tissue and fibrosis  - No perineural or lymphovascular invasion is seen  - Peripheral and deep resection margins are negative  - AJCC Pathologic Stage: pT1     H. ORBITAL FLOOR BONE, BIOPSY:  - Negative for carcinoma     I. SINUS CONTENT, RIGHT ETHMOID AND TURBINATE:  - Fragment of inverted squamous papilloma  - Negative for high grade dysplasia/carcinoma     J. RIGHT MAXILLARY SINUS, POSTERIOR BONY WALL:  - Negative for carcinoma        History of Present Illness: 68-year-old male here in the otolaryngology clinic for tumor surveillance follow-up.  Patient states that he completed radiation therapy on April 19, 2024.  He lost about 10 pounds during the treatment.  He has recovered really well from the radiation therapy.  He states having had a lot of crusting mainly from the right sinonasal passages.  He  is using his saline irrigations in a regular basis which is helping a lot.  He denies any nasal obstruction no epistaxis.  He is tolerating a regular diet with difficulties.  His sense of smell is normal.    MEDICATIONS:     Current Outpatient Medications   Medication Sig Dispense Refill    dapagliflozin (FARXIGA) 10 MG TABS tablet Take 1 tablet (10 mg) by mouth daily 90 tablet 3    fish oil-omega-3 fatty acids 1000 MG capsule Take 2 g by mouth daily (Patient not taking: Reported on 3/7/2024)      gabapentin (NEURONTIN) 300 MG capsule Take 2 capsules (600 mg) by mouth 3 times daily 270 capsule 4    lisinopril-hydrochlorothiazide (ZESTORETIC) 20-12.5 MG tablet Take 1 tablet by mouth daily 90 tablet 3    metFORMIN (GLUCOPHAGE XR) 500 MG 24 hr tablet Take 4 tablets (2,000 mg) by mouth daily 360 tablet 3    pravastatin (PRAVACHOL) 40 MG tablet Take 1 tablet (40 mg) by mouth daily 90 tablet 3    sodium chloride (OCEAN) 0.65 % nasal spray Spray two sprays into both sides of your nose every 3 hours while you are awake 88 mL 3       ALLERGIES:  No Known Allergies    PAST MEDICAL HISTORY:   Past Medical History:   Diagnosis Date    Unspecified essential hypertension     Essential hypertension        FAMILY HISTORY:    Family History   Problem Relation Age of Onset    Hypertension Father     Lung Cancer Father     Diabetes Type 2  Father     Diabetes No family hx of     Eye Disorder No family hx of        REVIEW OF SYSTEMS:  12 point ROS was negative other than the symptoms noted above in the HPI.  PHYSICAL EXAMINATION:      Constitutional:  The patient was unaccompanied, well-groomed, and in no acute distress.     Skin: Normal:  warm and pink without rash   Neurologic: Alert and oriented x 3.  CN's III-XII within normal limits.  Voice normal.    Psychiatric: The patient's affect was calm, cooperative, and appropriate.     Communication:  Normal; communicates verbally, normal voice quality.   Respiratory: Breathing  comfortably without stridor or exertion of accessory muscles.    Head/Face:  Normocephalic and atraumatic.  No lesions or scars. No sinus tenderness.    Salivary glands -  Normal size, no tenderness, swelling, or palpable masses   Eyes: Pupils were equal and reactive.  Extraocular movement intact.         Nose: Sinuses were non-tender.  Anterior rhinoscopy revealed midline septum and absence of purulence or polyps.     Oral Cavity: Normal tongue, floor of mouth, buccal mucosa, and palate.  No lesions or masses on inspection or palpation.     Oropharynx: Normal mucosa, palate symmetric with normal elevation. No abnormal lymph tissue in the oropharynx.             Neck: Supple with normal laryngeal and tracheal landmarks.  The parotid beds were without masses.  No palpable thyroid.  Normal range of motion   Lymphatic: There is no palpable lymphadenopathy in the neck.            Nasal endoscopy: Consent for nasal endoscopy was obtained.  I confirmed correctness of the procedure and identity of the patient.  Nasal endoscopy was indicated due to history of sinonasal malignancy.  The nose was topically decongested and anesthetized.  The nasal endoscope was passed under endoscopic vision.  On the right side there is very superficial crusting all throughout the sinonasal passages.  There is a very large maxillary antrostomy.  I do not see any masses or lesions concerning today.  Sphenoid sinus is open.  No purulence.  The left side the septum is in the midline.  Inferior middle turbinate and middle and inferior meatus are within normal limits and the nasopharynx is normal.       IMPRESSION AND PLAN: PET-CT and  sinus MRI w/wo contrast around July 19, 2024 and follow up July 25      Karina Kumar MD, M.S.  Otolaryngology- Head & Neck Surgery  [phone]

## 2024-06-06 ENCOUNTER — OFFICE VISIT (OUTPATIENT)
Dept: OTOLARYNGOLOGY | Facility: CLINIC | Age: 69
End: 2024-06-06
Payer: COMMERCIAL

## 2024-06-06 VITALS
SYSTOLIC BLOOD PRESSURE: 135 MMHG | OXYGEN SATURATION: 97 % | DIASTOLIC BLOOD PRESSURE: 90 MMHG | HEIGHT: 67 IN | HEART RATE: 101 BPM | BODY MASS INDEX: 25.35 KG/M2 | WEIGHT: 161.5 LBS

## 2024-06-06 DIAGNOSIS — C31.9 MALIGNANT NEOPLASM OF ACCESSORY SINUS (H): Primary | ICD-10-CM

## 2024-06-06 PROCEDURE — 31231 NASAL ENDOSCOPY DX: CPT | Performed by: OTOLARYNGOLOGY

## 2024-06-06 PROCEDURE — 99214 OFFICE O/P EST MOD 30 MIN: CPT | Mod: 25 | Performed by: OTOLARYNGOLOGY

## 2024-06-06 RX ORDER — IBUPROFEN 800 MG/1
900 TABLET, FILM COATED ORAL EVERY 8 HOURS PRN
COMMUNITY
End: 2024-08-12

## 2024-06-06 ASSESSMENT — PAIN SCALES - GENERAL: PAINLEVEL: SEVERE PAIN (6)

## 2024-06-06 NOTE — LETTER
6/6/2024       RE: Opal Benitez  [address] [address]     Dear Colleague,    Thank you for referring your patient, Opal Benitez, to the [facility] [place] at [facility]. Please see a copy of my visit note below.    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   Completed radiation therapy on : April 19, 2024        Path Result 01/26/2024  Final Diagnosis   A.SINUS CONTENTS, ETHMOID AND RIGHT INFERIOR TURBINATE:  - Benign respiratory mucosa and turbinate with non-specific chronic inflammation and fibrosis  - No evidence of high grade dysplasia/carcinoma     B. RIGHT SPHENOID SINUS, BIOPSY:  - Negative for high grade dysplasia/carcinoma     C. RIGHT ETHMOID, BIOPSY:  - Negative for high grade dysplasia/carcinoma     D. SINUS CONTENTS, RIGHT POSTERIOR ETHMOID:  - Fragments of inverted squamous papilloma  - Negative for high grade dysplasia/carcinoma     E. SINUS CONTENTS, RIGHT ANTERIOR ETHMOID:  - Fragments of inverted squamous papilloma  - No evidence of high grade dysplasia/carcinoma     F. RIGHT INFRA ORBITAL NERVE, BIOPSY:  - Nerve and bone tissue, negative for carcinoma     G. MAXILLARY SINUS, RIGHT, SIMPLE EXCISION:  - SQUAMOUS CELL CARCINOMA, FOCALLY INVASIVE in a background of granulation tissue and fibrosis  - No perineural or lymphovascular invasion is seen  - Peripheral and deep resection margins are negative  - AJCC Pathologic Stage: pT1     H. ORBITAL FLOOR BONE, BIOPSY:  - Negative for carcinoma     I. SINUS CONTENT, RIGHT ETHMOID AND TURBINATE:  - Fragment of inverted squamous papilloma  - Negative for high grade dysplasia/carcinoma     J. RIGHT MAXILLARY SINUS, POSTERIOR BONY WALL:  - Negative for carcinoma        History of Present Illness: 68-year-old male here in the  otolaryngology clinic for tumor surveillance follow-up.  Patient states that he completed radiation therapy on April 19, 2024.  He lost about 10 pounds during the treatment.  He has recovered really well from the radiation therapy.  He states having had a lot of crusting mainly from the right sinonasal passages.  He is using his saline irrigations in a regular basis which is helping a lot.  He denies any nasal obstruction no epistaxis.  He is tolerating a regular diet with difficulties.  His sense of smell is normal.    MEDICATIONS:     Current Outpatient Medications   Medication Sig Dispense Refill     dapagliflozin (FARXIGA) 10 MG TABS tablet Take 1 tablet (10 mg) by mouth daily 90 tablet 3     fish oil-omega-3 fatty acids 1000 MG capsule Take 2 g by mouth daily (Patient not taking: Reported on 3/7/2024)       gabapentin (NEURONTIN) 300 MG capsule Take 2 capsules (600 mg) by mouth 3 times daily 270 capsule 4     lisinopril-hydrochlorothiazide (ZESTORETIC) 20-12.5 MG tablet Take 1 tablet by mouth daily 90 tablet 3     metFORMIN (GLUCOPHAGE XR) 500 MG 24 hr tablet Take 4 tablets (2,000 mg) by mouth daily 360 tablet 3     pravastatin (PRAVACHOL) 40 MG tablet Take 1 tablet (40 mg) by mouth daily 90 tablet 3     sodium chloride (OCEAN) 0.65 % nasal spray Spray two sprays into both sides of your nose every 3 hours while you are awake 88 mL 3       ALLERGIES:  No Known Allergies    PAST MEDICAL HISTORY:   Past Medical History:   Diagnosis Date     Unspecified essential hypertension     Essential hypertension        FAMILY HISTORY:    Family History   Problem Relation Age of Onset     Hypertension Father      Lung Cancer Father      Diabetes Type 2  Father      Diabetes No family hx of      Eye Disorder No family hx of        REVIEW OF SYSTEMS:  12 point ROS was negative other than the symptoms noted above in the HPI.  PHYSICAL EXAMINATION:      Constitutional:  The patient was unaccompanied, well-groomed, and in no acute  distress.     Skin: Normal:  warm and pink without rash   Neurologic: Alert and oriented x 3.  CN's III-XII within normal limits.  Voice normal.    Psychiatric: The patient's affect was calm, cooperative, and appropriate.     Communication:  Normal; communicates verbally, normal voice quality.   Respiratory: Breathing comfortably without stridor or exertion of accessory muscles.    Head/Face:  Normocephalic and atraumatic.  No lesions or scars. No sinus tenderness.    Salivary glands -  Normal size, no tenderness, swelling, or palpable masses   Eyes: Pupils were equal and reactive.  Extraocular movement intact.         Nose: Sinuses were non-tender.  Anterior rhinoscopy revealed midline septum and absence of purulence or polyps.     Oral Cavity: Normal tongue, floor of mouth, buccal mucosa, and palate.  No lesions or masses on inspection or palpation.     Oropharynx: Normal mucosa, palate symmetric with normal elevation. No abnormal lymph tissue in the oropharynx.             Neck: Supple with normal laryngeal and tracheal landmarks.  The parotid beds were without masses.  No palpable thyroid.  Normal range of motion   Lymphatic: There is no palpable lymphadenopathy in the neck.            Nasal endoscopy: Consent for nasal endoscopy was obtained.  I confirmed correctness of the procedure and identity of the patient.  Nasal endoscopy was indicated due to history of sinonasal malignancy.  The nose was topically decongested and anesthetized.  The nasal endoscope was passed under endoscopic vision.  On the right side there is very superficial crusting all throughout the sinonasal passages.  There is a very large maxillary antrostomy.  I do not see any masses or lesions concerning today.  Sphenoid sinus is open.  No purulence.  The left side the septum is in the midline.  Inferior middle turbinate and middle and inferior meatus are within normal limits and the nasopharynx is normal.       IMPRESSION AND PLAN: PET-CT and   sinus MRI w/wo contrast around July 19, 2024 and follow up July 25      Karina Kumar MD, M.S.  Otolaryngology- Head & Neck Surgery  [phone]

## 2024-06-06 NOTE — PATIENT INSTRUCTIONS
You were seen in the ENT Clinic today by Dr. Kumar. If you have any questions or concerns after your appointment, please contact us (see below)    The following has been recommended for you based upon your appointment today:  PET CT and MRI end of July     Please return to clinic on July 25th to follow up with Dr. Cole     How to Contact Us:  Send a BackOps message to your provider. Our team will respond to you via BackOps. Occasionally, we will need to call you to get further information.  For urgent matters (Monday-Friday), call the ENT Clinic: [phone] and speak with a call center team member - they will route your call appropriately.   If you'd like to speak directly with a nurse, please find our contact information below. We do our best to check voicemail frequently throughout the day, and will work to call you back within 1-2 days. For urgent matters, please use the general clinic phone numbers listed above.    Laura BAXTER RN, BSN  RN Care Coordinator, ENT Clinic  [facility]  Direct: [phone]

## 2024-06-07 ENCOUNTER — TELEPHONE (OUTPATIENT)
Dept: OTOLARYNGOLOGY | Facility: CLINIC | Age: 69
End: 2024-06-07
Payer: COMMERCIAL

## 2024-06-07 NOTE — TELEPHONE ENCOUNTER
Patient confirmed scheduled appointment:  Date: 7/25  Time: 3:40pm  Visit type: Return ENT   Provider: Dr. Cole  Location: [facility]  Testing/imaging: PET and MRI prior  Additional notes:

## 2024-06-17 ENCOUNTER — OFFICE VISIT (OUTPATIENT)
Dept: FAMILY MEDICINE | Facility: CLINIC | Age: 69
End: 2024-06-17
Payer: COMMERCIAL

## 2024-06-17 VITALS
HEART RATE: 88 BPM | DIASTOLIC BLOOD PRESSURE: 67 MMHG | SYSTOLIC BLOOD PRESSURE: 101 MMHG | WEIGHT: 154 LBS | HEIGHT: 67 IN | BODY MASS INDEX: 24.17 KG/M2 | RESPIRATION RATE: 20 BRPM | TEMPERATURE: 96.2 F

## 2024-06-17 DIAGNOSIS — I10 PRIMARY HYPERTENSION: ICD-10-CM

## 2024-06-17 DIAGNOSIS — R91.1 NODULE OF APEX OF LEFT LUNG: ICD-10-CM

## 2024-06-17 DIAGNOSIS — E78.5 DYSLIPIDEMIA: ICD-10-CM

## 2024-06-17 DIAGNOSIS — C31.9 MALIGNANT NEOPLASM OF PARANASAL SINUS (H): ICD-10-CM

## 2024-06-17 DIAGNOSIS — Z00.00 ROUTINE GENERAL MEDICAL EXAMINATION AT A HEALTH CARE FACILITY: Primary | ICD-10-CM

## 2024-06-17 DIAGNOSIS — E11.9 TYPE 2 DIABETES MELLITUS WITHOUT COMPLICATION, WITHOUT LONG-TERM CURRENT USE OF INSULIN (H): ICD-10-CM

## 2024-06-17 LAB
ALBUMIN SERPL BCG-MCNC: 4.2 G/DL (ref 3.5–5.2)
ALP SERPL-CCNC: 76 U/L (ref 40–150)
ALT SERPL W P-5'-P-CCNC: 8 U/L (ref 0–70)
ANION GAP SERPL CALCULATED.3IONS-SCNC: 12 MMOL/L (ref 7–15)
AST SERPL W P-5'-P-CCNC: 18 U/L (ref 0–45)
BILIRUB SERPL-MCNC: 1 MG/DL
BUN SERPL-MCNC: 34.9 MG/DL (ref 8–23)
CALCIUM SERPL-MCNC: 9.4 MG/DL (ref 8.8–10.2)
CHLORIDE SERPL-SCNC: 98 MMOL/L (ref 98–107)
CHOLEST SERPL-MCNC: 113 MG/DL
CREAT SERPL-MCNC: 1.11 MG/DL (ref 0.67–1.17)
CREAT UR-MCNC: 248 MG/DL
DEPRECATED HCO3 PLAS-SCNC: 25 MMOL/L (ref 22–29)
EGFRCR SERPLBLD CKD-EPI 2021: 73 ML/MIN/1.73M2
ERYTHROCYTE [DISTWIDTH] IN BLOOD BY AUTOMATED COUNT: 12.9 % (ref 10–15)
FASTING STATUS PATIENT QL REPORTED: YES
FASTING STATUS PATIENT QL REPORTED: YES
GLUCOSE SERPL-MCNC: 152 MG/DL (ref 70–99)
HBA1C MFR BLD: 6.8 % (ref 0–5.6)
HCT VFR BLD AUTO: 45.1 % (ref 40–53)
HDLC SERPL-MCNC: 46 MG/DL
HGB BLD-MCNC: 14.5 G/DL (ref 13.3–17.7)
LDLC SERPL CALC-MCNC: 47 MG/DL
MCH RBC QN AUTO: 29.7 PG (ref 26.5–33)
MCHC RBC AUTO-ENTMCNC: 32.2 G/DL (ref 31.5–36.5)
MCV RBC AUTO: 92 FL (ref 78–100)
MICROALBUMIN UR-MCNC: 34 MG/L
MICROALBUMIN/CREAT UR: 13.71 MG/G CR (ref 0–17)
NONHDLC SERPL-MCNC: 67 MG/DL
PLATELET # BLD AUTO: 292 10E3/UL (ref 150–450)
POTASSIUM SERPL-SCNC: 4.6 MMOL/L (ref 3.4–5.3)
PROT SERPL-MCNC: 8.2 G/DL (ref 6.4–8.3)
RBC # BLD AUTO: 4.89 10E6/UL (ref 4.4–5.9)
SODIUM SERPL-SCNC: 135 MMOL/L (ref 135–145)
TRIGL SERPL-MCNC: 98 MG/DL
WBC # BLD AUTO: 5.9 10E3/UL (ref 4–11)

## 2024-06-17 PROCEDURE — 82043 UR ALBUMIN QUANTITATIVE: CPT | Performed by: INTERNAL MEDICINE

## 2024-06-17 PROCEDURE — 36415 COLL VENOUS BLD VENIPUNCTURE: CPT | Performed by: INTERNAL MEDICINE

## 2024-06-17 PROCEDURE — 85027 COMPLETE CBC AUTOMATED: CPT | Performed by: INTERNAL MEDICINE

## 2024-06-17 PROCEDURE — 99214 OFFICE O/P EST MOD 30 MIN: CPT | Mod: 25 | Performed by: INTERNAL MEDICINE

## 2024-06-17 PROCEDURE — 80061 LIPID PANEL: CPT | Performed by: INTERNAL MEDICINE

## 2024-06-17 PROCEDURE — 83036 HEMOGLOBIN GLYCOSYLATED A1C: CPT | Performed by: INTERNAL MEDICINE

## 2024-06-17 PROCEDURE — 80053 COMPREHEN METABOLIC PANEL: CPT | Performed by: INTERNAL MEDICINE

## 2024-06-17 PROCEDURE — 82570 ASSAY OF URINE CREATININE: CPT | Performed by: INTERNAL MEDICINE

## 2024-06-17 PROCEDURE — 99397 PER PM REEVAL EST PAT 65+ YR: CPT | Performed by: INTERNAL MEDICINE

## 2024-06-17 PROCEDURE — 99207 PR FOOT EXAM NO CHARGE: CPT | Performed by: INTERNAL MEDICINE

## 2024-06-17 RX ORDER — LISINOPRIL AND HYDROCHLOROTHIAZIDE 12.5; 2 MG/1; MG/1
1 TABLET ORAL DAILY
Qty: 90 TABLET | Refills: 3 | Status: SHIPPED | OUTPATIENT
Start: 2024-06-17

## 2024-06-17 RX ORDER — PRAVASTATIN SODIUM 40 MG
40 TABLET ORAL DAILY
Qty: 90 TABLET | Refills: 3 | Status: SHIPPED | OUTPATIENT
Start: 2024-06-17

## 2024-06-17 RX ORDER — METFORMIN HCL 500 MG
2000 TABLET, EXTENDED RELEASE 24 HR ORAL DAILY
Qty: 360 TABLET | Refills: 3 | Status: SHIPPED | OUTPATIENT
Start: 2024-06-17

## 2024-06-17 RX ORDER — DAPAGLIFLOZIN 10 MG/1
10 TABLET, FILM COATED ORAL DAILY
Qty: 90 TABLET | Refills: 3 | Status: SHIPPED | OUTPATIENT
Start: 2024-06-17

## 2024-06-17 SDOH — HEALTH STABILITY: PHYSICAL HEALTH: ON AVERAGE, HOW MANY DAYS PER WEEK DO YOU ENGAGE IN MODERATE TO STRENUOUS EXERCISE (LIKE A BRISK WALK)?: 6 DAYS

## 2024-06-17 ASSESSMENT — SOCIAL DETERMINANTS OF HEALTH (SDOH): HOW OFTEN DO YOU GET TOGETHER WITH FRIENDS OR RELATIVES?: THREE TIMES A WEEK

## 2024-06-17 NOTE — PATIENT INSTRUCTIONS
"Patient Education   Preventive Care Advice   This is general advice we often give to help people stay healthy. Your care team may have specific advice just for you. Please talk to your care team about your own preventive care needs.  Lifestyle  Exercise at least 150 minutes each week (30 minutes a day, 5 days a week).  Do muscle strengthening activities 2 days a week. These help control your weight and prevent disease.  No smoking.  Wear sunscreen to prevent skin cancer.  Have your home tested for radon every 2 to 5 years. Radon is a colorless, odorless gas that can harm your lungs. To learn more, go to www.health.[facility].[place].us and search for \"Radon in Homes.\"  Keep guns unloaded and locked up in a safe place like a safe or gun vault, or, use a gun lock and hide the keys. Always lock away bullets separately. To learn more, visit [employer].gov and search for \"safe gun storage.\"  Nutrition  Eat 5 or more servings of fruits and vegetables each day.  Try wheat bread, brown rice and whole grain pasta (instead of white bread, rice, and pasta).  Get enough calcium and vitamin D. Check the label on foods and aim for 100% of the RDA (recommended daily allowance).  Regular exams  Have a dental exam and cleaning every 6 months.  See your health care team every year to talk about:  Any changes in your health.  Any medicines your care team has prescribed.  Preventive care, family planning, and ways to prevent chronic diseases.  Shots (vaccines)   HPV shots (up to age 26), if you've never had them before.  Hepatitis B shots (up to age 59), if you've never had them before.  COVID-19 shot: Get this shot when it's due.  Flu shot: Get a flu shot every year.  Tetanus shot: Get a tetanus shot every 10 years.  Pneumococcal, hepatitis A, and RSV shots: Ask your care team if you need these based on your risk.  Shingles shot (for age 50 and up).  General health tests  Diabetes screening:  Starting at age 35, Get screened for diabetes at least " every 3 years.  If you are younger than age 35, ask your care team if you should be screened for diabetes.  Cholesterol test: At age 39, start having a cholesterol test every 5 years, or more often if advised.  Bone density scan (DEXA): At age 50, ask your care team if you should have this scan for osteoporosis (brittle bones).  Hepatitis C: Get tested at least once in your life.  Abdominal aortic aneurysm screening: Talk to your doctor about having this screening if you:  Have ever smoked; and  Are biologically male; and  Are between the ages of 65 and 75.  STIs (sexually transmitted infections)  Before age 24: Ask your care team if you should be screened for STIs.  After age 24: Get screened for STIs if you're at risk. You are at risk for STIs (including HIV) if:  You are sexually active with more than one person.  You don't use condoms every time.  You or a partner was diagnosed with a sexually transmitted infection.  If you are at risk for HIV, ask about PrEP medicine to prevent HIV.  Get tested for HIV at least once in your life, whether you are at risk for HIV or not.  Cancer screening tests  Cervical cancer screening: If you have a cervix, begin getting regular cervical cancer screening tests at age 21. Most people who have regular screenings with normal results can stop after age 65. Talk about this with your provider.  Breast cancer scan (mammogram): If you've ever had breasts, begin having regular mammograms starting at age 40. This is a scan to check for breast cancer.  Colon cancer screening: It is important to start screening for colon cancer at age 45.  Have a colonoscopy test every 10 years (or more often if you're at risk) Or, ask your provider about stool tests like a FIT test every year or Cologuard test every 3 years.  To learn more about your testing options, visit: www.[employer]/140927.pdf.  For help making a decision, visit: nessa/ft86787.  Prostate cancer screening test: If you have a  prostate and are age 55 to 69, ask your provider if you would benefit from a yearly prostate cancer screening test.  Lung cancer screening: If you are a current or former smoker age 50 to 80, ask your care team if ongoing lung cancer screenings are right for you.  For informational purposes only. Not to replace the advice of your health care provider. Copyright   2023 [facility]. All rights reserved. Clinically reviewed by the  [employer] [facility]. Yo [id number] - REV 04/24.  Learning About Sleeping Well  What does sleeping well mean?     Sleeping well means getting enough sleep to feel good and stay healthy. How much sleep is enough varies among people.  The number of hours you sleep and how you feel when you wake up are both important. If you do not feel refreshed, you probably need more sleep. Another sign of not getting enough sleep is feeling tired during the day.  Experts recommend that adults get at least 7 or more hours of sleep per day. Children and older adults need more sleep.  Why is getting enough sleep important?  Getting enough quality sleep is a basic part of good health. When your sleep suffers, your physical health, mood, and your thoughts can suffer too. You may find yourself feeling more grumpy or stressed. Not getting enough sleep also can lead to serious problems, including injury, accidents, anxiety, and depression.  What might cause poor sleeping?  Many things can cause sleep problems, including:  Changes to your sleep schedule.  Stress. Stress can be caused by fear about a single event, such as giving a speech. Or you may have ongoing stress, such as worry about work or school.  Depression, anxiety, and other mental or emotional conditions.  Changes in your sleep habits or surroundings. This includes changes that happen where you sleep, such as noise, light, or sleeping in a different bed. It also includes changes in your sleep pattern, such as having jet  "lag or working a late shift.  Health problems, such as pain, breathing problems, and restless legs syndrome.  Lack of regular exercise.  Using alcohol, nicotine, or caffeine before bed.  How can you help yourself?  Here are some tips that may help you sleep more soundly and wake up feeling more refreshed.  Your sleeping area   Use your bedroom only for sleeping and sex. A bit of light reading may help you fall asleep. But if it doesn't, do your reading elsewhere in the house. Try not to use your TV, computer, smartphone, or tablet while you are in bed.  Be sure your bed is big enough to stretch out comfortably, especially if you have a sleep partner.  Keep your bedroom quiet, dark, and cool. Use curtains, blinds, or a sleep mask to block out light. To block out noise, use earplugs, soothing music, or a \"white noise\" machine.  Your evening and bedtime routine   Create a relaxing bedtime routine. You might want to take a warm shower or bath, or listen to soothing music.  Go to bed at the same time every night. And get up at the same time every morning, even if you feel tired.  What to avoid   Limit caffeine (coffee, tea, caffeinated sodas) during the day, and don't have any for at least 6 hours before bedtime.  Avoid drinking alcohol before bedtime. Alcohol can cause you to wake up more often during the night.  Try not to smoke or use tobacco, especially in the evening. Nicotine can keep you awake.  Limit naps during the day, especially close to bedtime.  Avoid lying in bed awake for too long. If you can't fall asleep or if you wake up in the middle of the night and can't get back to sleep within about 20 minutes, get out of bed and go to another room until you feel sleepy.  Avoid taking medicine right before bed that may keep you awake or make you feel hyper or energized. Your doctor can tell you if your medicine may do this and if you can take it earlier in the day.  If you can't sleep   Imagine yourself in a " "peaceful, pleasant scene. Focus on the details and feelings of being in a place that is relaxing.  Get up and do a quiet or boring activity until you feel sleepy.  Avoid drinking any liquids before going to bed to help prevent waking up often to use the bathroom.  Where can you learn more?  Go to https://www.[employer].net/patiented  Enter J942 in the search box to learn more about \"Learning About Sleeping Well.\"  Current as of: July 10, 2023               Content Version: 14.0    9258-0499 [employer].   Care instructions adapted under license by your healthcare professional. If you have questions about a medical condition or this instruction, always ask your healthcare professional. [employer] disclaims any warranty or liability for your use of this information.         "

## 2024-06-17 NOTE — ASSESSMENT & PLAN NOTE
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

## 2024-06-17 NOTE — PROGRESS NOTES
Preventive Care Visit  [facility]  Aiden Drummond MD, Internal Medicine  Jun 17, 2024      Assessment & Plan   Problem List Items Addressed This Visit       Primary hypertension     Controlled with lisinopril 20/HCTZ 12.5 daily         Relevant Medications    lisinopril-hydrochlorothiazide (ZESTORETIC) 20-12.5 MG tablet    Other Relevant Orders    Comprehensive metabolic panel (BMP + Alb, Alk Phos, ALT, AST, Total. Bili, TP)    Type 2 diabetes mellitus without complication, without long-term current use of insulin (H)     On metformin XR 2000 mg daily and dapagliflozin 10 mg daily   On statin and ACE  diabetes is improving/stable/worsening: unchanged.  Medication changes per orders.  Diabetes will be reassessed in 6 months.         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 Bili, TP)    CBC with platelets    Nodule of apex of left lung     1.1 cm incidental nodule on PET/CT with uptake 12/29/23  Patient watched on CT 4/1/2027  Contemplating excision with his oncology team          Other Visit Diagnoses       Routine general medical examination at a health care facility    -  Primary                    Counseling  Appropriate preventive  services were discussed with this patient, including applicable screening as appropriate for fall prevention, nutrition, physical activity, Tobacco-use cessation, weight loss and cognition.  Checklist reviewing preventive services available has been given to the patient.  Reviewed patient's diet, addressing concerns and/or questions.   Discussed possible causes of fatigue.   Follow-up in 6 months      Serg Joseph is a 68 year old, presenting for the following:  Physical (Pt here for Px and fasting labwork.  This is not a medicare Px)        6/17/2024     7:48 AM   Additional Questions   Roomed by [facility]        Health Care Directive  Patient does not have a Health Care Directive or Living Will: Discussed advance care planning with patient; information given to patient to review.    No hearing test performed.  Pt has hearing aids        HPI  Recovering from his sinus surgery and radiation for his tumor.  Radiation was more difficult than he expected.  Poor appetite and 10 pound weight loss.  Taste and smell not normal.  Still has some discomfort of the nose.  This team contemplating excision of the pulmonary nodule after it was unchanged on CT.  Has a pet CT next month.  Hypoglycemia.    Diabetes Follow-up    How often are you checking your blood sugar? Not at all  What concerns do you have today about your diabetes? None   Do you have any of these symptoms? (Select all that apply)  No numbness or tingling in feet.  No redness, sores or blisters on feet.  No complaints of excessive thirst.  No reports of blurry vision.  No significant changes to weight.      BP Readings from Last 2 Encounters:   06/17/24 101/67   06/06/24 (!) 135/90     Hemoglobin A1C (%)   Date Value   01/22/2024 7.2 (H)   10/09/2023 7.7 (H)     LDL Cholesterol Calculated (mg/dL)   Date Value   03/31/2023 40   06/13/2022 37             Hypertension Follow-up    Do you check your blood pressure regularly outside of the clinic? Yes   Are you  following a low salt diet? Yes  Are your blood pressures ever more than 140 on the top number (systolic) OR more   than 90 on the bottom number (diastolic), for example 140/90? No        6/17/2024   General Health   How would you rate your overall physical health? (!) FAIR   Feel stress (tense, anxious, or unable to sleep) To some extent   (!) STRESS CONCERN      6/17/2024   Nutrition   Diet: Regular (no restrictions)         6/17/2024   Exercise   Days per week of moderate/strenous exercise 6 days         6/17/2024   Social Factors   Frequency of gathering with friends or relatives Three times a week   Worry food won't last until get money to buy more No   Food not last or not have enough money for food? No   Do you have housing?  Yes   Are you worried about losing your housing? No   Lack of transportation? No   Unable to get utilities (heat,electricity)? No         6/17/2024   Fall Risk   Fallen 2 or more times in the past year? No   Trouble with walking or balance? No          6/17/2024   Activities of Daily Living- Home Safety   Needs help with the following daily activites None of the above   Safety concerns in the home None of the above         6/17/2024   Dental   Dentist two times every year? Yes         6/17/2024   Hearing Screening   Hearing concerns? None of the above         6/17/2024   Driving Risk Screening   Patient/family members have concerns about driving No         6/17/2024   General Alertness/Fatigue Screening   Have you been more tired than usual lately? (!) YES         6/17/2024   Urinary Incontinence Screening   Bothered by leaking urine in past 6 months No         6/17/2024   TB Screening   Were you born outside of the [place]? Yes         Today's PHQ-2 Score:       6/17/2024     7:43 AM   PHQ-2 ( 1999 [employer])   Q1: Little interest or pleasure in doing things 1   Q2: Feeling down, depressed or hopeless 1   PHQ-2 Score 2   Q1: Little interest or pleasure in doing things Several days   Q2: Feeling  down, depressed or hopeless Several days   PHQ-2 Score 2           6/17/2024   Substance Use   Alcohol more than 3/day or more than 7/wk No   Do you have a current opioid prescription? No   How severe/bad is pain from 1 to 10? 4/10   Do you use any other substances recreationally? No     Social History     Tobacco Use    Smoking status: Never     Passive exposure: Never    Smokeless tobacco: Never    Tobacco comments:     states he never has smokes   Vaping Use    Vaping status: Never Used   Substance Use Topics    Alcohol use: Yes     Alcohol/week: 6.0 standard drinks of alcohol     Types: 6 Cans of beer per week     Comment: occasional beer    Drug use: Never           6/17/2024   AAA Screening   Family history of Abdominal Aortic Aneurysm (AAA)? No   Last PSA:   Prostate Specific Antigen Screen   Date Value Ref Range Status   03/31/2023 1.18 0.00 - 4.50 ng/mL Final   12/29/2021 1.16 < OR = 4.00 ng/mL Final     Comment:     The total PSA value from this assay system is   standardized against the WHO standard. The test   result will be approximately 20% lower when compared   to the equimolar-standardized total PSA (Mateo   Melanie). Comparison of serial PSA results should be   interpreted with this fact in mind.     This test was performed using the Siemens   chemiluminescent method. Values obtained from   different assay methods cannot be used  interchangeably. PSA levels, regardless of  value, should not be interpreted as absolute  evidence of the presence or absence of disease.  Lab test performed by:  Lab Mnem[facility]  [facility]  [address] [place] [address]  Leonides Seaman M.D.  QUEST Specimen received date and time: 30-DEC-2021 02:55:00.00       ASCVD Risk   The ASCVD Risk score (Ladarius PETERSON, et al., 2019) failed to calculate for the following reasons:    The valid total cholesterol range is 130 to 320 mg/dL            Reviewed and updated as needed this visit by  Provider                    Past Medical History:   Diagnosis Date    Hearing aid worn     Unspecified essential hypertension     Essential hypertension     Past Surgical History:   Procedure Laterality Date    COLOGUARD(EXACT SCIENCES)  12/10/2018    COLOGUARD(EXACT SCIENCES)  11/18/2015    CYSTOSCOPY  2000    OPTICAL TRACKING SYSTEM ENDOSCOPIC SINUS SURGERY N/A 1/26/2024    Procedure: Stealth guided transnasal approach to resect sinonasal tumor with Rivero-Chito approach.;  Surgeon: Karina Kumar MD;  Location:  OR     Current providers sharing in care for this patient include:  Patient Care Team:  Aiden Drummond MD as PCP - General (Internal Medicine)  Aiden Drummond MD as Assigned PCP  Karina Kumar MD as MD (Otolaryngology)  Karina Kumar MD as Assigned Surgical Provider  Johana Galeas MD as MD (Radiation Oncology)  Bernardino Singer MD as MD (Cardiovascular & Thoracic Surgery)  Bernardino Singer MD as Assigned Heart and Vascular Provider  Johana Galeas MD as Assigned Cancer Care Provider    The following health maintenance items are reviewed in Epic and correct as of today:  Health Maintenance   Topic Date Due    ZOSTER IMMUNIZATION (1 of 2) Never done    RSV VACCINE (Pregnancy & 60+) (1 - 1-dose 60+ series) Never done    COVID-19 Vaccine (7 - 2023-24 season) 12/04/2023    MEDICARE ANNUAL WELLNESS VISIT  03/31/2024    LIPID  03/31/2024    MICROALBUMIN  03/31/2024    DIABETIC FOOT EXAM  03/31/2024    ANNUAL REVIEW OF HM ORDERS  03/31/2024    A1C  04/22/2024    EYE EXAM  07/20/2024    BMP  01/22/2025    COLORECTAL CANCER SCREENING  04/06/2025    FALL RISK ASSESSMENT  06/17/2025    ADVANCE CARE PLANNING  01/22/2029    DTAP/TDAP/TD IMMUNIZATION (4 - Td or Tdap) 03/31/2033    HEPATITIS C SCREENING  Completed    PHQ-2 (once per calendar year)  Completed    INFLUENZA VACCINE  Completed    Pneumococcal Vaccine: 65+ Years  Completed    IPV  "IMMUNIZATION  Aged Out    HPV IMMUNIZATION  Aged Out    MENINGITIS IMMUNIZATION  Aged Out    RSV MONOCLONAL ANTIBODY  Aged Out         Review of Systems  Constitutional, neuro, ENT, endocrine, pulmonary, cardiac, gastrointestinal, genitourinary, musculoskeletal, integument and psychiatric systems are negative, except as otherwise noted.     Objective    Exam  /67 (BP Location: Right arm, Patient Position: Sitting, Cuff Size: Adult Regular)   Pulse 88   Temp (!) 96.2  F (35.7  C) (Tympanic)   Resp 20   Ht 1.689 m (5' 6.5\")   Wt 69.9 kg (154 lb)   BMI 24.48 kg/m     Estimated body mass index is 24.48 kg/m  as calculated from the following:    Height as of this encounter: 1.689 m (5' 6.5\").    Weight as of this encounter: 69.9 kg (154 lb).    Physical Exam  Healthy-appearing man in no distress  HEENT exam unremarkable  Eyes normal  Neck supple no adenopathy or thyromegaly  Lungs clear  Cardiac exam regular no murmur or edema, normal carotid pulsations  Abdomen soft and nontender no paraspinal megaly  Alert, oriented, speech and cognition intact, cranial nerves normal, strength and gait normal  Normal mood and affect  Feet in good condition with intact sensation to monofilament and vibration as well as pedal and posterior tibial pulsations.         No data to display                      Signed Electronically by: Aiden Drummond MD    "

## 2024-06-17 NOTE — ASSESSMENT & PLAN NOTE
Controlled by pravastatin 40 mg daily   Patient arrived to [facility] room 28 in stable condition. Two nurse skin assessment completed by Valeri Jackson RN and Doreen Huntley. Head to toe assessment completed.

## 2024-07-11 NOTE — PROCEDURES
Radiotherapy Treatment Summary          Date of Report: May 16, 2024     PATIENT: YONATHAN KENT  MEDICAL RECORD NO: [id number]  : 1955     DIAGNOSIS: C31.1 Malignant neoplasm of ethmoidal sinus  INTENT OF RADIOTHERAPY: Cure  PATHOLOGY: right sinonasal squamous cell carcinoma  STAGE: pT3 N0 M0, pT1N0  CONCURRENT SYSTEMIC THERAPY: no     Details of the treatments summarized below are found in records kept in the Department of Radiation Oncology at [facility].     Treatment Summary:  Treatment Site            Current Dose Modality       From                To                     Elapsed Days  Fx.  Sinus and R neck      6,000 cGy 06 X     3/11/2024       2024              39             30          Dose per Fraction:   Sinus and R neck 200cGy  PTV 54  180cGy     COMMENTS:   Mr. Kent is a 67yo man with squamous cell carcinoma of the sinus which presented with nasal congestion for   which he underwent right maxillary antrostomy, right total ethmoidectomy and right sphenoidotomy (10-26-  23). Pathology showed invasive SCC in a background of inverted squamous papilloma. Imaging showed   enhancing mass of the right lower posterior ethmoid air cells extending to the superior right maxillary sinus   along the inferior and medial right orbital wall, measuring 4.1 x 1.2 cm. There was osseous invasion of the   infraorbital foramen and invasion of the right infraorbial nerve, abutment of the right inferior and medial rectus   muscles. Biopsies showed inverted squamous papilloma with the right maxillary sinus showing focally invasive   squamous cell carcinoma without PNI or LVSI and negative margins. Radiation was recommended for local   control.     The sinus and nasal cavity received 60Gy and the right neck received 54Gy. Both areas were treated in 30 daily   fractions with 6MV photons using tomotherapy technique with one arc. During treatment, he developed pain   which was controlled with gabapentin. His dry mouth was  managed with salt-soda rinses. For dry eye, we   recommended artificial tears. For mucositis, we recommended saline irrigation. For dermatitis, he used   Aquaphor. He tolerated treatment well without other issues.     ED visits/hospitalizations: no  Missed treatments: none  Acute Toxicity Profile by CTC v5.0: grade 2 anorexia, grade 1 fatigue, grade 1 nausea, grade 1 pain, grade 1   dry mouth, grade 1 dysphagia, grade 2 mucositis, grade 2 dermatitis     PAIN MANAGEMENT: gabapentin and Tylenol prn  FOLLOW UP PLAN: See Dr. Cole on 6-6-24     Resident Physician: Eva Huerta M.D.  Staff Physician: Johana Galeas M.D.     CC: Karina Kumar MD                                  Radiation Oncology:  [facility] [address], [address] [place] [id number]

## 2024-07-17 ENCOUNTER — HOSPITAL ENCOUNTER (OUTPATIENT)
Dept: PET IMAGING | Facility: CLINIC | Age: 69
Discharge: HOME | End: 2024-07-17
Attending: OTOLARYNGOLOGY
Payer: COMMERCIAL

## 2024-07-17 ENCOUNTER — HOSPITAL ENCOUNTER (OUTPATIENT)
Dept: MRI IMAGING | Facility: CLINIC | Age: 69
Discharge: HOME | End: 2024-07-17
Attending: OTOLARYNGOLOGY
Payer: COMMERCIAL

## 2024-07-17 DIAGNOSIS — C31.9 MALIGNANT NEOPLASM OF ACCESSORY SINUS (H): ICD-10-CM

## 2024-07-17 PROCEDURE — A9552 F18 FDG: HCPCS | Performed by: OTOLARYNGOLOGY

## 2024-07-17 PROCEDURE — 70491 CT SOFT TISSUE NECK W/DYE: CPT

## 2024-07-17 PROCEDURE — 70543 MRI ORBT/FAC/NCK W/O &W/DYE: CPT

## 2024-07-17 PROCEDURE — A9585 GADOBUTROL INJECTION: HCPCS | Performed by: OTOLARYNGOLOGY

## 2024-07-17 PROCEDURE — 255N000002 HC RX 255 OP 636: Performed by: OTOLARYNGOLOGY

## 2024-07-17 PROCEDURE — 74177 CT ABD & PELVIS W/CONTRAST: CPT | Mod: 26 | Performed by: RADIOLOGY

## 2024-07-17 PROCEDURE — 250N000011 HC RX IP 250 OP 636: Performed by: OTOLARYNGOLOGY

## 2024-07-17 PROCEDURE — 78816 PET IMAGE W/CT FULL BODY: CPT | Mod: 26 | Performed by: RADIOLOGY

## 2024-07-17 PROCEDURE — 71260 CT THORAX DX C+: CPT

## 2024-07-17 PROCEDURE — 71260 CT THORAX DX C+: CPT | Mod: 26 | Performed by: RADIOLOGY

## 2024-07-17 PROCEDURE — 78816 PET IMAGE W/CT FULL BODY: CPT | Mod: PS

## 2024-07-17 PROCEDURE — 343N000001 HC RX 343: Performed by: OTOLARYNGOLOGY

## 2024-07-17 PROCEDURE — 70543 MRI ORBT/FAC/NCK W/O &W/DYE: CPT | Mod: 26 | Performed by: RADIOLOGY

## 2024-07-17 PROCEDURE — 70491 CT SOFT TISSUE NECK W/DYE: CPT | Mod: 26 | Performed by: RADIOLOGY

## 2024-07-17 RX ORDER — GADOBUTROL 604.72 MG/ML
7 INJECTION INTRAVENOUS ONCE
Status: CANCELLED | OUTPATIENT
Start: 2024-07-17 | End: 2024-07-17

## 2024-07-17 RX ORDER — FLUDEOXYGLUCOSE F 18 200 MCI/ML
10-18 INJECTION, SOLUTION INTRAVENOUS ONCE
Status: COMPLETED | OUTPATIENT
Start: 2024-07-17 | End: 2024-07-17

## 2024-07-17 RX ORDER — IOPAMIDOL 755 MG/ML
10-135 INJECTION, SOLUTION INTRAVASCULAR ONCE
Status: COMPLETED | OUTPATIENT
Start: 2024-07-17 | End: 2024-07-17

## 2024-07-17 RX ORDER — GADOBUTROL 604.72 MG/ML
7.5 INJECTION INTRAVENOUS ONCE
Status: COMPLETED | OUTPATIENT
Start: 2024-07-17 | End: 2024-07-17

## 2024-07-17 RX ADMIN — GADOBUTROL 7 ML: 604.72 INJECTION INTRAVENOUS at 15:48

## 2024-07-17 RX ADMIN — FLUDEOXYGLUCOSE F 18 10.02 MILLICURIE: 200 INJECTION, SOLUTION INTRAVENOUS at 11:55

## 2024-07-17 RX ADMIN — IOPAMIDOL 95 ML: 755 INJECTION, SOLUTION INTRAVENOUS at 13:32

## 2024-07-22 ENCOUNTER — PATIENT OUTREACH (OUTPATIENT)
Dept: SURGERY | Facility: CLINIC | Age: 69
End: 2024-07-22
Payer: COMMERCIAL

## 2024-07-22 NOTE — PROGRESS NOTES
Spoke with patient as he had left a voicemail for writer asking if Dr. Singer can see his images and report from his PET scan. Yes, the PET was done through the [facility] system. Opal had no further questions or concerns and looks forward to talking with Dr. Singer tomorrow at his clinic visit.

## 2024-07-22 NOTE — PROGRESS NOTES
THORACIC SURGERY - FOLLOW UP OFFICE VISIT       Dear ANTHONY Dawkins,      I saw Opal Benitez in follow up for the evaluation and treatment of a nodule of the LEFT upper lobe.     HPI  Mr. Opal Benitez is a 68 year old male patient with history of sinonasal SCC (within a previous inverted papilloma), now with residual or recurrent local sinus mass. On whole body PET, there is also a left apical subpleural nodule with mild uptake. He was referred to thoracic surgery for evaluation. He has lost 5 pounds unintentionally over the last month.  He walks 4-6 miles daily and does strength training and running 5 or 6 days per week.    On 2/6/2024, I recommended resection. A decision was made to repeat scans in 6 months.     Today, 7/23/2024, he is still recovering from radiation therapy. His appetite is slowly recovering. He has lost 20 pounds since starting radiation therapy, which he completed mid-April. He is less active as well.     ECOG performance status  0- Fully active, without restriction                 Previsit Tests   PET scan  (7/17/2024):   LEFT upper lobe 1.1 cm nodule, SUVmax 2.4        CT scan (12/29/23): LEFT upper lobe 1.1cm pulmonary Nodule, without mediastinal adenopathy, with no pleural effusion        PET scan (12/29/23): FDG avid 1.1cm LEFT upper lobe Nodule (Max SUV 2).      Covid vaccination status: Vaccinated     PMH  Reviewed, as below     Past Medical History:   Diagnosis Date    Hearing aid worn     Unspecified essential hypertension     Essential hypertension            PSH  Reviewed, as below     Past Surgical History:   Procedure Laterality Date    COLOGUARD(EXACT SCIENCES)  12/10/2018    COLOGUARD(EXACT SCIENCES)  11/18/2015    CYSTOSCOPY  2000    OPTICAL TRACKING SYSTEM ENDOSCOPIC SINUS SURGERY N/A 1/26/2024    Procedure: Stealth guided transnasal approach to resect sinonasal tumor with Rivero-Chito approach.;  Surgeon: Karina Kumar MD;  Location:  OR           No Known  Allergies       Current Outpatient Medications   Medication Sig Dispense Refill    dapagliflozin (FARXIGA) 10 MG TABS tablet Take 1 tablet (10 mg) by mouth daily 90 tablet 3    ibuprofen (ADVIL/MOTRIN) 800 MG tablet Take 900 mg by mouth every 8 hours as needed for moderate pain      lisinopril-hydrochlorothiazide (ZESTORETIC) 20-12.5 MG tablet Take 1 tablet by mouth daily 90 tablet 3    metFORMIN (GLUCOPHAGE XR) 500 MG 24 hr tablet Take 4 tablets (2,000 mg) by mouth daily 360 tablet 3    pravastatin (PRAVACHOL) 40 MG tablet Take 1 tablet (40 mg) by mouth daily 90 tablet 3    sodium chloride (OCEAN) 0.65 % nasal spray Spray two sprays into both sides of your nose every 3 hours while you are awake 88 mL 3     No current facility-administered medications for this visit.            ETOH: 5 days per week, 1 can of beer  TOBACCO: Never  OTHER DRUGS: Denies     Physical examination  /84 (BP Location: Right arm, Patient Position: Sitting, Cuff Size: Adult Regular)   Pulse 93   Temp 97.6  F (36.4  C) (Oral)   Resp 18   Wt 71.7 kg (158 lb 1.6 oz)   SpO2 99%   BMI 25.14 kg/m     Physical Exam  Constitutional:       Appearance: Normal appearance. He is normal weight.   Eyes:      Conjunctiva/sclera: Conjunctivae normal.   Cardiovascular:      Rate and Rhythm: Normal rate.   Pulmonary:      Effort: Pulmonary effort is normal.   Skin:     General: Skin is warm and dry.   Neurological:      Mental Status: He is alert and oriented to person, place, and time.   Psychiatric:         Mood and Affect: Mood normal.         Behavior: Behavior normal.         Thought Content: Thought content normal.         Judgment: Judgment normal.            From a personal perspective, he is a  ([employer] at [employer]. He lives with his wife. They have two children.      IMPRESSION   68 year old male patient with a stable LEFT upper lobe indeterminate pulmonary nodule. He is a good surgical  candidate.    PLAN  I spent 15 min on the date of the encounter in chart review, patient visit, review of tests, documentation and/or discussion with other providers about the issues documented above. I reviewed the plan as follows:     CT chest in 6 months, depending on oncology plan for repeat imaging. If the nodule remains stable, will follow for total of two years.     I appreciate the opportunity to participate in the care of your patient and will keep you updated.     Sincerely,       Bernardino Singer MD

## 2024-07-23 ENCOUNTER — ONCOLOGY VISIT (OUTPATIENT)
Dept: SURGERY | Facility: CLINIC | Age: 69
End: 2024-07-23
Attending: THORACIC SURGERY (CARDIOTHORACIC VASCULAR SURGERY)
Payer: COMMERCIAL

## 2024-07-23 VITALS
BODY MASS INDEX: 25.14 KG/M2 | HEART RATE: 93 BPM | RESPIRATION RATE: 18 BRPM | SYSTOLIC BLOOD PRESSURE: 136 MMHG | OXYGEN SATURATION: 99 % | WEIGHT: 158.1 LBS | DIASTOLIC BLOOD PRESSURE: 84 MMHG | TEMPERATURE: 97.6 F

## 2024-07-23 DIAGNOSIS — R91.1 NODULE OF APEX OF LEFT LUNG: Primary | ICD-10-CM

## 2024-07-23 PROCEDURE — 99213 OFFICE O/P EST LOW 20 MIN: CPT | Performed by: THORACIC SURGERY (CARDIOTHORACIC VASCULAR SURGERY)

## 2024-07-23 PROCEDURE — 99212 OFFICE O/P EST SF 10 MIN: CPT | Performed by: THORACIC SURGERY (CARDIOTHORACIC VASCULAR SURGERY)

## 2024-07-23 NOTE — NURSING NOTE
"Oncology Rooming Note    July 23, 2024 2:00 PM   Opal Benitez is a 68 year old male who presents for:    Chief Complaint   Patient presents with    Oncology Clinic Visit     Malignant neoplasm of paranasal sinus       Initial Vitals: /84 (BP Location: Right arm, Patient Position: Sitting, Cuff Size: Adult Regular)   Pulse 93   Temp 97.6  F (36.4  C) (Oral)   Resp 18   Wt 71.7 kg (158 lb 1.6 oz)   SpO2 99%   BMI 25.14 kg/m   Estimated body mass index is 25.14 kg/m  as calculated from the following:    Height as of 6/17/24: 1.689 m (5' 6.5\").    Weight as of this encounter: 71.7 kg (158 lb 1.6 oz). Body surface area is 1.83 meters squared.  Data Unavailable Comment: states pain comes and goes, mostly pressure   No LMP for male patient.  Allergies reviewed: Yes  Medications reviewed: Yes    Medications: Medication refills not needed today.  Pharmacy name entered into EPIC:    [facility]  - [place], [place] - [address] [id number] [place], [place] - [address] [facility]    Frailty Screening:   Is the patient here for a new oncology consult visit in cancer care? 2. No      Clinical concerns: Patient states no new concerns to discuss with provider.  Dr. Singer was NOT notified.      Deuce Smith EMT            "

## 2024-07-23 NOTE — PROGRESS NOTES
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   Completed radiation therapy on : April 19, 2024        Path Result 01/26/2024  Final Diagnosis   A.SINUS CONTENTS, ETHMOID AND RIGHT INFERIOR TURBINATE:  - Benign respiratory mucosa and turbinate with non-specific chronic inflammation and fibrosis  - No evidence of high grade dysplasia/carcinoma     B. RIGHT SPHENOID SINUS, BIOPSY:  - Negative for high grade dysplasia/carcinoma     C. RIGHT ETHMOID, BIOPSY:  - Negative for high grade dysplasia/carcinoma     D. SINUS CONTENTS, RIGHT POSTERIOR ETHMOID:  - Fragments of inverted squamous papilloma  - Negative for high grade dysplasia/carcinoma     E. SINUS CONTENTS, RIGHT ANTERIOR ETHMOID:  - Fragments of inverted squamous papilloma  - No evidence of high grade dysplasia/carcinoma     F. RIGHT INFRA ORBITAL NERVE, BIOPSY:  - Nerve and bone tissue, negative for carcinoma     G. MAXILLARY SINUS, RIGHT, SIMPLE EXCISION:  - SQUAMOUS CELL CARCINOMA, FOCALLY INVASIVE in a background of granulation tissue and fibrosis  - No perineural or lymphovascular invasion is seen  - Peripheral and deep resection margins are negative  - AJCC Pathologic Stage: pT1     H. ORBITAL FLOOR BONE, BIOPSY:  - Negative for carcinoma     I. SINUS CONTENT, RIGHT ETHMOID AND TURBINATE:  - Fragment of inverted squamous papilloma  - Negative for high grade dysplasia/carcinoma     J. RIGHT MAXILLARY SINUS, POSTERIOR BONY WALL:  - Negative for carcinoma         History of Present Illness: Patient is here today for tumor surveillance follow-up.  He is now 3 months out from the radiation therapy.  He is doing well.  He denies any sinonasal symptoms at this time.  He is eating well, no dysphagia.  He is not losing any weight.    MEDICATIONS:     Current Outpatient Medications   Medication Sig Dispense Refill    dapagliflozin  (FARXIGA) 10 MG TABS tablet Take 1 tablet (10 mg) by mouth daily 90 tablet 3    ibuprofen (ADVIL/MOTRIN) 800 MG tablet Take 900 mg by mouth every 8 hours as needed for moderate pain (Patient not taking: Reported on 7/23/2024)      lisinopril-hydrochlorothiazide (ZESTORETIC) 20-12.5 MG tablet Take 1 tablet by mouth daily 90 tablet 3    metFORMIN (GLUCOPHAGE XR) 500 MG 24 hr tablet Take 4 tablets (2,000 mg) by mouth daily 360 tablet 3    pravastatin (PRAVACHOL) 40 MG tablet Take 1 tablet (40 mg) by mouth daily 90 tablet 3    sodium chloride (OCEAN) 0.65 % nasal spray Spray two sprays into both sides of your nose every 3 hours while you are awake (Patient taking differently: as needed for congestion (needs it less often now than months ago) Spray two sprays into both sides of your nose every 3 hours while you are awake) 88 mL 3       ALLERGIES:  No Known Allergies      PAST MEDICAL HISTORY:   Past Medical History:   Diagnosis Date    Hearing aid worn     Unspecified essential hypertension     Essential hypertension        FAMILY HISTORY:    Family History   Problem Relation Age of Onset    Hypertension Father     Lung Cancer Father     Diabetes Type 2  Father     Diabetes No family hx of     Eye Disorder No family hx of        REVIEW OF SYSTEMS:  12 point ROS was negative other than the symptoms noted above in the HPI.    PHYSICAL EXAMINATION:      Constitutional:  The patient was unaccompanied, well-groomed, and in no acute distress.     Skin: Normal:  warm and pink without rash   Neurologic: Alert and oriented x 3.  CN's III-XII within normal limits.  Voice normal.    Psychiatric: The patient's affect was calm, cooperative, and appropriate.     Communication:  Normal; communicates verbally, normal voice quality.   Respiratory: Breathing comfortably without stridor or exertion of accessory muscles.    Head/Face:  Normocephalic and atraumatic.  No lesions or scars. No sinus tenderness.    Salivary glands -  Normal  size, no tenderness, swelling, or palpable masses   Eyes: Pupils were equal and reactive.  Extraocular movement intact.         Nose: Sinuses were non-tender.  Anterior rhinoscopy revealed midline septum and absence of purulence or polyps.     Oral Cavity: Normal tongue, floor of mouth, buccal mucosa, and palate.  No lesions or masses on inspection or palpation.     Oropharynx: Normal mucosa, palate symmetric with normal elevation. No abnormal lymph tissue in the oropharynx.             Neck: Supple with normal laryngeal and tracheal landmarks.  The parotid beds were without masses.  No palpable thyroid.  Normal range of motion   Lymphatic: There is no palpable lymphadenopathy in the neck.        Nasal endoscopy: Consent for nasal endoscopy was obtained.  I confirmed correctness of the procedure and identity of the patient.  Nasal endoscopy was indicated due to sinonasal malignancy.  The nose was topically decongested and anesthetized.  I used the flexible endoscope in order to gain access into the maxillary sinus.  The scope was passed under endoscopic visualization.  On the right side there is evidence of prior sinonasal surgery there is a large maxillary and antrostomy.  There is a large sphenoidotomy.  I do not see any masses or lesions concerning for malignancy.  I was able to gain access into the right maxillary sinus.  I do not see any concerning lesions within the maxillary sinus.     MRI Sinus 7/17/2024:  Impression: Status post surgical and postradiation changes in the  right side of the sinonasal region. Heterogeneous enhancing signal  changes along the right maxillary sinus and extending through the bony  defect into the right retromaxillary fat pad and the pterygopalatine  fossa. When evaluated with the FDG PET appearances from same day,  findings are felt to be most likely postsurgical. Attention on  follow-up is recommended.    PET CT 7/17/2024:  1. Postoperative and postradiation changes in the  sinonasal region and   mucosal spaces with no evidence of recurrent tumor.   2. No new cervical lymphadenopathy.   3. No evidence of new distant metastasis.   4. Stable mildly FDG avid left apical lung nodule dating back to   12/29/2023 ...     IMPRESSION AND PLAN: Patient is 3 months out from completing his treatment for right sided sinonasal squamous cell carcinoma.  Patient is doing well.  We are going to review his images in tumor board tomorrow.  Otherwise I will see him back in 2 months for another clinical follow-up.  Most likely we will obtain another set of scans and MRI in 3 months.        Karina Kumar MD, M.S.  Otolaryngology- Head & Neck Surgery  [phone]

## 2024-07-23 NOTE — LETTER
7/23/2024      Opal Benitez  [address] [address]      Dear Colleague,    Thank you for referring your patient, Opal Benitez, to the [facility]. Please see a copy of my visit note below.    THORACIC SURGERY - FOLLOW UP OFFICE VISIT       Dear ANTHONY Dawkins,      I saw Opal Benitez in follow up for the evaluation and treatment of a nodule of the LEFT upper lobe.     HPI  Mr. Opal Benitez is a 68 year old male patient with history of sinonasal SCC (within a previous inverted papilloma), now with residual or recurrent local sinus mass. On whole body PET, there is also a left apical subpleural nodule with mild uptake. He was referred to thoracic surgery for evaluation. He has lost 5 pounds unintentionally over the last month.  He walks 4-6 miles daily and does strength training and running 5 or 6 days per week.    On 2/6/2024, I recommended resection. A decision was made to repeat scans in 6 months.     Today, 7/23/2024, he is still recovering from radiation therapy. His appetite is slowly recovering. He has lost 20 pounds since starting radiation therapy, which he completed mid-April. He is less active as well.     ECOG performance status  0- Fully active, without restriction                 Previsit Tests   PET scan  (7/17/2024):   LEFT upper lobe 1.1 cm nodule, SUVmax 2.4        CT scan (12/29/23): LEFT upper lobe 1.1cm pulmonary Nodule, without mediastinal adenopathy, with no pleural effusion        PET scan (12/29/23): FDG avid 1.1cm LEFT upper lobe Nodule (Max SUV 2).      Covid vaccination status: Vaccinated     PMH  Reviewed, as below     Past Medical History:   Diagnosis Date     Hearing aid worn      Unspecified essential hypertension     Essential hypertension            PSH  Reviewed, as below     Past Surgical History:   Procedure Laterality Date     COLOGUARD(EXACT SCIENCES)  12/10/2018     COLOGUARD(EXACT SCIENCES)  11/18/2015     CYSTOSCOPY  2000      OPTICAL TRACKING SYSTEM ENDOSCOPIC SINUS SURGERY N/A 1/26/2024    Procedure: Stealth guided transnasal approach to resect sinonasal tumor with Rivero-Chito approach.;  Surgeon: Karina Kumar MD;  Location:  OR           No Known Allergies       Current Outpatient Medications   Medication Sig Dispense Refill     dapagliflozin (FARXIGA) 10 MG TABS tablet Take 1 tablet (10 mg) by mouth daily 90 tablet 3     ibuprofen (ADVIL/MOTRIN) 800 MG tablet Take 900 mg by mouth every 8 hours as needed for moderate pain       lisinopril-hydrochlorothiazide (ZESTORETIC) 20-12.5 MG tablet Take 1 tablet by mouth daily 90 tablet 3     metFORMIN (GLUCOPHAGE XR) 500 MG 24 hr tablet Take 4 tablets (2,000 mg) by mouth daily 360 tablet 3     pravastatin (PRAVACHOL) 40 MG tablet Take 1 tablet (40 mg) by mouth daily 90 tablet 3     sodium chloride (OCEAN) 0.65 % nasal spray Spray two sprays into both sides of your nose every 3 hours while you are awake 88 mL 3     No current facility-administered medications for this visit.            ETOH: 5 days per week, 1 can of beer  TOBACCO: Never  OTHER DRUGS: Denies     Physical examination  /84 (BP Location: Right arm, Patient Position: Sitting, Cuff Size: Adult Regular)   Pulse 93   Temp 97.6  F (36.4  C) (Oral)   Resp 18   Wt 71.7 kg (158 lb 1.6 oz)   SpO2 99%   BMI 25.14 kg/m     Physical Exam  Constitutional:       Appearance: Normal appearance. He is normal weight.   Eyes:      Conjunctiva/sclera: Conjunctivae normal.   Cardiovascular:      Rate and Rhythm: Normal rate.   Pulmonary:      Effort: Pulmonary effort is normal.   Skin:     General: Skin is warm and dry.   Neurological:      Mental Status: He is alert and oriented to person, place, and time.   Psychiatric:         Mood and Affect: Mood normal.         Behavior: Behavior normal.         Thought Content: Thought content normal.         Judgment: Judgment normal.            From a personal perspective, he  is a  ([employer] at [employer]. He lives with his wife. They have two children.      IMPRESSION   68 year old male patient with a stable LEFT upper lobe indeterminate pulmonary nodule. He is a good surgical candidate.    PLAN  I spent 15 min on the date of the encounter in chart review, patient visit, review of tests, documentation and/or discussion with other providers about the issues documented above. I reviewed the plan as follows:     CT chest in 6 months, depending on oncology plan for repeat imaging. If the nodule remains stable, will follow for total of two years.     I appreciate the opportunity to participate in the care of your patient and will keep you updated.     Sincerely,       Bernardino Singer MD       Again, thank you for allowing me to participate in the care of your patient.        Sincerely,        Bernardino Singer MD

## 2024-07-24 ENCOUNTER — OFFICE VISIT (OUTPATIENT)
Dept: RADIATION ONCOLOGY | Facility: CLINIC | Age: 69
End: 2024-07-24
Attending: RADIOLOGY
Payer: COMMERCIAL

## 2024-07-24 VITALS
HEART RATE: 64 BPM | DIASTOLIC BLOOD PRESSURE: 74 MMHG | SYSTOLIC BLOOD PRESSURE: 132 MMHG | RESPIRATION RATE: 16 BRPM | WEIGHT: 159 LBS | BODY MASS INDEX: 25.28 KG/M2

## 2024-07-24 DIAGNOSIS — C31.9 MALIGNANT NEOPLASM OF PARANASAL SINUS (H): Primary | ICD-10-CM

## 2024-07-24 PROCEDURE — 99213 OFFICE O/P EST LOW 20 MIN: CPT | Performed by: RADIOLOGY

## 2024-07-24 NOTE — Clinical Note
"2024      Opal Benitez  [address] [address]      Dear Colleague,    Thank you for referring your patient, Opal Benitez, to the [facility] RADIATION [facility]COL[facility]. Please see a copy of my visit note below.    Oncology Rooming Note    2024 1:53 PM   Opal Benitez is a 68 year old male who presents for:    Chief Complaint   Patient presents with    Oncology Clinic Visit     Follow up:Paranasal Sinus:H&N 6000 cGy completed 24     Initial Vitals: There were no vitals taken for this visit. Estimated body mass index is 25.14 kg/m  as calculated from the following:    Height as of 24: 1.689 m (5' 6.5\").    Weight as of 24: 71.7 kg (158 lb 1.6 oz). There is no height or weight on file to calculate BSA.    No LMP for male patient.  Allergies reviewed: Yes  Medications reviewed: Yes    Medications: Medication refills not needed today.  Pharmacy name entered into EPIC:    [facility]  - [place], [place] - [address] [place] [address] [address] [id number] [facility] [address] [facility]    Frailty Screening:   Is the patient here for a new oncology consult visit in cancer care? 2. No      Clinical concerns: No concerns  Dr Galeas was notified.      Eun Cano RN    FOLLOW-UP VISIT    Patient Name: Opal Benitez      : 1955     Age: 68 year old        ______________________________________________________________________________     Chief Complaint   Patient presents with    Oncology Clinic Visit     Follow up:Paranasal Sinus:H&N 6000 cGy completed 24     /74   Pulse 64   Resp 16   Wt 72.1 kg (159 lb)   BMI 25.28 kg/m       Pain  Denies    Labs  Other Labs: No    Imaging  MRI: 24      Dental:   Most Recent Dental Visit: Yes    Speech/Swallowing:   Most Recent evaluation or testing: No  Swallowing Restrictions: No difficulties with swallowing    Trismus/Jaw Exercises: Yes    Nutrition:    Weight:   Wt Readings from Last 3 Encounters:   24 " 72.1 kg (159 lb)   07/23/24 71.7 kg (158 lb 1.6 oz)   06/17/24 69.9 kg (154 lb)         Oral Symptoms:   Xerostomia:0- None  Dysphagia: 0-None  Mucositis Oral Symptoms: 0-None  Mucositis: 0- None  Esophagitis:0- None    Other Appointments:     MD Name:  Appointment Date:    MD Name: Appointment Date:   MD Name: Appointment Date:   Other Appointment Notes:     Residual Radiation side effect: No concerns                     Again, thank you for allowing me to participate in the care of your patient.        Sincerely,        Johana Galeas MD

## 2024-07-24 NOTE — PROGRESS NOTES
"Oncology Rooming Note    2024 1:53 PM   Opal Benitez is a 68 year old male who presents for:    Chief Complaint   Patient presents with    Oncology Clinic Visit     Follow up:Paranasal Sinus:H&N 6000 cGy completed 24     Initial Vitals: There were no vitals taken for this visit. Estimated body mass index is 25.14 kg/m  as calculated from the following:    Height as of 24: 1.689 m (5' 6.5\").    Weight as of 24: 71.7 kg (158 lb 1.6 oz). There is no height or weight on file to calculate BSA.    No LMP for male patient.  Allergies reviewed: Yes  Medications reviewed: Yes    Medications: Medication refills not needed today.  Pharmacy name entered into EPIC:    [facility]  - [place], [place] - [address] [facility] [address] [place] [address]    Frailty Screening:   Is the patient here for a new oncology consult visit in cancer care? 2. No      Clinical concerns: No concerns  Dr Galeas was notified.      Eun Cano RN    FOLLOW-UP VISIT    Patient Name: Opal Benitez      : 1955     Age: 68 year old        ______________________________________________________________________________     Chief Complaint   Patient presents with    Oncology Clinic Visit     Follow up:Paranasal Sinus:H&N 6000 cGy completed 24     /74   Pulse 64   Resp 16   Wt 72.1 kg (159 lb)   BMI 25.28 kg/m       Pain  Denies    Labs  Other Labs: No    Imaging  MRI: 24      Dental:   Most Recent Dental Visit: Yes    Speech/Swallowing:   Most Recent evaluation or testing: No  Swallowing Restrictions: No difficulties with swallowing    Trismus/Jaw Exercises: Yes    Nutrition:    Weight:   Wt Readings from Last 3 Encounters:   24 72.1 kg (159 lb)   24 71.7 kg (158 lb 1.6 oz)   24 69.9 kg (154 lb)         Oral Symptoms:   Xerostomia:0- None  Dysphagia: 0-None  Mucositis Oral Symptoms: 0-None  Mucositis: 0- None  Esophagitis:0- None    Other Appointments: "     MD Name:  Appointment Date:    MD Name: Appointment Date:   MD Name: Appointment Date:   Other Appointment Notes:     Residual Radiation side effect: No concerns

## 2024-07-25 ENCOUNTER — OFFICE VISIT (OUTPATIENT)
Dept: OTOLARYNGOLOGY | Facility: CLINIC | Age: 69
End: 2024-07-25
Payer: COMMERCIAL

## 2024-07-25 VITALS
HEART RATE: 98 BPM | HEIGHT: 67 IN | WEIGHT: 157.4 LBS | DIASTOLIC BLOOD PRESSURE: 80 MMHG | SYSTOLIC BLOOD PRESSURE: 123 MMHG | BODY MASS INDEX: 24.71 KG/M2 | OXYGEN SATURATION: 98 %

## 2024-07-25 DIAGNOSIS — C31.9 MALIGNANT NEOPLASM OF ACCESSORY SINUS (H): Primary | ICD-10-CM

## 2024-07-25 PROCEDURE — 99214 OFFICE O/P EST MOD 30 MIN: CPT | Mod: 25 | Performed by: OTOLARYNGOLOGY

## 2024-07-25 PROCEDURE — 31231 NASAL ENDOSCOPY DX: CPT | Performed by: OTOLARYNGOLOGY

## 2024-07-25 ASSESSMENT — PAIN SCALES - GENERAL: PAINLEVEL: SEVERE PAIN (6)

## 2024-07-25 NOTE — PATIENT INSTRUCTIONS
You were seen in the ENT Clinic today by Dr. Kumar. If you have any questions or concerns after your appointment, please contact us (see below)    The following has been recommended for you based upon your appointment today:  We will call you tomorrow after our tumor board conference     Please return to clinic in 2 months for follow up    How to Contact Us:  Send a Waizy message to your provider. Our team will respond to you via Waizy. Occasionally, we will need to call you to get further information.  For urgent matters (Monday-Friday), call the ENT Clinic: [phone] and speak with a call center team member - they will route your call appropriately.   If you'd like to speak directly with a nurse, please find our contact information below. We do our best to check voicemail frequently throughout the day, and will work to call you back within 1-2 days. For urgent matters, please use the general clinic phone numbers listed above.    Laura BAXTER RN, BSN  RN Care Coordinator, ENT Clinic  [facility]  Direct: [phone]

## 2024-07-25 NOTE — TUMOR CONFERENCE
Head & Neck Tumor Conference Note   Status: Established   Staff: Dr. 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  Patient had right  transnasal endoscopic resection on October 26, 2023. He had a sinus MRI and PET/CT to stage which was presented at tumor board with concern for recurrent SCC at the anterior ethmoids, roof of the maxillary sinus and floor of the orbit. He subsequently underwent stealth guided transnasal approach to resect of sinonasal tumor with Rivero-Chito approach on 01/26/2024. He then completed adjuvant radiation therapy on 4/19/2024 (sinus and nasal cavity received 60Gy, right neck received 54Gy). He was recently seen in clinic for follow up along with 3 month post-treatment PET/CT and sinus MRI. We are here today to review imaging and discuss plan of care.       Pertinent PMH:   Past Medical History:   Diagnosis Date    Hearing aid worn     Unspecified essential hypertension     Essential hypertension      Smoking Hx:   Social History     Tobacco Use    Smoking status: Never     Passive exposure: Never    Smokeless tobacco: Never    Tobacco comments:     states he never has smokes   Vaping Use    Vaping status: Never Used   Substance Use Topics    Alcohol use: Yes     Alcohol/week: 6.0 standard drinks of alcohol     Types: 6 Cans of beer per week     Comment: occasional beer    Drug use: Never       Imaging:     PET Oncology Whole Body    Narrative  Combined Report of: PET and CT on 7/17/2024  1:33 PM:    1. PET of the neck, chest, abdomen, and pelvis.  2. PET CT Fusion for Attenuation Correction and Anatomical  Localization.  3. Diagnostic CT of the chest, abdomen and pelvis with intravenous  contrast obtained for diagnostic interpretation.  4. 3D MIP and PET-CT fused images were processed on an independent  workstation and archived to PACS and reviewed by a radiologist.    Technique:    1. PET: The patient received 10.02 mCi of F-18-FDG. The serum glucose  was 123 mg/dL prior to administration. Body weight was 66.9 kg. Images  were evaluated in the axial, sagittal, and coronal planes as well as  the rotational whole body MIP. Images were acquired from cranial  vertex to feet.    UPTAKE WAS MEASURED AT 60 MINUTES.    2. CT: Volumetric acquisition for clinical interpretation of the  chest, abdomen, and pelvis acquired at 3 mm sections. The chest,  abdomen, and pelvis were evaluated at 5 mm sections in bone, soft  tissue, and lung windows. High resolution images of the neck were  obtained with multiple oblique projection reformats.    Contrast and Medications:  IV contrast: 95 mL of Isovue 370 intravenously.  PO contrast: None.  Additional Medications: On Metformin.    3. 3D MIP and PET-CT fused images were processed on an independent  workstation and archived to PACS and reviewed by a radiologist.    INDICATION: sinonasal SCC; Malignant neoplasm of maxillary sinus (H).    ADDITIONAL INFORMATION OBTAINED FROM EMR: Right sinonasal SCC arising  from inverted papilloma, T3 N0 M0. Surgery 01/26/2024?Stealth guided  transnasal approach to resect sinonasal tumor with Rivero-Chito  approach?.  Completed radiation therapy on: April 19, 2024.?    COMPARISON: .MRI on 7/17/2024, Preoperative MRI on 1/9/2024.    FINDINGS:    BACKGROUND: Liver SUV max = 2.92, Aorta Blood SUV max = 2.43.    HEAD/NECK:    Sinonasal region:  -There are postoperative changes including right ethmoidectomy, right  turbinectomy and right  medial antrectomy. There are surgical bony  defects of the posterior-lateral wall of the right maxillary sinus and  anterior inferior wall of the right maxillary sinus.  There is thick mucosal thickening along the anterior, lateral and  posterior walls of the right maxillary sinus with mild FDG metabolism  along the superficial aspect of the contents. The max SUV measures 4.1  and this is likely inflammatory. The soft tissue attenuation along the  right maxillary posterior wall defect extending into the  retromaxillary fat pad and pterygopalatine fossa does not demonstrate  suspicious uptake for that reason suggestive of inflammatory/  postsurgical changes. Otherwise no other focal suspicious FDG uptake  in this area or elsewhere in the sinonasal region to suggest residual  or recurrent tumor.    Pharyngeal mucosal spaces: There is asymmetric slight FDG metabolism  by the left tonsil with lack of uptake in the right tonsil is likely  related to radiation effect on the right, and physiologic uptake on  the left. Otherwise no suspicious FDG uptake or masslike appearance  along the nasopharyngeal, oropharyngeal, hypopharyngeal or laryngeal  mucosal spaces.    Lymph nodes: No FDG avid or abnormally enlarged lymph nodes. Right  level 2 a 1 cm node is stable, not avid, a normal node.    Salivary glands: The major salivary glands are within normal limits.    Thyroid gland: The thyroid gland is within normal limits.    Vascular structures: The major vasculature of the neck are patent.    Brain: No abnormal FDG avid lesion or abnormal enhancement.    Orbital cavities: No abnormal FDG avid lesion or abnormal enhancement.      CHEST:    Lymph nodes: No FDG avid or abnormally enlarged lymph nodes.    Lungs: The central tracheobronchial tree is clear. Left apical 1.1 cm  subpleural nodule with minimal metabolic activity (maximum SUV 2.4,  previously 1.9) is stable over several prior studies. A mildly FDG  avid (maximum SUV 2.7)  left hilar lymph node is noted measuring 1.2 x  0.6 cm stable in size, less avid prior studies No pleural effusion or  pneumothorax.    Heart and great vessels: Heart size is within normal limits. No  pericardial effusion. The thoracic aorta and main pulmonary artery are  within normal limits. The esophagus is unremarkable.    ABDOMEN AND PELVIS:    Liver: No FDG avid lesion. No intrahepatic or extrahepatic biliary  ductal dilatation. Gallbladder is within normal limits.    Pancreas: The pancreas is within normal limits. No pancreatic ductal  dilatation.    Spleen: No FDG avid lesion.    Adrenal glands: No FDG avid foci.    Kidneys: No FDG avid lesion. No hydronephrosis. The urinary bladder is  unremarkable.    Gastrointestinal system: Normal caliber of the small and large bowel.  Extensive FDG metabolism diffusely throughout the colon. This can be  seen with metformin use.    Lymph nodes: No FDG avid or abnormally enlarged lymph nodes.    Vascular structures: Normal caliber of the abdominal aorta.    No free air, free fluid, or fluid collection.    A central hypervascular nodule is noted in the prostate, a nonspecific  finding, is noted. Prostatomegaly is present    EXTREMITIES:    No abnormal FDG uptake in the visualized extremities.    BONES AND SOFT TISSUES:    No abnormal FDG uptake in the skeleton. No abnormal lytic or blastic  osseous lesions.  Degenerative changes of the lower lumbar spine.    SPINAL CANAL:    No evident canal compromise. No abnormal FDG avid lesion.    Impression  IMPRESSION:  In this patient status post surgical and radiation treatment of right  maxillary sinus SCC:    1. Postoperative and postradiation changes in the sinonasal region and  mucosal spaces with no evidence of recurrent tumor.  2. No new cervical lymphadenopathy.  3. No evidence of new distant metastasis.  4. Stable mildly FDG avid left apical lung nodule dating back to  12/29/2023      MR SINONASAL/ORAL CAVITY/PAROTID WWO  CONTRAST 7/17/2024 4:20 PM     History:  sinonasal SCC; Malignant neoplasm of accessory sinus (H)  ICD-10: Malignant neoplasm of accessory sinus (H)     Comparison:  Face MRI on 1/9/2024      Correlation same day PET/CT.     Technique: Sagittal and coronal T1-weighted and axial T2-weighted  images with fat saturation of the face and nasopharynx from the roofs  of the orbits through the base of C2 were obtained without intravenous  contrast. Following intravenous administration of gadolinium, axial  and coronal T1-weighted images with fat saturation of the face were  also obtained.     Contrast: 7mL GADAVIST     Findings:      There are prior postsurgical changes right medial antrectomy,  ethmoidectomy, turbinectomy. There is inflammatory mucosal thickening  along the ethmoid air cells, right maxillary sinus wall. There are T1  hypointense, enhancing and T2 heterogeneous signal changes extending  through the bony defect of the right maxillary sinus posterior wall  into the retromaxillary fat pad and pterygopalatine fossa. These are  indeterminate on MRI however on the same day PET CT do not demonstrate  increased metabolism.     The frontal sinus, left ethmoid air cells, left maxillary sinus and  sphenoid sinus left locule show no abnormality. There is mucosal  thickening along the right sphenoid locule.   The visualized brain parenchyma no abnormal enhancing lesions. There  is recent development of contrast enhancing. T2 isointense soft tissue  nodule on the lateral wall of the right maxillary sinus, measuring 17  x 16 x 14 mm. This site is concerning for recurrence.     Nasopharyngeal, oropharyngeal, tonsillar, superior epiglottic  appearances are within normal limits. Parotid and submandibular glands  are within normal limits. In the visualized parts of the neck no  suspicious lymph nodes. No enhancing lesions within the brain  parenchyma. Vascular signal voids are preserved. No evidence of  restricted diffusion  in the visualized part of the brain. Mandibular  signal is normal.                                                      Impression: Status post surgical and postradiation changes in the  right side of the sinonasal region. Heterogeneous enhancing signal  changes along the right maxillary sinus and extending through the bony  defect into the right retromaxillary fat pad and the pterygopalatine  fossa. When evaluated with the FDG PET appearances from same day,  findings are felt to be most likely postsurgical. Attention on  follow-up is recommended.      Pathology:   No new pathology      Tumor Board Recommendation:   Discussion: PET/CT and MRI reviewed today. Circumferential mucosa thickening of sinus likely inflammatory. PET consistent with inflammatory changes. Posterior maxillary sinus wall mildly concerning but not avid. On MR, there are suspicious lesions posterior to sinus but not avid on PET. Recent in-office scope without suspicious findings    Plan:   - Repeat PET/CT    Raven Mckenzie MD  Otolaryngology- Head and Neck Surgery, PGY-3    Documentation / Disclaimer Cancer Tumor Board Note: Cancer tumor board recommendations do not override what is determined to be reasonable care and treatment, which is dependent on the circumstances of a patient's case; the patient's medical, social, and personal concerns; and the clinical judgment of the oncologist [physician].

## 2024-07-25 NOTE — LETTER
7/25/2024       RE: Opal Benitez  [address] [address]     Dear Colleague,    Thank you for referring your patient, Opal Benitez, to the [facility] [place] [facility]. Please see a copy of my visit note below.    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   Completed radiation therapy on : April 19, 2024        Path Result 01/26/2024  Final Diagnosis   A.SINUS CONTENTS, ETHMOID AND RIGHT INFERIOR TURBINATE:  - Benign respiratory mucosa and turbinate with non-specific chronic inflammation and fibrosis  - No evidence of high grade dysplasia/carcinoma     B. RIGHT SPHENOID SINUS, BIOPSY:  - Negative for high grade dysplasia/carcinoma     C. RIGHT ETHMOID, BIOPSY:  - Negative for high grade dysplasia/carcinoma     D. SINUS CONTENTS, RIGHT POSTERIOR ETHMOID:  - Fragments of inverted squamous papilloma  - Negative for high grade dysplasia/carcinoma     E. SINUS CONTENTS, RIGHT ANTERIOR ETHMOID:  - Fragments of inverted squamous papilloma  - No evidence of high grade dysplasia/carcinoma     F. RIGHT INFRA ORBITAL NERVE, BIOPSY:  - Nerve and bone tissue, negative for carcinoma     G. MAXILLARY SINUS, RIGHT, SIMPLE EXCISION:  - SQUAMOUS CELL CARCINOMA, FOCALLY INVASIVE in a background of granulation tissue and fibrosis  - No perineural or lymphovascular invasion is seen  - Peripheral and deep resection margins are negative  - AJCC Pathologic Stage: pT1     H. ORBITAL FLOOR BONE, BIOPSY:  - Negative for carcinoma     I. SINUS CONTENT, RIGHT ETHMOID AND TURBINATE:  - Fragment of inverted squamous papilloma  - Negative for high grade dysplasia/carcinoma     J. RIGHT MAXILLARY SINUS, POSTERIOR BONY WALL:  - Negative for carcinoma         History of Present Illness: Patient is here today for  tumor surveillance follow-up.  He is now 3 months out from the radiation therapy.  He is doing well.  He denies any sinonasal symptoms at this time.  He is eating well, no dysphagia.  He is not losing any weight.    MEDICATIONS:     Current Outpatient Medications   Medication Sig Dispense Refill     dapagliflozin (FARXIGA) 10 MG TABS tablet Take 1 tablet (10 mg) by mouth daily 90 tablet 3     ibuprofen (ADVIL/MOTRIN) 800 MG tablet Take 900 mg by mouth every 8 hours as needed for moderate pain (Patient not taking: Reported on 7/23/2024)       lisinopril-hydrochlorothiazide (ZESTORETIC) 20-12.5 MG tablet Take 1 tablet by mouth daily 90 tablet 3     metFORMIN (GLUCOPHAGE XR) 500 MG 24 hr tablet Take 4 tablets (2,000 mg) by mouth daily 360 tablet 3     pravastatin (PRAVACHOL) 40 MG tablet Take 1 tablet (40 mg) by mouth daily 90 tablet 3     sodium chloride (OCEAN) 0.65 % nasal spray Spray two sprays into both sides of your nose every 3 hours while you are awake (Patient taking differently: as needed for congestion (needs it less often now than months ago) Spray two sprays into both sides of your nose every 3 hours while you are awake) 88 mL 3       ALLERGIES:  No Known Allergies      PAST MEDICAL HISTORY:   Past Medical History:   Diagnosis Date     Hearing aid worn      Unspecified essential hypertension     Essential hypertension        FAMILY HISTORY:    Family History   Problem Relation Age of Onset     Hypertension Father      Lung Cancer Father      Diabetes Type 2  Father      Diabetes No family hx of      Eye Disorder No family hx of        REVIEW OF SYSTEMS:  12 point ROS was negative other than the symptoms noted above in the HPI.    PHYSICAL EXAMINATION:      Constitutional:  The patient was unaccompanied, well-groomed, and in no acute distress.     Skin: Normal:  warm and pink without rash   Neurologic: Alert and oriented x 3.  CN's III-XII within normal limits.  Voice normal.    Psychiatric: The patient's  affect was calm, cooperative, and appropriate.     Communication:  Normal; communicates verbally, normal voice quality.   Respiratory: Breathing comfortably without stridor or exertion of accessory muscles.    Head/Face:  Normocephalic and atraumatic.  No lesions or scars. No sinus tenderness.    Salivary glands -  Normal size, no tenderness, swelling, or palpable masses   Eyes: Pupils were equal and reactive.  Extraocular movement intact.         Nose: Sinuses were non-tender.  Anterior rhinoscopy revealed midline septum and absence of purulence or polyps.     Oral Cavity: Normal tongue, floor of mouth, buccal mucosa, and palate.  No lesions or masses on inspection or palpation.     Oropharynx: Normal mucosa, palate symmetric with normal elevation. No abnormal lymph tissue in the oropharynx.             Neck: Supple with normal laryngeal and tracheal landmarks.  The parotid beds were without masses.  No palpable thyroid.  Normal range of motion   Lymphatic: There is no palpable lymphadenopathy in the neck.        Nasal endoscopy: Consent for nasal endoscopy was obtained.  I confirmed correctness of the procedure and identity of the patient.  Nasal endoscopy was indicated due to sinonasal malignancy.  The nose was topically decongested and anesthetized.  I used the flexible endoscope in order to gain access into the maxillary sinus.  The scope was passed under endoscopic visualization.  On the right side there is evidence of prior sinonasal surgery there is a large maxillary and antrostomy.  There is a large sphenoidotomy.  I do not see any masses or lesions concerning for malignancy.  I was able to gain access into the right maxillary sinus.  I do not see any concerning lesions within the maxillary sinus.     MRI Sinus 7/17/2024:  Impression: Status post surgical and postradiation changes in the  right side of the sinonasal region. Heterogeneous enhancing signal  changes along the right maxillary sinus and  extending through the bony  defect into the right retromaxillary fat pad and the pterygopalatine  fossa. When evaluated with the FDG PET appearances from same day,  findings are felt to be most likely postsurgical. Attention on  follow-up is recommended.    PET CT 7/17/2024:  1. Postoperative and postradiation changes in the sinonasal region and   mucosal spaces with no evidence of recurrent tumor.   2. No new cervical lymphadenopathy.   3. No evidence of new distant metastasis.   4. Stable mildly FDG avid left apical lung nodule dating back to   12/29/2023 ...     IMPRESSION AND PLAN: Patient is 3 months out from completing his treatment for right sided sinonasal squamous cell carcinoma.  Patient is doing well.  We are going to review his images in tumor board tomorrow.  Otherwise I will see him back in 2 months for another clinical follow-up.  Most likely we will obtain another set of scans and MRI in 3 months.        Karina Kumar MD, M.S.  Otolaryngology- Head & Neck Surgery  [phone]           Again, thank you for allowing me to participate in the care of your patient.      Sincerely,    Karina Kumar MD

## 2024-07-26 ENCOUNTER — TUMOR CONFERENCE (OUTPATIENT)
Dept: ONCOLOGY | Facility: CLINIC | Age: 69
End: 2024-07-26
Payer: COMMERCIAL

## 2024-07-26 ENCOUNTER — TRANSFERRED RECORDS (OUTPATIENT)
Dept: MULTI SPECIALTY CLINIC | Facility: CLINIC | Age: 69
End: 2024-07-26

## 2024-07-26 DIAGNOSIS — C30.0 SQUAMOUS CELL CARCINOMA OF NASAL CAVITY (H): Primary | ICD-10-CM

## 2024-07-26 LAB — RETINOPATHY: NORMAL

## 2024-08-01 NOTE — TUMOR CONFERENCE
Dr. Cole called and spoke to patient regarding next steps for PET scan in 3 months. Schedulers will call patient to schedule imaging.     Laura Hui, RN, BSN  RN Care Coordinator, ENT Clinic

## 2024-08-02 ENCOUNTER — TELEPHONE (OUTPATIENT)
Dept: OTOLARYNGOLOGY | Facility: CLINIC | Age: 69
End: 2024-08-02
Payer: COMMERCIAL

## 2024-08-12 ENCOUNTER — OFFICE VISIT (OUTPATIENT)
Dept: FAMILY MEDICINE | Facility: CLINIC | Age: 69
End: 2024-08-12
Payer: COMMERCIAL

## 2024-08-12 VITALS
WEIGHT: 156 LBS | BODY MASS INDEX: 24.48 KG/M2 | HEIGHT: 67 IN | OXYGEN SATURATION: 98 % | RESPIRATION RATE: 20 BRPM | DIASTOLIC BLOOD PRESSURE: 76 MMHG | HEART RATE: 80 BPM | SYSTOLIC BLOOD PRESSURE: 114 MMHG | TEMPERATURE: 97.9 F

## 2024-08-12 DIAGNOSIS — C31.9 MALIGNANT NEOPLASM OF PARANASAL SINUS (H): ICD-10-CM
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
## 2024-08-12 DIAGNOSIS — I10 PRIMARY HYPERTENSION: ICD-10-CM

## 2024-08-12 DIAGNOSIS — E11.9 TYPE 2 DIABETES MELLITUS WITHOUT COMPLICATION, WITHOUT LONG-TERM CURRENT USE OF INSULIN (H): ICD-10-CM

## 2024-08-12 PROCEDURE — 99213 OFFICE O/P EST LOW 20 MIN: CPT | Performed by: INTERNAL MEDICINE

## 2024-08-12 PROCEDURE — G2211 COMPLEX E/M VISIT ADD ON: HCPCS | Performed by: INTERNAL MEDICINE

## 2024-08-12 NOTE — PROGRESS NOTES
RADIATION ONCOLOGY FOLLOW-UP NOTE  Date of Visit: 2024    Patient Name: Opal Benitez  MRN: [id number]  : 1955    DISEASE TREATED: Right sinonasal squamous cell carcinoma, [id number], pT1N0     RADIATION THERAPY DELIVERED:   6000 cGy in 30 fractions to the paranasal sinus and 5400 cGy in 30 fractions to the right neck between 3/11/2024 and 2024 with IMRT.    INTERVAL SINCE COMPLETION OF RADIATION THERAPY: 14 weeks    SUBJECTIVE:   Opal Benitez is a 68 year old male with squamous cell carcinoma of the sinus who presented with nasal congestion.  He underwent right maxillary antrostomy, right total ethmoidectomy and right sphenoidotomy on 10/26/2023.  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  NATHAN staining was negative.    He saw Dr. Kumar on 2023 at which time nasal endoscopy demonstrated some right-sided granulation tissue/papilloma tissue in the area of the anterior and posterior ethmoids.     PET CT scan on 2023 demonstrated a peripheral soft tissue density within the right maxillary sinus with focal hypermetabolism at the superior aspect of the antrostomy along with the superior medial aspect of the right maxillary sinus.  There was mild FDG uptake in the left level IIB lymph node.  On the whole-body PET CT scan, there was a 1.1 cm left apical subpleural nodule with mild FDG uptake.    MRI of the paranasal sinuses on 2024 demonstrated postsurgical changes with residual T1 hyperintense T2 intermediate signal and postcontrast heterogeneously enhancing tissue in the right lower posterior ethmoid air cells extending to involve the superior right maxillary sinus along the inferior and medial right orbital wall.  In total the residual mass measured 4.1 x 1.2 cm.  There was destruction of the posterior aspects of the medial and inferior orbital wall and the osseous invasion included the infraorbital foramen with invasion  of the right infraorbital nerve.  There was abutment of the right inferior and medial rectus muscles.    His case was discussed in multidisciplinary tumor conference with recommendation for biopsy of the subpleural nodule.  He did meet with a thoracic surgeon, Dr. Singer, but opted to observe the nodule instead of biopsy.    He underwent radiation as described above.  Overall, he tolerated treatment well.  He experienced anorexia with minimal weight loss.  He did not require a feeding tube.    Since completing radiation, he has recovered well.  He is eating a regular diet and has no difficulty with swallowing.  He is not experiencing any pain.  He has weaned off of gabapentin.    He underwent MRI of the paranasal sinuses on 7/17/2024 which showed postsurgical and postradiation changes in the right sinonasal region.  They reported a heterogeneous enhancing signal changes along the right maxillary sinus extending through the bony defect into the right retromolar maxillary fat pad in the pterygopalatine fossa.  PET CT scan on the same day showed no FDG uptake in this area and changes were felt to be most likely due to the postsurgical changes.  PET CT scan on 7/17/2024 showed no cervical lymphadenopathy and postoperative/postradiation changes in the sinonasal region and mucosal spaces with no evidence of recurrent tumor.  The left apical lung nodule first seen on 12/29/2023 was considered stable.    He is scheduled to see Dr. Kumar tomorrow for nasal endoscopy and exam.    With regards to his pulmonary nodule, he is under the care of Dr. Singer who is following him conservatively with plans for surgery if there is enlargement of the nodule.    Today, he is feeling well.  He has minimal nasal stuffiness.  He still has some fatigue but was able to take his trip to [place].  While he did enjoy his trip, he did have decreased stamina and continues to struggle with residual fatigue.    He is eating food  without difficulty and has mostly had return of taste although xerostomia persists.  He is not having any difficulty with vision or hearing.    PAST MEDICAL/SURGICAL HISTORY:   Past Medical History:   Diagnosis Date    Hearing aid worn     Unspecified essential hypertension     Essential hypertension      Past Surgical History:   Procedure Laterality Date    COLOGUARD(EXACT SCIENCES)  12/10/2018    COLOGUARD(EXACT SCIENCES)  11/18/2015    CYSTOSCOPY  2000    OPTICAL TRACKING SYSTEM ENDOSCOPIC SINUS SURGERY N/A 1/26/2024    Procedure: Stealth guided transnasal approach to resect sinonasal tumor with Rivero-Chito approach.;  Surgeon: Karina Kumar MD;  Location:  OR     ALLERGIES:  No Known Allergies    MEDICATIONS:   Current Outpatient Medications   Medication Sig Dispense Refill    amoxicillin-clavulanate (AUGMENTIN) 875-125 MG tablet Take 1 tablet by mouth 2 times daily for 7 days 14 tablet 0    dapagliflozin (FARXIGA) 10 MG TABS tablet Take 1 tablet (10 mg) by mouth daily 90 tablet 3    lisinopril-hydrochlorothiazide (ZESTORETIC) 20-12.5 MG tablet Take 1 tablet by mouth daily 90 tablet 3    metFORMIN (GLUCOPHAGE XR) 500 MG 24 hr tablet Take 4 tablets (2,000 mg) by mouth daily 360 tablet 3    pravastatin (PRAVACHOL) 40 MG tablet Take 1 tablet (40 mg) by mouth daily 90 tablet 3     REVIEW OF SYSTEMS: A 12-point review of systems was obtained. Pertinent findings are noted in the HPI and are otherwise unremarkable.     PHYSICAL EXAM:  VITALS: /74   Pulse 64   Resp 16   Wt 72.1 kg (159 lb)   BMI 25.28 kg/m  , initial weight prior to starting radiation 74.4 kg (164 pounds)  GEN: Alert, oriented, in no acute distress  HEENT: Normocephalic, no sinus tenderness, oral cavity without mucosal lesions, no thrush, foreshortened uvula, nasal cavity without crusting or purulence  Neck: No palpable adenopathy, no lymphedema or if it is present, it is minimally apparent at this time.  Full range of  motion  Respiratory: Breathing comfortably on room air, no increased work of breathing or wheezing  Cardiovascular: Regular rate, extremities warm, well-perfused  Skin: No residual erythema in the area of radiation treatment field, minimal hyperpigmentation  Neuro: Cranial nerves II through XII are grossly intact, gait normal    LABS AND IMAGING:  IMAGING: Please refer to history of present illness for discussion of patient's recent MRI paranasal sinus and PET CT scan performed on 7/17/2024.  I have personally reviewed these images and agree with findings as stated.    Lab Results   Component Value Date    WBC 5.9 06/17/2024    HGB 14.5 06/17/2024    HCT 45.1 06/17/2024    MCV 92 06/17/2024     06/17/2024     Lab Results   Component Value Date    CR 1.11 06/17/2024     Lab Results   Component Value Date     06/17/2024    POTASSIUM 4.6 06/17/2024    CHLORIDE 98 06/17/2024    CO2 25 06/17/2024     (H) 06/17/2024     Lab Results   Component Value Date    AST 18 06/17/2024    ALT 8 06/17/2024    ALKPHOS 76 06/17/2024    BILITOTAL 1.0 06/17/2024      IMPRESSION/RECOMMENDATION:  Opal Benitez is a 68 year old male with a right sinonasal squamous cell carcinoma, dV6G5WD, pT1N0. 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  NATHAN staining was negative.  He underwent adjuvant radiation as described above.  He is now 3 months post completion of adjuvant radiation with restaging scans showing no evidence of malignancy in the region of the paranasal sinuses, neck.  He will see Dr. Douglas Vasquez tomorrow for scope exam and his case will be discussed at multidisciplinary tumor conference later this week for consensus regarding follow-up imaging.  Given persistent pulmonary nodule which has been stable since December 2023 and changes on patient's MRI sinus (albeit  negative on PET), he will likely require repeat imaging in 3 to 6 months with PET CT scan.  He will continue with frequent follow-up for nasal endoscopy with Dr. Douglas Vasquez.    We discussed options for treatment of xerostomia including Evoxac.  Currently, the patient wishes to wait to see if he experiences further recovery of salivary function prior to taking any medications.    I have asked him to return for follow-up in 6 months for continued monitoring of radiation side effects.  He can coordinate this appointment with one to see Dr. Douglas Vasquez.    We appreciate the opportunity to participate in Mr. Opal Benitez's care.  Please call with any questions.    50 minutes spent by me on the date of the encounter doing chart review, history and exam, documentation and further activities per the note.    Johana Galeas MD  Radiation Oncology    CC:  Patient Care Team:  Aiden Drummond MD as PCP - General (Internal Medicine)  Aiden Drummond MD as Assigned PCP  Karina Kumar MD as MD (Otolaryngology)  Karina Kumar MD as Assigned Surgical Provider  Johana Galeas MD as MD (Radiation Oncology)  Bernardino Singer MD as MD (Cardiovascular & Thoracic Surgery)  Bernardino Singer MD as Assigned Heart and Vascular Provider  Johana Galeas MD as Assigned Cancer Care Provider     This note was dictated with voice recognition software and then edited. Please excuse any unintentional errors.

## 2024-08-12 NOTE — PROGRESS NOTES
Assessment & Plan     Cat bite, initial encounter  Likely a scratch instead of a bite.  Patient was attacked from behind lesions and history suggest scratch.  He is up-to-date on tetanus.  The animal is not rabid and has been immunized.  - amoxicillin-clavulanate (AUGMENTIN) 875-125 MG tablet; Take 1 tablet by mouth 2 times daily for 7 days    Malignant neoplasm of paranasal sinus (H)  Recently presented at tumor board with some discrepancy between MRI and PET/CT which will be followed up with a PET/CT in November    Primary hypertension  Controlled    Type 2 diabetes mellitus without complication, without long-term current use of insulin (H)  Controlled                Subjective   Opal is a 68 year old, presenting for the following health issues:  Cat Bite (Pt c/o cat bite on left ankle x 1 day)      8/12/2024    10:34 AM   Additional Questions   Roomed by Yannick LEZAMA     Via the [employer] Maintenance questionnaire, the patient has reported the following services have been completed -Eye Exam: [employer] 2024-07-26, this information has been sent to the abstraction team.  History of Present Illness       Reason for visit:  Cat bite    He eats 4 or more servings of fruits and vegetables daily.He consumes 1 sweetened beverage(s) daily.He exercises with enough effort to increase his heart rate 30 to 60 minutes per day.  He exercises with enough effort to increase his heart rate 6 days per week.   He is taking medications regularly.     Patient's Son's attacked him he was concerned it could be a bite though he did not see it occur.  The cat has scratched him at least once previously.  The attack was yesterday.  It was washed with soap and water.  No symptoms associated with it.  The animals been immunized for rabies.  He is up-to-date on his tetanus vaccination.  The findings of the tumor board which we did.              Review of Systems  Constitutional, HEENT, cardiovascular, pulmonary, gi and gu systems are negative,  "except as otherwise noted.      Objective    /76 (BP Location: Right arm, Patient Position: Sitting, Cuff Size: Adult Regular)   Pulse 80   Temp 97.9  F (36.6  C) (Tympanic)   Resp 20   Ht 1.689 m (5' 6.5\")   Wt 70.8 kg (156 lb)   SpO2 98%   BMI 24.80 kg/m    Body mass index is 24.8 kg/m .  Physical Exam   Healthy-appearing man in no distress  Just proximal to the left lateral ankle couple of lesions which look more like scratches and bite they do not appear to be punctures no joint pathology or tenderness.            The longitudinal plan of care for the diagnosis(es)/condition(s) as documented were addressed during this visit. Due to the added complexity in care, I will continue to support Opal in the subsequent management and with ongoing continuity of care.    Signed Electronically by: Aiden Drummond MD    "

## 2024-08-24 NOTE — NURSING NOTE
CBC (White and Red blood cells), CMP (Liver, kidneys, sodium, potassium, calcium and sugars), and TSH (Thyroid) unremarkable.     Clearing for surgery.     Thank you    Depression Screening Entered On:  11/18/2019 1:10 PM CST    Performed On:  11/15/2019 1:09 PM CST by Ella April               Depression Screening   Little Interest - Pleasure in Activities :   Not at all   Feeling Down, Depressed, Hopeless :   Not at all   Initial Depression Screen Score :   0    Trouble Falling or Staying Asleep :   Not at all   Feeling Tired or Little Energy :   Not at all   Poor Appetite or Overeating :   Not at all   Trouble Concentrating :   Not at all   Moving or Speaking Slowly :   Not at all   Thoughts Better Off Dead or Hurting Self :   Not at all   BEENA Difficulty with Work, Home, Others :   Not difficult at all   Ella , April - 11/18/2019 1:09 PM CST

## 2024-09-20 ENCOUNTER — OFFICE VISIT (OUTPATIENT)
Dept: OTOLARYNGOLOGY | Facility: CLINIC | Age: 69
End: 2024-09-20
Payer: COMMERCIAL

## 2024-09-20 VITALS
SYSTOLIC BLOOD PRESSURE: 145 MMHG | DIASTOLIC BLOOD PRESSURE: 87 MMHG | HEIGHT: 67 IN | HEART RATE: 96 BPM | WEIGHT: 159.8 LBS | OXYGEN SATURATION: 99 % | BODY MASS INDEX: 25.08 KG/M2

## 2024-09-20 DIAGNOSIS — C31.9 MALIGNANT NEOPLASM OF ACCESSORY SINUS (H): Primary | ICD-10-CM

## 2024-09-20 PROCEDURE — 31231 NASAL ENDOSCOPY DX: CPT | Performed by: OTOLARYNGOLOGY

## 2024-09-20 PROCEDURE — 99214 OFFICE O/P EST MOD 30 MIN: CPT | Mod: 25 | Performed by: OTOLARYNGOLOGY

## 2024-09-20 ASSESSMENT — PAIN SCALES - GENERAL: PAINLEVEL: NO PAIN (0)

## 2024-09-20 NOTE — LETTER
9/20/2024       RE: Opal Benitez  [address] [address]     Dear Colleague,    Thank you for referring your patient, Opal Benitez, to the [facility] [place] at [facility]. Please see a copy of my visit note below.    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   Completed radiation therapy on : April 19, 2024        Path Result 01/26/2024  Final Diagnosis   A.SINUS CONTENTS, ETHMOID AND RIGHT INFERIOR TURBINATE:  - Benign respiratory mucosa and turbinate with non-specific chronic inflammation and fibrosis  - No evidence of high grade dysplasia/carcinoma     B. RIGHT SPHENOID SINUS, BIOPSY:  - Negative for high grade dysplasia/carcinoma     C. RIGHT ETHMOID, BIOPSY:  - Negative for high grade dysplasia/carcinoma     D. SINUS CONTENTS, RIGHT POSTERIOR ETHMOID:  - Fragments of inverted squamous papilloma  - Negative for high grade dysplasia/carcinoma     E. SINUS CONTENTS, RIGHT ANTERIOR ETHMOID:  - Fragments of inverted squamous papilloma  - No evidence of high grade dysplasia/carcinoma     F. RIGHT INFRA ORBITAL NERVE, BIOPSY:  - Nerve and bone tissue, negative for carcinoma     G. MAXILLARY SINUS, RIGHT, SIMPLE EXCISION:  - SQUAMOUS CELL CARCINOMA, FOCALLY INVASIVE in a background of granulation tissue and fibrosis  - No perineural or lymphovascular invasion is seen  - Peripheral and deep resection margins are negative  - AJCC Pathologic Stage: pT1     H. ORBITAL FLOOR BONE, BIOPSY:  - Negative for carcinoma     I. SINUS CONTENT, RIGHT ETHMOID AND TURBINATE:  - Fragment of inverted squamous papilloma  - Negative for high grade dysplasia/carcinoma     J. RIGHT MAXILLARY SINUS, POSTERIOR BONY WALL:  - Negative for carcinoma        History of Present Illness: 68-year-old male here in  the otolaryngology clinic for follow-up regarding his right sinonasal squamous cell carcinoma.  The patient is doing really well.  He denies any sinonasal symptoms.  He is gaining weight.  No visual changes.    MEDICATIONS:     Current Outpatient Medications   Medication Sig Dispense Refill     dapagliflozin (FARXIGA) 10 MG TABS tablet Take 1 tablet (10 mg) by mouth daily 90 tablet 3     lisinopril-hydrochlorothiazide (ZESTORETIC) 20-12.5 MG tablet Take 1 tablet by mouth daily 90 tablet 3     metFORMIN (GLUCOPHAGE XR) 500 MG 24 hr tablet Take 4 tablets (2,000 mg) by mouth daily 360 tablet 3     pravastatin (PRAVACHOL) 40 MG tablet Take 1 tablet (40 mg) by mouth daily 90 tablet 3       ALLERGIES:  No Known Allergies      PAST MEDICAL HISTORY:   Past Medical History:   Diagnosis Date     Hearing aid worn      Unspecified essential hypertension     Essential hypertension        FAMILY HISTORY:    Family History   Problem Relation Age of Onset     Hypertension Father      Lung Cancer Father      Diabetes Type 2  Father      Diabetes No family hx of      Eye Disorder No family hx of        REVIEW OF SYSTEMS:  12 point ROS was negative other than the symptoms noted above in the HPI.    PHYSICAL EXAMINATION:      Constitutional:  The patient was unaccompanied, well-groomed, and in no acute distress.     Skin: Normal:  warm and pink without rash   Neurologic: Alert and oriented x 3.  CN's III-XII within normal limits.  Voice normal.    Psychiatric: The patient's affect was calm, cooperative, and appropriate.     Communication:  Normal; communicates verbally, normal voice quality.   Respiratory: Breathing comfortably without stridor or exertion of accessory muscles.    Head/Face:  Normocephalic and atraumatic.  No lesions or scars. No sinus tenderness.    Salivary glands -  Normal size, no tenderness, swelling, or palpable masses   Eyes: Pupils were equal and reactive.  Extraocular movement intact.         Nose: Sinuses were  non-tender.  Anterior rhinoscopy revealed midline septum and absence of purulence or polyps.     Oral Cavity: Normal tongue, floor of mouth, buccal mucosa, and palate.  No lesions or masses on inspection or palpation.     Oropharynx: Normal mucosa, palate symmetric with normal elevation. No abnormal lymph tissue in the oropharynx.             Neck: Supple with normal laryngeal and tracheal landmarks.  The parotid beds were without masses.  No palpable thyroid.  Normal range of motion   Lymphatic: There is no palpable lymphadenopathy in the neck.          Nasal endoscopy: Consent for nasal endoscopy was obtained.  I confirmed correctness of the procedure and identity of the patient.  Nasal endoscopy was indicated due to right sinonasal squamous cell carcinoma.  The nose was topically decongested and anesthetized.  The nasal endoscope was passed under endoscopic vision.  On the right side there is evidence of prior sinonasal surgery with anterior posterior ethmoidectomy and a large maxillary antrostomy.  There is crust on the posterior wall of the maxillary sinus this was removed today.  The underlying mucosa in this area looks normal.  Sphenoid sinusotomy is wide open.  There is no evidence of masses lesions that are concerning for recurrence on today's nasal endoscopy.       IMPRESSION AND PLAN: 68-year-old gentleman with a right sinonasal squamous cell carcinoma status post surgery followed by radiation therapy.  He is doing well.  Today's nasal endoscopy did not reveal any evidence of local regional disease.  The plan is to have his follow-up MRI in November 2024 and a clinical follow-up in December 2024.    Thank you very much for the opportunity to participate in the care of your patient.      Karina Kumar MD, M.S.  Otolaryngology- Head & Neck Surgery  [phone]           Again, thank you for allowing me to participate in the care of your patient.      Sincerely,    Karina Kumar,  MD

## 2024-09-20 NOTE — PATIENT INSTRUCTIONS
You were seen in the ENT Clinic today by Dr. Kumar. If you have any questions or concerns after your appointment, please contact us (see below)    Please return to clinic in 3 months for follow up with Dr. Cole     How to Contact Us:  Send a [employer] message to your provider. Our team will respond to you via [employer]. Occasionally, we will need to call you to get further information.  For urgent matters (Monday-Friday), call the ENT Clinic: [phone] and speak with a call center team member - they will route your call appropriately.   If you'd like to speak directly with a nurse, please find our contact information below. We do our best to check voicemail frequently throughout the day, and will work to call you back within 1-2 days. For urgent matters, please use the general clinic phone numbers listed above.    Laura BAXTER RN, BSN  RN Care Coordinator, ENT Clinic  [facility]  Direct: [phone]

## 2024-09-20 NOTE — PROGRESS NOTES
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   Completed radiation therapy on : April 19, 2024        Path Result 01/26/2024  Final Diagnosis   A.SINUS CONTENTS, ETHMOID AND RIGHT INFERIOR TURBINATE:  - Benign respiratory mucosa and turbinate with non-specific chronic inflammation and fibrosis  - No evidence of high grade dysplasia/carcinoma     B. RIGHT SPHENOID SINUS, BIOPSY:  - Negative for high grade dysplasia/carcinoma     C. RIGHT ETHMOID, BIOPSY:  - Negative for high grade dysplasia/carcinoma     D. SINUS CONTENTS, RIGHT POSTERIOR ETHMOID:  - Fragments of inverted squamous papilloma  - Negative for high grade dysplasia/carcinoma     E. SINUS CONTENTS, RIGHT ANTERIOR ETHMOID:  - Fragments of inverted squamous papilloma  - No evidence of high grade dysplasia/carcinoma     F. RIGHT INFRA ORBITAL NERVE, BIOPSY:  - Nerve and bone tissue, negative for carcinoma     G. MAXILLARY SINUS, RIGHT, SIMPLE EXCISION:  - SQUAMOUS CELL CARCINOMA, FOCALLY INVASIVE in a background of granulation tissue and fibrosis  - No perineural or lymphovascular invasion is seen  - Peripheral and deep resection margins are negative  - AJCC Pathologic Stage: pT1     H. ORBITAL FLOOR BONE, BIOPSY:  - Negative for carcinoma     I. SINUS CONTENT, RIGHT ETHMOID AND TURBINATE:  - Fragment of inverted squamous papilloma  - Negative for high grade dysplasia/carcinoma     J. RIGHT MAXILLARY SINUS, POSTERIOR BONY WALL:  - Negative for carcinoma        History of Present Illness: 68-year-old male here in the otolaryngology clinic for follow-up regarding his right sinonasal squamous cell carcinoma.  The patient is doing really well.  He denies any sinonasal symptoms.  He is gaining weight.  No visual changes.    MEDICATIONS:     Current Outpatient Medications   Medication Sig Dispense Refill    dapagliflozin (FARXIGA) 10  MG TABS tablet Take 1 tablet (10 mg) by mouth daily 90 tablet 3    lisinopril-hydrochlorothiazide (ZESTORETIC) 20-12.5 MG tablet Take 1 tablet by mouth daily 90 tablet 3    metFORMIN (GLUCOPHAGE XR) 500 MG 24 hr tablet Take 4 tablets (2,000 mg) by mouth daily 360 tablet 3    pravastatin (PRAVACHOL) 40 MG tablet Take 1 tablet (40 mg) by mouth daily 90 tablet 3       ALLERGIES:  No Known Allergies      PAST MEDICAL HISTORY:   Past Medical History:   Diagnosis Date    Hearing aid worn     Unspecified essential hypertension     Essential hypertension        FAMILY HISTORY:    Family History   Problem Relation Age of Onset    Hypertension Father     Lung Cancer Father     Diabetes Type 2  Father     Diabetes No family hx of     Eye Disorder No family hx of        REVIEW OF SYSTEMS:  12 point ROS was negative other than the symptoms noted above in the HPI.    PHYSICAL EXAMINATION:      Constitutional:  The patient was unaccompanied, well-groomed, and in no acute distress.     Skin: Normal:  warm and pink without rash   Neurologic: Alert and oriented x 3.  CN's III-XII within normal limits.  Voice normal.    Psychiatric: The patient's affect was calm, cooperative, and appropriate.     Communication:  Normal; communicates verbally, normal voice quality.   Respiratory: Breathing comfortably without stridor or exertion of accessory muscles.    Head/Face:  Normocephalic and atraumatic.  No lesions or scars. No sinus tenderness.    Salivary glands -  Normal size, no tenderness, swelling, or palpable masses   Eyes: Pupils were equal and reactive.  Extraocular movement intact.         Nose: Sinuses were non-tender.  Anterior rhinoscopy revealed midline septum and absence of purulence or polyps.     Oral Cavity: Normal tongue, floor of mouth, buccal mucosa, and palate.  No lesions or masses on inspection or palpation.     Oropharynx: Normal mucosa, palate symmetric with normal elevation. No abnormal lymph tissue in the oropharynx.              Neck: Supple with normal laryngeal and tracheal landmarks.  The parotid beds were without masses.  No palpable thyroid.  Normal range of motion   Lymphatic: There is no palpable lymphadenopathy in the neck.          Nasal endoscopy: Consent for nasal endoscopy was obtained.  I confirmed correctness of the procedure and identity of the patient.  Nasal endoscopy was indicated due to right sinonasal squamous cell carcinoma.  The nose was topically decongested and anesthetized.  The nasal endoscope was passed under endoscopic vision.  On the right side there is evidence of prior sinonasal surgery with anterior posterior ethmoidectomy and a large maxillary antrostomy.  There is crust on the posterior wall of the maxillary sinus this was removed today.  The underlying mucosa in this area looks normal.  Sphenoid sinusotomy is wide open.  There is no evidence of masses lesions that are concerning for recurrence on today's nasal endoscopy.       IMPRESSION AND PLAN: 68-year-old gentleman with a right sinonasal squamous cell carcinoma status post surgery followed by radiation therapy.  He is doing well.  Today's nasal endoscopy did not reveal any evidence of local regional disease.  The plan is to have his follow-up MRI in November 2024 and a clinical follow-up in December 2024.    Thank you very much for the opportunity to participate in the care of your patient.      Karina Kumar MD, M.S.  Otolaryngology- Head & Neck Surgery  [phone]

## 2024-10-05 ENCOUNTER — HEALTH MAINTENANCE LETTER (OUTPATIENT)
Age: 69
End: 2024-10-05

## 2024-11-04 ENCOUNTER — HOSPITAL ENCOUNTER (OUTPATIENT)
Dept: PET IMAGING | Facility: CLINIC | Age: 69
Discharge: HOME | End: 2024-11-04
Attending: OTOLARYNGOLOGY
Payer: COMMERCIAL

## 2024-11-04 DIAGNOSIS — C30.0 SQUAMOUS CELL CARCINOMA OF NASAL CAVITY (H): ICD-10-CM

## 2024-11-04 PROCEDURE — 74177 CT ABD & PELVIS W/CONTRAST: CPT | Mod: 26 | Performed by: RADIOLOGY

## 2024-11-04 PROCEDURE — 78815 PET IMAGE W/CT SKULL-THIGH: CPT | Mod: 26 | Performed by: RADIOLOGY

## 2024-11-04 PROCEDURE — 70491 CT SOFT TISSUE NECK W/DYE: CPT

## 2024-11-04 PROCEDURE — A9552 F18 FDG: HCPCS | Performed by: OTOLARYNGOLOGY

## 2024-11-04 PROCEDURE — 78815 PET IMAGE W/CT SKULL-THIGH: CPT | Mod: PS

## 2024-11-04 PROCEDURE — 343N000001 HC RX 343 MED OP 636: Performed by: OTOLARYNGOLOGY

## 2024-11-04 PROCEDURE — 74177 CT ABD & PELVIS W/CONTRAST: CPT

## 2024-11-04 PROCEDURE — 70491 CT SOFT TISSUE NECK W/DYE: CPT | Mod: 26 | Performed by: RADIOLOGY

## 2024-11-04 PROCEDURE — 250N000011 HC RX IP 250 OP 636: Performed by: OTOLARYNGOLOGY

## 2024-11-04 PROCEDURE — 71260 CT THORAX DX C+: CPT | Mod: 26 | Performed by: RADIOLOGY

## 2024-11-04 RX ORDER — FLUDEOXYGLUCOSE F 18 200 MCI/ML
10-18 INJECTION, SOLUTION INTRAVENOUS ONCE
Status: COMPLETED | OUTPATIENT
Start: 2024-11-04 | End: 2024-11-04

## 2024-11-04 RX ORDER — IOPAMIDOL 755 MG/ML
10-135 INJECTION, SOLUTION INTRAVASCULAR ONCE
Status: COMPLETED | OUTPATIENT
Start: 2024-11-04 | End: 2024-11-04

## 2024-11-04 RX ADMIN — IOPAMIDOL 97 ML: 755 INJECTION, SOLUTION INTRAVENOUS at 13:11

## 2024-11-04 RX ADMIN — FLUDEOXYGLUCOSE F 18 10.03 MILLICURIE: 200 INJECTION, SOLUTION INTRAVENOUS at 12:17

## 2024-11-08 ENCOUNTER — TELEPHONE (OUTPATIENT)
Dept: OTOLARYNGOLOGY | Facility: CLINIC | Age: 69
End: 2024-11-08
Payer: COMMERCIAL

## 2024-11-08 NOTE — TELEPHONE ENCOUNTER
Called patient today to let him know about the PET CT results. Unfortunately I got his voice mail. I left a HIPAA compliant voice message

## 2024-11-08 NOTE — TELEPHONE ENCOUNTER
[facility] Call Center    Phone Message    May a detailed message be left on voicemail: yes     Reason for Call: Other: Pt calling back for CT results. Please call. Thanks     Action Taken: Other: ENT    Travel Screening: Not Applicable     Date of Service:

## 2024-11-15 NOTE — TELEPHONE ENCOUNTER
Dr. Cole discussed CT results with patient.     Laura Hui, RN, BSN  RN Care Coordinator, ENT Clinic

## 2024-12-07 ENCOUNTER — IMMUNIZATION (OUTPATIENT)
Dept: FAMILY MEDICINE | Facility: CLINIC | Age: 69
End: 2024-12-07
Payer: COMMERCIAL

## 2024-12-07 DIAGNOSIS — Z23 ENCOUNTER FOR IMMUNIZATION: Primary | ICD-10-CM

## 2024-12-07 PROCEDURE — 99207 PR NO CHARGE NURSE ONLY: CPT

## 2024-12-07 PROCEDURE — 90480 ADMN SARSCOV2 VAC 1/ONLY CMP: CPT

## 2024-12-07 PROCEDURE — 90662 IIV NO PRSV INCREASED AG IM: CPT

## 2024-12-07 PROCEDURE — 90471 IMMUNIZATION ADMIN: CPT

## 2024-12-07 PROCEDURE — 91320 SARSCV2 VAC 30MCG TRS-SUC IM: CPT

## 2024-12-07 NOTE — PROGRESS NOTES
Prior to immunization administration, verified patients identity using patient s name and date of birth. Please see Immunization Activity for additional information.     Is the patient's temperature normal (100.5 or less)? Yes     Patient MEETS CRITERIA. PROCEED with vaccine administration.      Patient instructed to remain in clinic for 15 minutes afterwards, and to report any adverse reactions.      Link to Ancillary Visit Immunization Standing Orders SmartSet     Screening performed by Leeann Wilson MA on 12/7/2024 at 9:26 AM.

## 2025-01-19 ENCOUNTER — HEALTH MAINTENANCE LETTER (OUTPATIENT)
Age: 70
End: 2025-01-19

## 2025-01-29 ENCOUNTER — OFFICE VISIT (OUTPATIENT)
Dept: RADIATION ONCOLOGY | Facility: CLINIC | Age: 70
End: 2025-01-29
Attending: RADIOLOGY
Payer: COMMERCIAL

## 2025-01-29 VITALS
DIASTOLIC BLOOD PRESSURE: 87 MMHG | SYSTOLIC BLOOD PRESSURE: 133 MMHG | HEART RATE: 78 BPM | BODY MASS INDEX: 25.76 KG/M2 | WEIGHT: 162 LBS

## 2025-01-29 DIAGNOSIS — C31.9 MALIGNANT NEOPLASM OF PARANASAL SINUS (H): Primary | ICD-10-CM

## 2025-01-29 LAB — TSH SERPL DL<=0.005 MIU/L-ACNC: 0.78 UIU/ML (ref 0.3–4.2)

## 2025-01-29 PROCEDURE — 3079F DIAST BP 80-89 MM HG: CPT | Performed by: RADIOLOGY

## 2025-01-29 PROCEDURE — 84443 ASSAY THYROID STIM HORMONE: CPT | Performed by: RADIOLOGY

## 2025-01-29 PROCEDURE — 99215 OFFICE O/P EST HI 40 MIN: CPT | Performed by: RADIOLOGY

## 2025-01-29 PROCEDURE — 99213 OFFICE O/P EST LOW 20 MIN: CPT | Performed by: RADIOLOGY

## 2025-01-29 PROCEDURE — 3075F SYST BP GE 130 - 139MM HG: CPT | Performed by: RADIOLOGY

## 2025-01-29 NOTE — PROGRESS NOTES
?TSH   of the right infraorbital nerve.  There was abutment of the right inferior and medial rectus muscles.    His case was discussed in multidisciplinary tumor conference with recommendation for biopsy of the subpleural nodule.  He did meet with a thoracic surgeon, Dr. Singer, but opted to observe the nodule instead of biopsy.    He underwent radiation as described above.  Overall, he tolerated treatment well.  He experienced anorexia with minimal weight loss.  He did not require a feeding tube.    He underwent MRI of the paranasal sinuses on 7/17/2024 which showed postsurgical and postradiation changes in the right sinonasal region.  They reported a heterogeneous enhancing signal changes along the right maxillary sinus extending through the bony defect into the right retromolar maxillary fat pad in the pterygopalatine fossa.  PET CT scan on the same day showed no FDG uptake in this area and changes were felt to be most likely due to the postsurgical changes.  PET CT scan on 7/17/2024 showed no cervical lymphadenopathy and postoperative/postradiation changes in the sinonasal region and mucosal spaces with no evidence of recurrent tumor.  The left apical lung nodule first seen on 12/29/2023 was considered stable.    With regards to his pulmonary nodule, he is under the care of Dr. Singer who is following him conservatively with plans for surgery if there is enlargement of the nodule.    PET CT scan on 11/4/2024 showed stable postoperative and postradiation changes in the sinonasal region and mucosal spaces with persistent mild/moderately avid mucosal thickening of the right maxillary sinus likely representing an inflammatory process with continued attention on follow-up.  There was no cervical lymphadenopathy.  There was no evidence of distant metastatic disease.  There was no change in the left apical lung nodule dating back to 12/29/2023.  Metabolic activity is less than prior and now similar to background.    He  saw Dr. Kumar on 12/12/2024 at which time nasal endoscopy showed no evidence of masses or lesions.    Today, he is feeling well.  He has minimal nasal stuffiness.  He still has some fatigue but was able to take his trip to [place] after radiation.  While he did enjoy his trip, he did have decreased stamina and continues to struggle with residual fatigue.    He is eating food without difficulty and has mostly had return of taste although xerostomia persists.  He is not having any difficulty with vision or hearing.  He is back to teaching full-time.    PAST MEDICAL/SURGICAL HISTORY:   Past Medical History:   Diagnosis Date    Hearing aid worn     Unspecified essential hypertension     Essential hypertension      Past Surgical History:   Procedure Laterality Date    COLOGUARD(EXACT SCIENCES)  12/10/2018    COLOGUARD(EXACT SCIENCES)  11/18/2015    CYSTOSCOPY  2000    OPTICAL TRACKING SYSTEM ENDOSCOPIC SINUS SURGERY N/A 1/26/2024    Procedure: Stealth guided transnasal approach to resect sinonasal tumor with Rivero-Chito approach.;  Surgeon: Karina Kumar MD;  Location:  OR     ALLERGIES:  No Known Allergies    MEDICATIONS:   Current Outpatient Medications   Medication Sig Dispense Refill    dapagliflozin (FARXIGA) 10 MG TABS tablet Take 1 tablet (10 mg) by mouth daily 90 tablet 3    lisinopril-hydrochlorothiazide (ZESTORETIC) 20-12.5 MG tablet Take 1 tablet by mouth daily 90 tablet 3    metFORMIN (GLUCOPHAGE XR) 500 MG 24 hr tablet Take 4 tablets (2,000 mg) by mouth daily 360 tablet 3    pravastatin (PRAVACHOL) 40 MG tablet Take 1 tablet (40 mg) by mouth daily 90 tablet 3     REVIEW OF SYSTEMS: A 12-point review of systems was obtained. Pertinent findings are noted in the HPI and are otherwise unremarkable.     PHYSICAL EXAM:  VITALS: /87   Pulse 78   Wt 73.5 kg (162 lb)   BMI 25.76 kg/m  , initial weight prior to starting radiation 74.4 kg (164 pounds)  GEN: Alert, oriented, in no  acute distress  HEENT: Normocephalic, no sinus tenderness, oral cavity without mucosal lesions, no thrush, foreshortened uvula, nasal cavity without crusting or purulence  Neck: No palpable adenopathy, no lymphedema or if it is present, it is minimally apparent at this time.  Full range of motion  Respiratory: Breathing comfortably on room air, no increased work of breathing or wheezing  Cardiovascular: Regular rate, extremities warm, well-perfused  Skin: No residual erythema in the area of radiation treatment field, minimal hyperpigmentation  Neuro: Cranial nerves II through XII are grossly intact, gait normal    LABS AND IMAGING:  IMAGING: Please refer to history of present illness for discussion of patient's follow-up MRI paranasal sinus and PET CT scan.  I have personally reviewed these images and agree with findings as stated.    Lab Results   Component Value Date    TSH 0.78 01/29/2025         IMPRESSION/RECOMMENDATION:  Opal Benitez is a 69 year old male with a right sinonasal squamous cell carcinoma, [id number], pT1N0. 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  NATHAN staining was negative.  He underwent adjuvant radiation as described above.  He is now 9 months post completion of adjuvant radiation with restaging scans showing no evidence of malignancy in the region of the paranasal sinuses, neck.  Most recent PET CT scan shows no evidence of distant metastatic disease.  Notably, the pulmonary nodule has stabilized and is no longer PET avid.    He will continue with frequent follow-up for nasal endoscopy with Dr. Douglas Vasquez.    He is scheduled for repeat imaging with MRI of the paranasal sinuses and chest CT scan on 7/3/2025.    We discussed options for treatment of xerostomia including Evoxac.  Currently, the patient wishes to wait to see if he experiences  further recovery of salivary function prior to taking any medications.    I have asked him to return for follow-up in another 6 months for continued monitoring of radiation side effects.  He can coordinate this appointment with one to see Dr. Douglas Vasquez.    We appreciate the opportunity to participate in Mr. Opal Benitez's care.  Please call with any questions.    50 minutes spent by me on the date of the encounter doing chart review, history and exam, documentation and further activities per the note.    Johana Galeas MD  Radiation Oncology    CC:  Patient Care Team:  Aiden Drummond MD as PCP - General (Internal Medicine)  Aiden Drummond MD as Assigned PCP  Karina Kumar MD as MD (Otolaryngology)  Karina Kumar MD as Assigned Surgical Provider  Johana Galeas MD as MD (Radiation Oncology)  Bernardino Singer MD as MD (Cardiovascular & Thoracic Surgery)  Bernardino Singer MD as Assigned Heart and Vascular Provider  Johana Galeas MD as Assigned Cancer Care Provider     This note was dictated with voice recognition software and then edited. Please excuse any unintentional errors.

## 2025-01-29 NOTE — PROGRESS NOTES
"Oncology Rooming Note    2025 2:31 PM   Opal Benitez is a 69 year old male who presents for:    Chief Complaint   Patient presents with    Oncology Clinic Visit     Follow up:Paranasal Sinus:H&N 6000 cGy completed 24     Initial Vitals: There were no vitals taken for this visit. Estimated body mass index is 25.68 kg/m  as calculated from the following:    Height as of 24: 1.689 m (5' 6.5\").    Weight as of 24: 73.3 kg (161 lb 8 oz). There is no height or weight on file to calculate BSA.  Data Unavailable Comment: Data Unavailable   No LMP for male patient.  Allergies reviewed: Yes  Medications reviewed: Yes    Medications: Medication refills not needed today.  Pharmacy name entered into EPIC:    [facility] - [place], [place] - [address] [place] [address] [id number] [address] [id number] [address] AT [facility]    Frailty Screening:   Is the patient here for a new oncology consult visit in cancer care? 2. No      Clinical concerns: Follow up FOLLOW-UP VISIT    Patient Name: Opal Benitez      : 1955     Age: 69 year old        ______________________________________________________________________________     Chief Complaint   Patient presents with    Oncology Clinic Visit     Follow up:Paranasal Sinus:H&N 6000 cGy completed 24     Pain  Denies    Labs  Other Labs: Yes: TSH    Imaging  CT: 24      Dental:   Most Recent Dental Visit: Yes    Speech/Swallowing:   Most Recent evaluation or testing: No  Swallowing Restrictions: No difficulties with swallowing    Trismus/Jaw Exercises: Yes    Nutrition:    Weight:   Wt Readings from Last 3 Encounters:   25 73.5 kg (162 lb)   24 73.3 kg (161 lb 8 oz)   24 72.5 kg (159 lb 12.8 oz)         Oral Symptoms:   Xerostomia:1- Symptomatic without significant dietary alteration; unstimulated saliva flow >0.2 ml/min  Dysphagia: " 0-None  Mucositis Oral Symptoms: 0-None  Mucositis: 0- None  Esophagitis:0- None    Other Appointments:     MD Name:  Appointment Date:    MD Name: Appointment Date:   MD Name: Appointment Date:   Other Appointment Notes:     Residual Radiation side effect: Dry Mouth               Eun Cano RN

## 2025-01-29 NOTE — LETTER
2025      Opal Benitez  [address] [facility] [address]      Dear Colleague,    Thank you for referring your patient, Opal Benitez, to the [facility] RADIATION ONCOLOGY. Please see a copy of my visit note below.    RADIATION ONCOLOGY FOLLOW-UP NOTE  Date of Visit: 2025    Patient Name: Opal Benitez  MRN: [id number]  : 1955    DISEASE TREATED: Right sinonasal squamous cell carcinoma, [id number], pT1N0     RADIATION THERAPY DELIVERED:   6000 cGy in 30 fractions to the paranasal sinus and 5400 cGy in 30 fractions to the right neck between 3/11/2024 and 2024 with IMRT.    INTERVAL SINCE COMPLETION OF RADIATION THERAPY: 9 months    SUBJECTIVE:   Opal Benitez is a 69 year old male with squamous cell carcinoma of the sinus who presented with nasal congestion.  He underwent right maxillary antrostomy, right total ethmoidectomy and right sphenoidotomy on 10/26/2023.  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  NATHAN staining was negative.    He saw Dr. Kumar on 2023 at which time nasal endoscopy demonstrated some right-sided granulation tissue/papilloma tissue in the area of the anterior and posterior ethmoids.     PET CT scan on 2023 demonstrated a peripheral soft tissue density within the right maxillary sinus with focal hypermetabolism at the superior aspect of the antrostomy along with the superior medial aspect of the right maxillary sinus.  There was mild FDG uptake in the left level IIB lymph node.  On the whole-body PET CT scan, there was a 1.1 cm left apical subpleural nodule with mild FDG uptake.    MRI of the paranasal sinuses on 2024 demonstrated postsurgical changes with residual T1 hyperintense T2 intermediate signal and postcontrast heterogeneously enhancing tissue in the right lower posterior ethmoid air cells extending to involve the superior right maxillary sinus along the inferior  and medial right orbital wall.  In total the residual mass measured 4.1 x 1.2 cm.  There was destruction of the posterior aspects of the medial and inferior orbital wall and the osseous invasion included the infraorbital foramen with invasion of the right infraorbital nerve.  There was abutment of the right inferior and medial rectus muscles.    His case was discussed in multidisciplinary tumor conference with recommendation for biopsy of the subpleural nodule.  He did meet with a thoracic surgeon, Dr. Singer, but opted to observe the nodule instead of biopsy.    He underwent radiation as described above.  Overall, he tolerated treatment well.  He experienced anorexia with minimal weight loss.  He did not require a feeding tube.    He underwent MRI of the paranasal sinuses on 7/17/2024 which showed postsurgical and postradiation changes in the right sinonasal region.  They reported a heterogeneous enhancing signal changes along the right maxillary sinus extending through the bony defect into the right retromolar maxillary fat pad in the pterygopalatine fossa.  PET CT scan on the same day showed no FDG uptake in this area and changes were felt to be most likely due to the postsurgical changes.  PET CT scan on 7/17/2024 showed no cervical lymphadenopathy and postoperative/postradiation changes in the sinonasal region and mucosal spaces with no evidence of recurrent tumor.  The left apical lung nodule first seen on 12/29/2023 was considered stable.    With regards to his pulmonary nodule, he is under the care of Dr. Singer who is following him conservatively with plans for surgery if there is enlargement of the nodule.    PET CT scan on 11/4/2024 showed stable postoperative and postradiation changes in the sinonasal region and mucosal spaces with persistent mild/moderately avid mucosal thickening of the right maxillary sinus likely representing an inflammatory process with continued attention on follow-up.  There  was no cervical lymphadenopathy.  There was no evidence of distant metastatic disease.  There was no change in the left apical lung nodule dating back to 12/29/2023.  Metabolic activity is less than prior and now similar to background.    He saw Dr. Kumar on 12/12/2024 at which time nasal endoscopy showed no evidence of masses or lesions.    Today, he is feeling well.  He has minimal nasal stuffiness.  He still has some fatigue but was able to take his trip to [place] after radiation.  While he did enjoy his trip, he did have decreased stamina and continues to struggle with residual fatigue.    He is eating food without difficulty and has mostly had return of taste although xerostomia persists.  He is not having any difficulty with vision or hearing.  He is back to teaching full-time.    PAST MEDICAL/SURGICAL HISTORY:   Past Medical History:   Diagnosis Date     Hearing aid worn      Unspecified essential hypertension     Essential hypertension      Past Surgical History:   Procedure Laterality Date     COLOGUAIVANA(EXACT SCIENCES)  12/10/2018     COLOGUAIVANA(EXACT SCIENCES)  11/18/2015     CYSTOSCOPY  2000     OPTICAL TRACKING SYSTEM ENDOSCOPIC SINUS SURGERY N/A 1/26/2024    Procedure: Stealth guided transnasal approach to resect sinonasal tumor with Rivero-Chito approach.;  Surgeon: Karina Kumar MD;  Location:  OR     ALLERGIES:  No Known Allergies    MEDICATIONS:   Current Outpatient Medications   Medication Sig Dispense Refill     dapagliflozin (FARXIGA) 10 MG TABS tablet Take 1 tablet (10 mg) by mouth daily 90 tablet 3     lisinopril-hydrochlorothiazide (ZESTORETIC) 20-12.5 MG tablet Take 1 tablet by mouth daily 90 tablet 3     metFORMIN (GLUCOPHAGE XR) 500 MG 24 hr tablet Take 4 tablets (2,000 mg) by mouth daily 360 tablet 3     pravastatin (PRAVACHOL) 40 MG tablet Take 1 tablet (40 mg) by mouth daily 90 tablet 3     REVIEW OF SYSTEMS: A 12-point review of systems was obtained.  Pertinent findings are noted in the HPI and are otherwise unremarkable.     PHYSICAL EXAM:  VITALS: /87   Pulse 78   Wt 73.5 kg (162 lb)   BMI 25.76 kg/m  , initial weight prior to starting radiation 74.4 kg (164 pounds)  GEN: Alert, oriented, in no acute distress  HEENT: Normocephalic, no sinus tenderness, oral cavity without mucosal lesions, no thrush, foreshortened uvula, nasal cavity without crusting or purulence  Neck: No palpable adenopathy, no lymphedema or if it is present, it is minimally apparent at this time.  Full range of motion  Respiratory: Breathing comfortably on room air, no increased work of breathing or wheezing  Cardiovascular: Regular rate, extremities warm, well-perfused  Skin: No residual erythema in the area of radiation treatment field, minimal hyperpigmentation  Neuro: Cranial nerves II through XII are grossly intact, gait normal    LABS AND IMAGING:  IMAGING: Please refer to history of present illness for discussion of patient's follow-up MRI paranasal sinus and PET CT scan.  I have personally reviewed these images and agree with findings as stated.    Lab Results   Component Value Date    TSH 0.78 01/29/2025         IMPRESSION/RECOMMENDATION:  Opal Benitez is a 69 year old male with a right sinonasal squamous cell carcinoma, [id number], pT1N0. 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  NATHAN staining was negative.  He underwent adjuvant radiation as described above.  He is now 9 months post completion of adjuvant radiation with restaging scans showing no evidence of malignancy in the region of the paranasal sinuses, neck.  Most recent PET CT scan shows no evidence of distant metastatic disease.  Notably, the pulmonary nodule has stabilized and is no longer PET avid.    He will continue with frequent follow-up for nasal endoscopy with  "Dr. Douglas Vasquez.    He is scheduled for repeat imaging with MRI of the paranasal sinuses and chest CT scan on 7/3/2025.    We discussed options for treatment of xerostomia including Evoxac.  Currently, the patient wishes to wait to see if he experiences further recovery of salivary function prior to taking any medications.    I have asked him to return for follow-up in another 6 months for continued monitoring of radiation side effects.  He can coordinate this appointment with one to see Dr. Douglas Vasquez.    We appreciate the opportunity to participate in Mr. Opal Benitez's care.  Please call with any questions.    50 minutes spent by me on the date of the encounter doing chart review, history and exam, documentation and further activities per the note.    Johana Galeas MD  Radiation Oncology    CC:  Patient Care Team:  Aiden Drummond MD as PCP - General (Internal Medicine)  Aiden Drummond MD as Assigned PCP  Karina Kumar MD as MD (Otolaryngology)  Karina Kumar MD as Assigned Surgical Provider  Johana Galeas MD as MD (Radiation Oncology)  Bernardino Singer MD as MD (Cardiovascular & Thoracic Surgery)  Bernardino Singer MD as Assigned Heart and Vascular Provider  Johana Galeas MD as Assigned Cancer Care Provider     This note was dictated with voice recognition software and then edited. Please excuse any unintentional errors.     Oncology Rooming Note    January 29, 2025 2:31 PM   Opal Benitez is a 69 year old male who presents for:    Chief Complaint   Patient presents with     Oncology Clinic Visit     Follow up:Paranasal Sinus:H&N 6000 cGy completed 04/19/24     Initial Vitals: There were no vitals taken for this visit. Estimated body mass index is 25.68 kg/m  as calculated from the following:    Height as of 12/12/24: 1.689 m (5' 6.5\").    Weight as of 12/12/24: 73.3 kg (161 lb 8 oz). There is no height or weight on file to calculate BSA.  Data " Unavailable Comment: Data Unavailable   No LMP for male patient.  Allergies reviewed: Yes  Medications reviewed: Yes    Medications: Medication refills not needed today.  Pharmacy name entered into EPIC:    [facility] - [place], [place] - [address] [id number] [place], [place] - [address] [id number] - [facility] [address]  AT [facility] [address]    Frailty Screening:   Is the patient here for a new oncology consult visit in cancer care? 2. No      Clinical concerns: Follow up FOLLOW-UP VISIT    Patient Name: Opal Benitez      : 1955     Age: 69 year old        ______________________________________________________________________________     Chief Complaint   Patient presents with     Oncology Clinic Visit     Follow up:Paranasal Sinus:H&N 6000 cGy completed 24     Pain  Denies    Labs  Other Labs: Yes: TSH    Imaging  CT: 24      Dental:   Most Recent Dental Visit: Yes    Speech/Swallowing:   Most Recent evaluation or testing: No  Swallowing Restrictions: No difficulties with swallowing    Trismus/Jaw Exercises: Yes    Nutrition:    Weight:   Wt Readings from Last 3 Encounters:   25 73.5 kg (162 lb)   24 73.3 kg (161 lb 8 oz)   24 72.5 kg (159 lb 12.8 oz)         Oral Symptoms:   Xerostomia:1- Symptomatic without significant dietary alteration; unstimulated saliva flow >0.2 ml/min  Dysphagia: 0-None  Mucositis Oral Symptoms: 0-None  Mucositis: 0- None  Esophagitis:0- None    Other Appointments:     MD Name:  Appointment Date:    MD Name: Appointment Date:   MD Name: Appointment Date:   Other Appointment Notes:     Residual Radiation side effect: Dry Mouth               Eun Cano RN                Again, thank you for allowing me to participate in the care of your patient.        Sincerely,        Johana Galeas MD    Electronically signed

## 2025-03-02 NOTE — PROGRESS NOTES
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   Completed radiation therapy on : April 19, 2024        Path Result 01/26/2024  Final Diagnosis   A.SINUS CONTENTS, ETHMOID AND RIGHT INFERIOR TURBINATE:  - Benign respiratory mucosa and turbinate with non-specific chronic inflammation and fibrosis  - No evidence of high grade dysplasia/carcinoma     B. RIGHT SPHENOID SINUS, BIOPSY:  - Negative for high grade dysplasia/carcinoma     C. RIGHT ETHMOID, BIOPSY:  - Negative for high grade dysplasia/carcinoma     D. SINUS CONTENTS, RIGHT POSTERIOR ETHMOID:  - Fragments of inverted squamous papilloma  - Negative for high grade dysplasia/carcinoma     E. SINUS CONTENTS, RIGHT ANTERIOR ETHMOID:  - Fragments of inverted squamous papilloma  - No evidence of high grade dysplasia/carcinoma     F. RIGHT INFRA ORBITAL NERVE, BIOPSY:  - Nerve and bone tissue, negative for carcinoma     G. MAXILLARY SINUS, RIGHT, SIMPLE EXCISION:  - SQUAMOUS CELL CARCINOMA, FOCALLY INVASIVE in a background of granulation tissue and fibrosis  - No perineural or lymphovascular invasion is seen  - Peripheral and deep resection margins are negative  - AJCC Pathologic Stage: pT1     H. ORBITAL FLOOR BONE, BIOPSY:  - Negative for carcinoma     I. SINUS CONTENT, RIGHT ETHMOID AND TURBINATE:  - Fragment of inverted squamous papilloma  - Negative for high grade dysplasia/carcinoma     J. RIGHT MAXILLARY SINUS, POSTERIOR BONY WALL:  - Negative for carcinoma         History of Present Illness: 69-year-old male here in the otolaryngology clinic for tumor surveillance follow-up.  Patient has been doing really well.  He denies any sinonasal symptoms.  Occasionally he has numbness on the upper lip.  He denies any sinonasal pain.  Denies epistaxis.    MEDICATIONS:     Current Outpatient Medications   Medication Sig Dispense Refill     dapagliflozin (FARXIGA) 10 MG TABS tablet Take 1 tablet (10 mg) by mouth daily 90 tablet 3    lisinopril-hydrochlorothiazide (ZESTORETIC) 20-12.5 MG tablet Take 1 tablet by mouth daily 90 tablet 3    metFORMIN (GLUCOPHAGE XR) 500 MG 24 hr tablet Take 4 tablets (2,000 mg) by mouth daily 360 tablet 3    pravastatin (PRAVACHOL) 40 MG tablet Take 1 tablet (40 mg) by mouth daily 90 tablet 3       ALLERGIES:  No Known Allergies      PAST MEDICAL HISTORY:   Past Medical History:   Diagnosis Date    Hearing aid worn     Unspecified essential hypertension     Essential hypertension        FAMILY HISTORY:    Family History   Problem Relation Age of Onset    Hypertension Father     Lung Cancer Father     Diabetes Type 2  Father     Diabetes No family hx of     Eye Disorder No family hx of        REVIEW OF SYSTEMS:  12 point ROS was negative other than the symptoms noted above in the HPI.      ALLERGIES:  No Known Allergies      PHYSICAL EXAMINATION:      Constitutional:  The patient was unaccompanied, well-groomed, and in no acute distress.     Skin: Normal:  warm and pink without rash   Neurologic: Alert and oriented x 3.  CN's III-XII within normal limits.  Voice normal.    Psychiatric: The patient's affect was calm, cooperative, and appropriate.     Communication:  Normal; communicates verbally, normal voice quality.   Respiratory: Breathing comfortably without stridor or exertion of accessory muscles.    Head/Face:  Normocephalic and atraumatic.  No lesions or scars. No sinus tenderness.    Salivary glands -  Normal size, no tenderness, swelling, or palpable masses   Eyes: Pupils were equal and reactive.  Extraocular movement intact.         Nose: Sinuses were non-tender.  Anterior rhinoscopy revealed midline septum and absence of purulence or polyps.     Oral Cavity: Normal tongue, floor of mouth, buccal mucosa, and palate.  No lesions or masses on inspection or palpation.     Oropharynx: Normal mucosa, palate symmetric  with normal elevation. No abnormal lymph tissue in the oropharynx.             Neck: Supple with normal laryngeal and tracheal landmarks.  The parotid beds were without masses.  No palpable thyroid.  Normal range of motion   Lymphatic: There is no palpable lymphadenopathy in the neck.          Nasal endoscopy: Consent for nasal endoscopy was obtained.  I confirmed correctness of the procedure and identity of the patient.  Nasal endoscopy was indicated due to history of sinonasal carcinoma.  The nose was topically decongested and anesthetized.  The nasal endoscope was passed under endoscopic vision.  On the right side the septum is in the midline.  There is absence of the right middle and inferior turbinate.  There is a large sinonasal cavity that is well mucosalized.  There is absence of right sphenoethmoidal recess.  The sphenoid sinus is open.  I was able to advance the scope into the sphenoid sinus which is totally clean.  On the left side there is normal septum.  There is normal inferior meatus and inferior turbinate.  The left middle turbinate and middle meatus are within normal limits.  The left sphenoethmoidal recess is normal.  Today's nasal endoscopy did not reveal any evidence of local disease.    IMPRESSION AND PLAN: 69-year-old male with diagnosis of right squamous cell carcinoma of the sinonasal passages T3 N0 M0.  The patient is doing well.  Today's nasal endoscopy and examination did not reveal any evidence of local regional disease.  The plan is to bring the patient back in July 2025 with sinus MRI and a chest CT      Karina Kumar MD, M.S.  Otolaryngology- Head & Neck Surgery  [phone]

## 2025-03-06 ENCOUNTER — OFFICE VISIT (OUTPATIENT)
Dept: OTOLARYNGOLOGY | Facility: CLINIC | Age: 70
End: 2025-03-06
Payer: COMMERCIAL

## 2025-03-06 VITALS
SYSTOLIC BLOOD PRESSURE: 116 MMHG | HEART RATE: 114 BPM | BODY MASS INDEX: 25.72 KG/M2 | DIASTOLIC BLOOD PRESSURE: 73 MMHG | WEIGHT: 163.9 LBS | OXYGEN SATURATION: 97 % | HEIGHT: 67 IN

## 2025-03-06 DIAGNOSIS — C31.9 MALIGNANT NEOPLASM OF ACCESSORY SINUS (H): Primary | ICD-10-CM

## 2025-03-06 ASSESSMENT — PAIN SCALES - GENERAL: PAINLEVEL_OUTOF10: NO PAIN (0)

## 2025-03-06 NOTE — NURSING NOTE
"Chief Complaint   Patient presents with    RECHECK   Blood pressure 116/73, pulse 114, height 1.689 m (5' 6.5\"), weight 74.3 kg (163 lb 14.4 oz), SpO2 97%. Edwardo Armendariz, EMT    "

## 2025-03-06 NOTE — PATIENT INSTRUCTIONS
You were seen in the ENT Clinic today by Dr. Kumar. If you have any questions or concerns after your appointment, please contact us (see below)    The following has been recommended for you based upon your appointment today:  MRI sinonasal and CT chest prior to appointment in July with Dr. Cole     Please return to clinic in July for follow up with Dr. Cole     How to Contact Us:  Send a [employer] message to your provider. Our team will respond to you via [employer]. Occasionally, we will need to call you to get further information.  For urgent matters (Monday-Friday), call the ENT Clinic: [phone] and speak with a call center team member - they will route your call appropriately.   If you'd like to speak directly with a nurse, please find our contact information below. We do our best to check voicemail frequently throughout the day, and will work to call you back within 1-2 days. For urgent matters, please use the general clinic phone numbers listed above.    Laura BAXTER RN, BSN  RN Care Coordinator, ENT Clinic  [facility]  Direct: [phone]

## 2025-03-06 NOTE — LETTER
3/6/2025       RE: Opal Benitez  [address] [facility] [place] [address]     Dear Colleague,    Thank you for referring your patient, Opal Benitez, to the [facility] [place] [facility]. Please see a copy of my visit note below.    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   Completed radiation therapy on : April 19, 2024        Path Result 01/26/2024  Final Diagnosis   A.SINUS CONTENTS, ETHMOID AND RIGHT INFERIOR TURBINATE:  - Benign respiratory mucosa and turbinate with non-specific chronic inflammation and fibrosis  - No evidence of high grade dysplasia/carcinoma     B. RIGHT SPHENOID SINUS, BIOPSY:  - Negative for high grade dysplasia/carcinoma     C. RIGHT ETHMOID, BIOPSY:  - Negative for high grade dysplasia/carcinoma     D. SINUS CONTENTS, RIGHT POSTERIOR ETHMOID:  - Fragments of inverted squamous papilloma  - Negative for high grade dysplasia/carcinoma     E. SINUS CONTENTS, RIGHT ANTERIOR ETHMOID:  - Fragments of inverted squamous papilloma  - No evidence of high grade dysplasia/carcinoma     F. RIGHT INFRA ORBITAL NERVE, BIOPSY:  - Nerve and bone tissue, negative for carcinoma     G. MAXILLARY SINUS, RIGHT, SIMPLE EXCISION:  - SQUAMOUS CELL CARCINOMA, FOCALLY INVASIVE in a background of granulation tissue and fibrosis  - No perineural or lymphovascular invasion is seen  - Peripheral and deep resection margins are negative  - AJCC Pathologic Stage: pT1     H. ORBITAL FLOOR BONE, BIOPSY:  - Negative for carcinoma     I. SINUS CONTENT, RIGHT ETHMOID AND TURBINATE:  - Fragment of inverted squamous papilloma  - Negative for high grade dysplasia/carcinoma     J. RIGHT MAXILLARY SINUS, POSTERIOR BONY WALL:  - Negative for carcinoma         History of Present Illness: 69-year-old male here in  the otolaryngology clinic for tumor surveillance follow-up.  Patient has been doing really well.  He denies any sinonasal symptoms.  Occasionally he has numbness on the upper lip.  He denies any sinonasal pain.  Denies epistaxis.    MEDICATIONS:     Current Outpatient Medications   Medication Sig Dispense Refill     dapagliflozin (FARXIGA) 10 MG TABS tablet Take 1 tablet (10 mg) by mouth daily 90 tablet 3     lisinopril-hydrochlorothiazide (ZESTORETIC) 20-12.5 MG tablet Take 1 tablet by mouth daily 90 tablet 3     metFORMIN (GLUCOPHAGE XR) 500 MG 24 hr tablet Take 4 tablets (2,000 mg) by mouth daily 360 tablet 3     pravastatin (PRAVACHOL) 40 MG tablet Take 1 tablet (40 mg) by mouth daily 90 tablet 3       ALLERGIES:  No Known Allergies      PAST MEDICAL HISTORY:   Past Medical History:   Diagnosis Date     Hearing aid worn      Unspecified essential hypertension     Essential hypertension        FAMILY HISTORY:    Family History   Problem Relation Age of Onset     Hypertension Father      Lung Cancer Father      Diabetes Type 2  Father      Diabetes No family hx of      Eye Disorder No family hx of        REVIEW OF SYSTEMS:  12 point ROS was negative other than the symptoms noted above in the HPI.      ALLERGIES:  No Known Allergies      PHYSICAL EXAMINATION:      Constitutional:  The patient was unaccompanied, well-groomed, and in no acute distress.     Skin: Normal:  warm and pink without rash   Neurologic: Alert and oriented x 3.  CN's III-XII within normal limits.  Voice normal.    Psychiatric: The patient's affect was calm, cooperative, and appropriate.     Communication:  Normal; communicates verbally, normal voice quality.   Respiratory: Breathing comfortably without stridor or exertion of accessory muscles.    Head/Face:  Normocephalic and atraumatic.  No lesions or scars. No sinus tenderness.    Salivary glands -  Normal size, no tenderness, swelling, or palpable masses   Eyes: Pupils were equal and  reactive.  Extraocular movement intact.         Nose: Sinuses were non-tender.  Anterior rhinoscopy revealed midline septum and absence of purulence or polyps.     Oral Cavity: Normal tongue, floor of mouth, buccal mucosa, and palate.  No lesions or masses on inspection or palpation.     Oropharynx: Normal mucosa, palate symmetric with normal elevation. No abnormal lymph tissue in the oropharynx.             Neck: Supple with normal laryngeal and tracheal landmarks.  The parotid beds were without masses.  No palpable thyroid.  Normal range of motion   Lymphatic: There is no palpable lymphadenopathy in the neck.          Nasal endoscopy: Consent for nasal endoscopy was obtained.  I confirmed correctness of the procedure and identity of the patient.  Nasal endoscopy was indicated due to history of sinonasal carcinoma.  The nose was topically decongested and anesthetized.  The nasal endoscope was passed under endoscopic vision.  On the right side the septum is in the midline.  There is absence of the right middle and inferior turbinate.  There is a large sinonasal cavity that is well mucosalized.  There is absence of right sphenoethmoidal recess.  The sphenoid sinus is open.  I was able to advance the scope into the sphenoid sinus which is totally clean.  On the left side there is normal septum.  There is normal inferior meatus and inferior turbinate.  The left middle turbinate and middle meatus are within normal limits.  The left sphenoethmoidal recess is normal.  Today's nasal endoscopy did not reveal any evidence of local disease.    IMPRESSION AND PLAN: 69-year-old male with diagnosis of right squamous cell carcinoma of the sinonasal passages T3 N0 M0.  The patient is doing well.  Today's nasal endoscopy and examination did not reveal any evidence of local regional disease.  The plan is to bring the patient back in July 2025 with sinus MRI and a chest CT      Karina Kumar MD, M.S.  Otolaryngology-  Head & Neck Surgery  [phone]              Again, thank you for allowing me to participate in the care of your patient.      Sincerely,    Karina Kumar MD

## 2025-04-27 ENCOUNTER — HEALTH MAINTENANCE LETTER (OUTPATIENT)
Age: 70
End: 2025-04-27

## 2025-05-19 ENCOUNTER — PATIENT OUTREACH (OUTPATIENT)
Dept: CARE COORDINATION | Facility: CLINIC | Age: 70
End: 2025-05-19
Payer: COMMERCIAL

## 2025-07-03 ENCOUNTER — HOSPITAL ENCOUNTER (OUTPATIENT)
Dept: MRI IMAGING | Facility: CLINIC | Age: 70
End: 2025-07-03
Attending: OTOLARYNGOLOGY
Payer: COMMERCIAL

## 2025-07-03 DIAGNOSIS — C31.9 MALIGNANT NEOPLASM OF ACCESSORY SINUS (H): ICD-10-CM

## 2025-07-03 PROCEDURE — 70543 MRI ORBT/FAC/NCK W/O &W/DYE: CPT

## 2025-07-03 PROCEDURE — 255N000002 HC RX 255 OP 636: Performed by: OTOLARYNGOLOGY

## 2025-07-03 PROCEDURE — A9585 GADOBUTROL INJECTION: HCPCS | Performed by: OTOLARYNGOLOGY

## 2025-07-03 RX ORDER — GADOBUTROL 604.72 MG/ML
0.1 INJECTION INTRAVENOUS ONCE
Status: COMPLETED | OUTPATIENT
Start: 2025-07-03 | End: 2025-07-03

## 2025-07-03 RX ADMIN — GADOBUTROL 7.5 ML: 604.72 INJECTION INTRAVENOUS at 11:43

## 2025-07-08 ENCOUNTER — TELEPHONE (OUTPATIENT)
Dept: OTOLARYNGOLOGY | Facility: CLINIC | Age: 70
End: 2025-07-08

## 2025-07-08 ENCOUNTER — ORDERS ONLY (AUTO-RELEASED) (OUTPATIENT)
Dept: FAMILY MEDICINE | Facility: CLINIC | Age: 70
End: 2025-07-08

## 2025-07-08 ENCOUNTER — OFFICE VISIT (OUTPATIENT)
Dept: FAMILY MEDICINE | Facility: CLINIC | Age: 70
End: 2025-07-08
Attending: INTERNAL MEDICINE
Payer: COMMERCIAL

## 2025-07-08 VITALS
RESPIRATION RATE: 16 BRPM | DIASTOLIC BLOOD PRESSURE: 78 MMHG | HEART RATE: 64 BPM | TEMPERATURE: 96 F | WEIGHT: 156 LBS | BODY MASS INDEX: 24.48 KG/M2 | SYSTOLIC BLOOD PRESSURE: 130 MMHG | HEIGHT: 67 IN | OXYGEN SATURATION: 98 %

## 2025-07-08 DIAGNOSIS — E11.9 TYPE 2 DIABETES MELLITUS WITHOUT COMPLICATION, WITHOUT LONG-TERM CURRENT USE OF INSULIN (H): ICD-10-CM

## 2025-07-08 DIAGNOSIS — Z12.11 SCREEN FOR COLON CANCER: ICD-10-CM

## 2025-07-08 DIAGNOSIS — I10 PRIMARY HYPERTENSION: ICD-10-CM

## 2025-07-08 DIAGNOSIS — C31.9 MALIGNANT NEOPLASM OF PARANASAL SINUS (H): ICD-10-CM

## 2025-07-08 DIAGNOSIS — R31.29 MICROSCOPIC HEMATURIA: ICD-10-CM

## 2025-07-08 DIAGNOSIS — E78.5 DYSLIPIDEMIA: ICD-10-CM

## 2025-07-08 DIAGNOSIS — Z12.5 SCREENING FOR PROSTATE CANCER: ICD-10-CM

## 2025-07-08 DIAGNOSIS — R91.1 NODULE OF APEX OF LEFT LUNG: ICD-10-CM

## 2025-07-08 DIAGNOSIS — Z00.00 ROUTINE GENERAL MEDICAL EXAMINATION AT A HEALTH CARE FACILITY: Primary | ICD-10-CM

## 2025-07-08 LAB
ANION GAP SERPL CALCULATED.3IONS-SCNC: 12 MMOL/L (ref 7–15)
BUN SERPL-MCNC: 20.6 MG/DL (ref 8–23)
CALCIUM SERPL-MCNC: 9.2 MG/DL (ref 8.8–10.4)
CHLORIDE SERPL-SCNC: 102 MMOL/L (ref 98–107)
CHOLEST SERPL-MCNC: 119 MG/DL
CREAT SERPL-MCNC: 0.62 MG/DL (ref 0.67–1.17)
CREAT UR-MCNC: 94.9 MG/DL
EGFRCR SERPLBLD CKD-EPI 2021: >90 ML/MIN/1.73M2
FASTING STATUS PATIENT QL REPORTED: YES
FASTING STATUS PATIENT QL REPORTED: YES
GLUCOSE SERPL-MCNC: 130 MG/DL (ref 70–99)
HCO3 SERPL-SCNC: 23 MMOL/L (ref 22–29)
HDLC SERPL-MCNC: 50 MG/DL
LDLC SERPL CALC-MCNC: 45 MG/DL
MICROALBUMIN UR-MCNC: <12 MG/L
MICROALBUMIN/CREAT UR: NORMAL MG/G{CREAT}
NONHDLC SERPL-MCNC: 69 MG/DL
POTASSIUM SERPL-SCNC: 4.2 MMOL/L (ref 3.4–5.3)
SODIUM SERPL-SCNC: 137 MMOL/L (ref 135–145)
TRIGL SERPL-MCNC: 118 MG/DL

## 2025-07-08 PROCEDURE — 82043 UR ALBUMIN QUANTITATIVE: CPT | Performed by: INTERNAL MEDICINE

## 2025-07-08 PROCEDURE — 80061 LIPID PANEL: CPT | Performed by: INTERNAL MEDICINE

## 2025-07-08 PROCEDURE — 80048 BASIC METABOLIC PNL TOTAL CA: CPT | Performed by: INTERNAL MEDICINE

## 2025-07-08 PROCEDURE — 3078F DIAST BP <80 MM HG: CPT | Performed by: INTERNAL MEDICINE

## 2025-07-08 PROCEDURE — 99207 PR FOOT EXAM NO CHARGE: CPT | Performed by: INTERNAL MEDICINE

## 2025-07-08 PROCEDURE — 99214 OFFICE O/P EST MOD 30 MIN: CPT | Mod: 25 | Performed by: INTERNAL MEDICINE

## 2025-07-08 PROCEDURE — 36415 COLL VENOUS BLD VENIPUNCTURE: CPT | Performed by: INTERNAL MEDICINE

## 2025-07-08 PROCEDURE — 82570 ASSAY OF URINE CREATININE: CPT | Performed by: INTERNAL MEDICINE

## 2025-07-08 PROCEDURE — 99397 PER PM REEVAL EST PAT 65+ YR: CPT | Performed by: INTERNAL MEDICINE

## 2025-07-08 PROCEDURE — 3075F SYST BP GE 130 - 139MM HG: CPT | Performed by: INTERNAL MEDICINE

## 2025-07-08 RX ORDER — LISINOPRIL AND HYDROCHLOROTHIAZIDE 12.5; 2 MG/1; MG/1
1 TABLET ORAL DAILY
Qty: 90 TABLET | Refills: 3 | Status: SHIPPED | OUTPATIENT
Start: 2025-07-08

## 2025-07-08 RX ORDER — PRAVASTATIN SODIUM 40 MG
40 TABLET ORAL DAILY
Qty: 90 TABLET | Refills: 3 | Status: SHIPPED | OUTPATIENT
Start: 2025-07-08

## 2025-07-08 RX ORDER — METFORMIN HYDROCHLORIDE 500 MG/1
2000 TABLET, EXTENDED RELEASE ORAL DAILY
Qty: 360 TABLET | Refills: 3 | Status: SHIPPED | OUTPATIENT
Start: 2025-07-08

## 2025-07-08 RX ORDER — DAPAGLIFLOZIN 10 MG/1
10 TABLET, FILM COATED ORAL DAILY
Qty: 90 TABLET | Refills: 3 | Status: SHIPPED | OUTPATIENT
Start: 2025-07-08

## 2025-07-08 SDOH — HEALTH STABILITY: PHYSICAL HEALTH: ON AVERAGE, HOW MANY DAYS PER WEEK DO YOU ENGAGE IN MODERATE TO STRENUOUS EXERCISE (LIKE A BRISK WALK)?: 6 DAYS

## 2025-07-08 ASSESSMENT — SOCIAL DETERMINANTS OF HEALTH (SDOH): HOW OFTEN DO YOU GET TOGETHER WITH FRIENDS OR RELATIVES?: TWICE A WEEK

## 2025-07-08 NOTE — PROGRESS NOTES
Preventive Care Visit  [facility]  Aiden Drummond MD, Internal Medicine  Jul 8, 2025      Assessment & Plan   Problem List Items Addressed This Visit       Primary hypertension    Relevant Medications    lisinopril-hydrochlorothiazide (ZESTORETIC) 20-12.5 MG tablet    Other Relevant Orders    BASIC METABOLIC PANEL    Type 2 diabetes mellitus without complication, without long-term current use of insulin (H)    Uncomplicated  Stable on current regimen  Diabetes is improving/stable/worsening: unchanged.  Continue current treatment regimen. and Reminded to get yearly retinal exam.  Diabetes will be reassessed in 6 months.         Relevant Medications    dapagliflozin (FARXIGA) 10 MG TABS tablet    metFORMIN (GLUCOPHAGE XR) 500 MG 24 hr tablet    Other Relevant Orders    Lipid panel reflex to direct LDL Non-fasting    Albumin Random Urine Quantitative with Creat Ratio    FOOT EXAM (Completed)    Dyslipidemia    Controlled by pravastatin 40 mg daily         Relevant Medications    pravastatin (PRAVACHOL) 40 MG tablet    Microscopic hematuria    Negative CT and cystoscopy in '94 and '99         Malignant neoplasm of paranasal sinus (H)    7/7/25  -MRI 7/3/25  mpression: No evidence of local recurrence in the sinonasal cavity or  metastatic disease elsewhere in the head and neck within the  visualized field of view. Stable to slightly improved mildly enhancing  inflammatory mucosal thickening in the right maxillary sinus floor.  -Path: INVASIVE SQUAMOUS CELL CARCINOMA IN A BACKGROUND OF INVERTED SQUAMOUS PAPILLOMA   -Per Tumor conference  Tumor Site: Right sinus  Tumor Pathology: SCC  Tumor Stage: likely T3N0  -Surgery and radiation therapy completed in 2024         Nodule of apex of left lung    7/7/25  -CT 7/3/25  Impression: No evidence of local recurrence in the sinonasal cavity or  metastatic disease elsewhere in the head and neck within the  visualized field of view. Stable to slightly  improved mildly enhancing  inflammatory mucosal thickening in the right maxillary sinus floor.  -1.1 cm incidental nodule on PET/CT with uptake 12/29/23            Other Visit Diagnoses         Routine general medical examination at a health care facility    -  Primary      Screen for colon cancer        Relevant Orders    COLOGUARD(EXACT SCIENCES)      Screening for prostate cancer        Relevant Orders    PSA, screen                  Reviewed preventive health counseling, as reflected in patient instructions  Counseling  Appropriate preventive services were addressed with this patient via screening, questionnaire, or discussion as appropriate for fall prevention, nutrition, physical activity, Tobacco-use cessation, social engagement, weight loss and cognition.  Checklist reviewing preventive services available has been given to the patient.  Reviewed patient's diet, addressing concerns and/or questions.   The patient was provided with written information regarding signs of hearing loss.       Follow-up    Follow-up Visit   Expected date:  Jul 08, 2026 (Approximate)      Follow Up Appointment Details:     Follow-up with whom?: PCP    Follow-Up for what?: Adult Preventive    How?: In Person                 Serg Joseph is a 69 year old, presenting for the following:  Physical (Pt here for an Adult preventative visit and fasting labwork and Follow-up on CT results from 7/3), Diabetes, Hypertension, and Hip Pain (Pt c/o right hip pain that radiates into right leg  x 2 weeks)    No Hearing test performed.  Pt has hearing aids    Patient with history of diabetes, hypertension, dyslipidemia, and sinus surgery here for wellness visit.  Recent imaging of the sinus and chest favorable.  He has follow-up with oncology scheduled.  He has lost some taste and smell and has some numbness in the lip area since his treatment but no other associated complaints other than dryness of the sinuses.  No hypoglycemia or other  complaints on complete review of systems.      7/8/2025     9:15 AM   Additional Questions   Roomed by Yannick LEZAMA             Diabetes Follow-up    How often are you checking your blood sugar? Not at all  What concerns do you have today about your diabetes? None   Do you have any of these symptoms? (Select all that apply)  No numbness or tingling in feet.  No redness, sores or blisters on feet.  No complaints of excessive thirst.  No reports of blurry vision.  No significant changes to weight.  Have you had a diabetic eye exam in the last 12 months? No        BP Readings from Last 2 Encounters:   07/08/25 130/78   03/06/25 116/73     Hemoglobin A1C (%)   Date Value   06/17/2024 6.8 (H)   01/22/2024 7.2 (H)     LDL Cholesterol Calculated (mg/dL)   Date Value   06/17/2024 47   03/31/2023 40             Hypertension Follow-up    Do you check your blood pressure regularly outside of the clinic? No   Are you following a low salt diet? Yes  Are your blood pressures ever more than 140 on the top number (systolic) OR more   than 90 on the bottom number (diastolic), for example 140/90? No    BP Readings from Last 2 Encounters:   07/08/25 130/78   03/06/25 116/73       Advance Care Planning    Patient states has Health Care Directive and will send to [employer].        7/8/2025   General Health   How would you rate your overall physical health? (!) FAIR   Feel stress (tense, anxious, or unable to sleep) Only a little   (!) STRESS CONCERN      7/8/2025   Nutrition   Diet: Regular (no restrictions)         7/8/2025   Exercise   Days per week of moderate/strenous exercise 6 days         7/8/2025   Social Factors   Frequency of gathering with friends or relatives Twice a week   Worry food won't last until get money to buy more No   Food not last or not have enough money for food? No   Do you have housing? (Housing is defined as stable permanent housing and does not include staying outside in a car, in a tent, in an abandoned  building, in an overnight shelter, or couch-surfing.) Yes   Are you worried about losing your housing? No   Lack of transportation? No   Unable to get utilities (heat,electricity)? No         7/8/2025   Fall Risk   Fallen 2 or more times in the past year? No   Trouble with walking or balance? No          7/8/2025   Activities of Daily Living- Home Safety   Needs help with the following daily activites None of the above   Safety concerns in the home None of the above         7/8/2025   Dental   Dentist two times every year? Yes         7/8/2025   Hearing Screening   Hearing concerns? (!) TROUBLE UNDESTANDING A SPEAKER IN A PUBLIC MEETING OR [employer].   Would you like a referral for hearing testing? No         7/8/2025   Driving Risk Screening   Patient/family members have concerns about driving No         7/8/2025   General Alertness/Fatigue Screening   Have you been more tired than usual lately? No         7/8/2025   Urinary Incontinence Screening   Bothered by leaking urine in past 6 months No         Today's PHQ-2 Score:       7/8/2025     9:09 AM   PHQ-2 ( 1999 [employer])   Q1: Little interest or pleasure in doing things 0   Q2: Feeling down, depressed or hopeless 1   PHQ-2 Score 1    Q1: Little interest or pleasure in doing things Not at all   Q2: Feeling down, depressed or hopeless Several days   PHQ-2 Score 1       Patient-reported           7/8/2025   Substance Use   Alcohol more than 3/day or more than 7/wk No   Do you have a current opioid prescription? No   How severe/bad is pain from 1 to 10? 1/10   Do you use any other substances recreationally? No     Social History     Tobacco Use    Smoking status: Never     Passive exposure: Never    Smokeless tobacco: Never    Tobacco comments:     states he never has smokes   Vaping Use    Vaping status: Never Used   Substance Use Topics    Alcohol use: Yes     Alcohol/week: 6.0 standard drinks of alcohol     Types: 6 Cans of beer per week     Comment:  occasional beer    Drug use: Never           7/8/2025   AAA Screening   Family history of Abdominal Aortic Aneurysm (AAA)? No   Last PSA:   Prostate Specific Antigen Screen   Date Value Ref Range Status   03/31/2023 1.18 0.00 - 4.50 ng/mL Final   12/29/2021 1.16 < OR = 4.00 ng/mL Final     Comment:     The total PSA value from this assay system is   standardized against the WHO standard. The test   result will be approximately 20% lower when compared   to the equimolar-standardized total PSA (Mateo   Melanie). Comparison of serial PSA results should be   interpreted with this fact in mind.     This test was performed using the Siemens   chemiluminescent method. Values obtained from   different assay methods cannot be used  interchangeably. PSA levels, regardless of  value, should not be interpreted as absolute  evidence of the presence or absence of disease.  Lab test performed by:  [facility]  [address] [place] [address]  Leonides Seaman M.D.  QUEST Specimen received date and time: 30-DEC-2021 02:55:00.00       ASCVD Risk   The ASCVD Risk score (Ladarius DK, et al., 2019) failed to calculate for the following reasons:    The valid total cholesterol range is 130 to 320 mg/dL            Reviewed and updated as needed this visit by Provider                    Past Surgical History:   Procedure Laterality Date    COLOGUARD(EXACT SCIENCES)  12/10/2018    COLOGUARD(EXACT SCIENCES)  11/18/2015    CYSTOSCOPY  2000    OPTICAL TRACKING SYSTEM ENDOSCOPIC SINUS SURGERY N/A 1/26/2024    Procedure: Stealth guided transnasal approach to resect sinonasal tumor with Rivero-Chito approach.;  Surgeon: Karina Kumar MD;  Location:  OR     Current providers sharing in care for this patient include:  Patient Care Team:  Aiden Drummond MD as PCP - General (Internal Medicine)  Aiden Drummond MD as Assigned PCP  Karina Kumar MD as MD  "(Otolaryngology)  Karina Kumar MD as Assigned Surgical Provider  Johana Galeas MD as MD (Radiation Oncology)  Bernardino Singer MD as MD (Cardiovascular & Thoracic Surgery)  Johana Galeas MD as Assigned Cancer Care Provider  Bernardino Singer MD as Assigned Heart and Vascular Surgical Provider    The following health maintenance items are reviewed in Epic and correct as of today:  Health Maintenance   Topic Date Due    ZOSTER VACCINE (1 of 2) Never done    RSV VACCINE (1 - Risk 60-74 years 1-dose series) Never done    A1C  09/17/2024    COLORECTAL CANCER SCREENING  04/06/2025    COVID-19 VACCINE (8 - Moderna risk 2024-25 season) 06/07/2025    BMP  06/17/2025    LIPID  06/17/2025    MICROALBUMIN  06/17/2025    EYE EXAM  08/01/2025    INFLUENZA VACCINE (1) 09/01/2025    MEDICARE ANNUAL WELLNESS VISIT  07/08/2026    DIABETIC FOOT EXAM  07/08/2026    ANNUAL REVIEW OF HM ORDERS  07/08/2026    FALL RISK ASSESSMENT  07/08/2026    ADVANCE CARE PLANNING  07/08/2030    DTAP/TDAP/TD VACCINE (4 - Td or Tdap) 03/31/2033    HEPATITIS C SCREENING  Completed    PHQ-2 (once per calendar year)  Completed    PNEUMOCOCCAL VACCINE 50+ YEARS  Completed    HPV VACCINE  Aged Out    MENINGITIS VACCINE  Aged Out         Review of Systems  Constitutional, HEENT, cardiovascular, pulmonary, GI, , musculoskeletal, neuro, skin, endocrine and psych systems are negative, except as otherwise noted.     Objective    Exam  /78 (BP Location: Right arm, Patient Position: Sitting, Cuff Size: Adult Regular)   Pulse 64   Temp (!) 96  F (35.6  C) (Tympanic)   Resp 16   Ht 1.689 m (5' 6.5\")   Wt 70.8 kg (156 lb)   SpO2 98%   BMI 24.80 kg/m     Estimated body mass index is 24.8 kg/m  as calculated from the following:    Height as of this encounter: 1.689 m (5' 6.5\").    Weight as of this encounter: 70.8 kg (156 lb).    Physical Exam  Healthy-appearing and in no distress  Eyes appear normal  HEENT exam " unremarkable  Neck supple no adenopathy or thyromegaly  Lungs clear  Cardiac exam regular, no murmur, no edema, normal carotid and posterior tibial pulsations  Abdomen soft, nondistended, nontender  Alert, oriented, speech and cognition intact, cranial nerves normal, strength and gait normal  Normal mood and affect  Feet in good condition with intact sensation to monofilament and vibration .  DP pulses normal bilaterally          7/8/2025   Mini Cog   Mini-Cog Not Completed (choose reason) Patient declines             Signed Electronically by: Aiden Drummond MD

## 2025-07-08 NOTE — PATIENT INSTRUCTIONS
Patient Education   Preventive Care Advice   This is general advice given by our system to help you stay healthy. However, your care team may have specific advice just for you. Please talk to your care team about your preventive care needs.  Nutrition  Eat 5 or more servings of fruits and vegetables each day.  Try wheat bread, brown rice and whole grain pasta (instead of white bread, rice, and pasta).  Get enough calcium and vitamin D. Check the label on foods and aim for 100% of the RDA (recommended daily allowance).  Lifestyle  Exercise at least 150 minutes each week  (30 minutes a day, 5 days a week).  Do muscle strengthening activities 2 days a week. These help control your weight and prevent disease.  No smoking.  Wear sunscreen to prevent skin cancer.  Have a dental exam and cleaning every 6 months.  Yearly exams  See your health care team every year to talk about:  Any changes in your health.  Any medicines your care team has prescribed.  Preventive care, family planning, and ways to prevent chronic diseases.  Shots (vaccines)   HPV shots (up to age 26), if you've never had them before.  Hepatitis B shots (up to age 59), if you've never had them before.  COVID-19 shot: Get this shot when it's due.  Flu shot: Get a flu shot every year.  Tetanus shot: Get a tetanus shot every 10 years.  Pneumococcal, hepatitis A, and RSV shots: Ask your care team if you need these based on your risk.  Shingles shot (for age 50 and up)  General health tests  Diabetes screening:  Starting at age 35, Get screened for diabetes at least every 3 years.  If you are younger than age 35, ask your care team if you should be screened for diabetes.  Cholesterol test: At age 39, start having a cholesterol test every 5 years, or more often if advised.  Bone density scan (DEXA): At age 50, ask your care team if you should have this scan for osteoporosis (brittle bones).  Hepatitis C: Get tested at least once in your life.  STIs (sexually  transmitted infections)  Before age 24: Ask your care team if you should be screened for STIs.  After age 24: Get screened for STIs if you're at risk. You are at risk for STIs (including HIV) if:  You are sexually active with more than one person.  You don't use condoms every time.  You or a partner was diagnosed with a sexually transmitted infection.  If you are at risk for HIV, ask about PrEP medicine to prevent HIV.  Get tested for HIV at least once in your life, whether you are at risk for HIV or not.  Cancer screening tests  Cervical cancer screening: If you have a cervix, begin getting regular cervical cancer screening tests starting at age 21.  Breast cancer scan (mammogram): If you've ever had breasts, begin having regular mammograms starting at age 40. This is a scan to check for breast cancer.  Colon cancer screening: It is important to start screening for colon cancer at age 45.  Have a colonoscopy test every 10 years (or more often if you're at risk) Or, ask your provider about stool tests like a FIT test every year or Cologuard test every 3 years.  To learn more about your testing options, visit:   .  For help making a decision, visit:   https://bit.ly/gs09970.  Prostate cancer screening test: If you have a prostate, ask your care team if a prostate cancer screening test (PSA) at age 55 is right for you.  Lung cancer screening: If you are a current or former smoker ages 50 to 80, ask your care team if ongoing lung cancer screenings are right for you.  For informational purposes only. Not to replace the advice of your health care provider. Copyright   2023 [facility]. All rights reserved. Clinically reviewed by the [facility]. [facility] 122197 - REV 01/24.  Hearing Loss: Care Instructions  Overview     Hearing loss is a sudden or slow decrease in how well you hear. It can range from slight to profound. Permanent hearing loss can occur with aging. It also can  happen when you are exposed long-term to loud noise. Examples include listening to loud music, riding motorcycles, or being around other loud machines.  Hearing loss can affect your work and home life. It can make you feel lonely or depressed. You may feel that you have lost your independence. But hearing aids and other devices can help you hear better and feel connected to others.  Follow-up care is a key part of your treatment and safety. Be sure to make and go to all appointments, and call your doctor if you are having problems. It's also a good idea to know your test results and keep a list of the medicines you take.  How can you care for yourself at home?  Avoid loud noises whenever possible. This helps keep your hearing from getting worse.  Always wear hearing protection around loud noises.  Wear a hearing aid as directed.  A professional can help you pick a hearing aid that will work best for you.  You can also get hearing aids over the counter for mild to moderate hearing loss.  Have hearing tests as your doctor suggests. They can show whether your hearing has changed. Your hearing aid may need to be adjusted.  Use other devices as needed. These may include:  Telephone amplifiers and hearing aids that can connect to a television, stereo, radio, or microphone.  Devices that use lights or vibrations. These alert you to the doorbell, a ringing telephone, or a baby monitor.  Television closed-captioning. This shows the words at the bottom of the screen. Most new TVs can do this.  TTY (text telephone). This lets you type messages back and forth on the telephone instead of talking or listening. These devices are also called TDD. When messages are typed on the keyboard, they are sent over the phone line to a receiving TTY. The message is shown on a monitor.  Use text messaging, social media, and email if it is hard for you to communicate by telephone.  Try to learn a listening technique called speechreading. It is  "not lipreading. You pay attention to people's gestures, expressions, posture, and tone of voice. These clues can help you understand what a person is saying. Face the person you are talking to, and have them face you. Make sure the lighting is good. You need to see the other person's face clearly.  Think about counseling if you need help to adjust to your hearing loss.  When should you call for help?  Watch closely for changes in your health, and be sure to contact your doctor if:    You think your hearing is getting worse.     You have new symptoms, such as dizziness or nausea.   Where can you learn more?  Go to https://www.[employer].net/patiented  Enter R798 in the search box to learn more about \"Hearing Loss: Care Instructions.\"  Current as of: October 27, 2024  Content Version: 14.5    5670-7987 [employer] [facility] [facility].   Care instructions adapted under license by your healthcare professional. If you have questions about a medical condition or this instruction, always ask your healthcare professional. [employer] [facility] [employer] disclaims any warranty or liability for your use of this information.       "

## 2025-07-08 NOTE — ASSESSMENT & PLAN NOTE
Uncomplicated  Stable on current regimen  Diabetes is improving/stable/worsening: unchanged.  Continue current treatment regimen. and Reminded to get yearly retinal exam.  Diabetes will be reassessed in 6 months.

## 2025-07-08 NOTE — TELEPHONE ENCOUNTER
Imaging results released for patient to view in [facility]. Patient scheduled for follow up appointment with Dr. Cole on 7/10/25.     Laura Hui, RN, BSN  RN Care Coordinator, ENT Clinic

## 2025-07-08 NOTE — ASSESSMENT & PLAN NOTE
7/7/25  -CT 7/3/25  Impression: No evidence of local recurrence in the sinonasal cavity or  metastatic disease elsewhere in the head and neck within the  visualized field of view. Stable to slightly improved mildly enhancing  inflammatory mucosal thickening in the right maxillary sinus floor.  -1.1 cm incidental nodule on PET/CT with uptake 12/29/23

## 2025-07-08 NOTE — ASSESSMENT & PLAN NOTE
7/7/25  -MRI 7/3/25  mpression: No evidence of local recurrence in the sinonasal cavity or  metastatic disease elsewhere in the head and neck within the  visualized field of view. Stable to slightly improved mildly enhancing  inflammatory mucosal thickening in the right maxillary sinus floor.  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

## 2025-07-08 NOTE — TELEPHONE ENCOUNTER
[facility] Call Center    Phone Message    May a detailed message be left on voicemail: yes     Reason for Call: Other: Pt isn't able to see his imaging results or report in his mychart and would like to see them so he's prepared for his appt. Please review and contact pt     Action Taken: Other: [facility]    Travel Screening: Not Applicable     Date of Service:

## 2025-07-09 NOTE — PROGRESS NOTES
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   Completed radiation therapy on : April 19, 2024        Path Result 01/26/2024  Final Diagnosis   A.SINUS CONTENTS, ETHMOID AND RIGHT INFERIOR TURBINATE:  - Benign respiratory mucosa and turbinate with non-specific chronic inflammation and fibrosis  - No evidence of high grade dysplasia/carcinoma     B. RIGHT SPHENOID SINUS, BIOPSY:  - Negative for high grade dysplasia/carcinoma     C. RIGHT ETHMOID, BIOPSY:  - Negative for high grade dysplasia/carcinoma     D. SINUS CONTENTS, RIGHT POSTERIOR ETHMOID:  - Fragments of inverted squamous papilloma  - Negative for high grade dysplasia/carcinoma     E. SINUS CONTENTS, RIGHT ANTERIOR ETHMOID:  - Fragments of inverted squamous papilloma  - No evidence of high grade dysplasia/carcinoma     F. RIGHT INFRA ORBITAL NERVE, BIOPSY:  - Nerve and bone tissue, negative for carcinoma     G. MAXILLARY SINUS, RIGHT, SIMPLE EXCISION:  - SQUAMOUS CELL CARCINOMA, FOCALLY INVASIVE in a background of granulation tissue and fibrosis  - No perineural or lymphovascular invasion is seen  - Peripheral and deep resection margins are negative  - AJCC Pathologic Stage: pT1     H. ORBITAL FLOOR BONE, BIOPSY:  - Negative for carcinoma     I. SINUS CONTENT, RIGHT ETHMOID AND TURBINATE:  - Fragment of inverted squamous papilloma  - Negative for high grade dysplasia/carcinoma     J. RIGHT MAXILLARY SINUS, POSTERIOR BONY WALL:  - Negative for carcinoma        History of Present Illness: 69-year-old male.  The patient is here for tumor surveillance follow-up.  He just had scans at the beginning of July.  The patient states that he is not having any particular symptoms related to his sinuses.  He continues to have sinonasal crusting.  He is managing this very well.  Continues to have some paresthesias on the right upper lip.   These paresthesias have no change.  He denies any pain.    MEDICATIONS:     Current Outpatient Medications   Medication Sig Dispense Refill    dapagliflozin (FARXIGA) 10 MG TABS tablet Take 1 tablet (10 mg) by mouth daily. 90 tablet 3    lisinopril-hydrochlorothiazide (ZESTORETIC) 20-12.5 MG tablet Take 1 tablet by mouth daily. 90 tablet 3    metFORMIN (GLUCOPHAGE XR) 500 MG 24 hr tablet Take 4 tablets (2,000 mg) by mouth daily. 360 tablet 3    pravastatin (PRAVACHOL) 40 MG tablet Take 1 tablet (40 mg) by mouth daily. 90 tablet 3       ALLERGIES:  No Known Allergies      PAST MEDICAL HISTORY:   Past Medical History:   Diagnosis Date    Hearing aid worn     Unspecified essential hypertension     Essential hypertension        FAMILY HISTORY:    Family History   Problem Relation Age of Onset    Hypertension Father     Lung Cancer Father     Diabetes Type 2  Father     Diabetes No family hx of     Eye Disorder No family hx of        REVIEW OF SYSTEMS:  12 point ROS was negative other than the symptoms noted above in the HPI.    PHYSICAL EXAMINATION:      Constitutional:  The patient was unaccompanied, well-groomed, and in no acute distress.     Skin: Normal:  warm and pink without rash   Neurologic: Alert and oriented x 3.  CN's III-XII within normal limits.  Voice normal.    Psychiatric: The patient's affect was calm, cooperative, and appropriate.     Communication:  Normal; communicates verbally, normal voice quality.   Respiratory: Breathing comfortably without stridor or exertion of accessory muscles.    Head/Face:  Normocephalic and atraumatic.  No lesions or scars. No sinus tenderness.    Salivary glands -  Normal size, no tenderness, swelling, or palpable masses   Eyes: Pupils were equal and reactive.  Extraocular movement intact.         Nose: Sinuses were non-tender.  Anterior rhinoscopy revealed midline septum and absence of purulence or polyps.     Oral Cavity: Normal tongue, floor of mouth, buccal mucosa,  and palate.  No lesions or masses on inspection or palpation.     Oropharynx: Normal mucosa, palate symmetric with normal elevation. No abnormal lymph tissue in the oropharynx.            Neck: Supple with normal laryngeal and tracheal landmarks.  The parotid beds were without masses.  No palpable thyroid.  Normal range of motion   Lymphatic: There is no palpable lymphadenopathy in the neck.             Nasal endoscopy: Consent for nasal endoscopy was obtained.  I confirmed correctness of the procedure and identity of the patient.  Nasal endoscopy was indicated due to history of right sinonasal malignancy.  The nose was topically decongested and anesthetized.  The nasal endoscope was passed under endoscopic vision.  On the right side there is absence of middle turbinate.  The inferior turbinate is present middle meatus is normal.  There is a large maxillary antrostomy.  The sinonasal mucosa is very well mucosalized there is evidence of right anterior posterior ethmoidectomy.  There is a large sphenoidotomy.  There is no evidence of masses or lesions that are concerning for recurrence.  On the left side the septum is in the midline.  Inferior middle turbinate are within normal limits.  Inferior and middle meatus are normal.  The nasopharynx is normal.     MRI sinus 7/3/2025: Impression: No evidence of local recurrence in the sinonasal cavity or  metastatic disease elsewhere in the head and neck within the  visualized field of view. Stable to slightly improved mildly enhancing  inflammatory mucosal thickening in the right maxillary sinus floor.    CT Chest 7/3/2025. 1. Redemonstrated left upper lobe nodule, unchanged from 2023. No  evidence for metastatic disease within the chest or upper abdomen.  2. 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       IMPRESSION AND PLAN: 69-year-old male he is now 15 months after completion of treatment for his right sinonasal malignancy.  The patient is  doing well without any new symptoms.  His MRI sinus and CT chest from July 3, 2025 shows no evidence of local regional or distant disease.  The plan is to see him back in 6 months for another clinical follow-up.  We are planning also to scan him in a year.      Karina Kumar MD, M.S.  Otolaryngology- Head & Neck Surgery  [phone]

## 2025-07-10 ENCOUNTER — OFFICE VISIT (OUTPATIENT)
Dept: OTOLARYNGOLOGY | Facility: CLINIC | Age: 70
End: 2025-07-10
Payer: COMMERCIAL

## 2025-07-10 VITALS
SYSTOLIC BLOOD PRESSURE: 124 MMHG | WEIGHT: 156 LBS | BODY MASS INDEX: 24.48 KG/M2 | HEIGHT: 67 IN | HEART RATE: 99 BPM | OXYGEN SATURATION: 97 % | DIASTOLIC BLOOD PRESSURE: 81 MMHG

## 2025-07-10 DIAGNOSIS — C31.9 MALIGNANT NEOPLASM OF ACCESSORY SINUS (H): Primary | ICD-10-CM

## 2025-07-10 ASSESSMENT — PAIN SCALES - GENERAL: PAINLEVEL_OUTOF10: MODERATE PAIN (4)

## 2025-07-10 NOTE — PATIENT INSTRUCTIONS
You were seen in the ENT Clinic today by Dr. Kumar. If you have any questions or concerns after your appointment, please contact us (see below)    Please return to clinic in 6 months for follow up with Dr. Cole     How to Contact Us:  Send a ViaCLIX message to your provider. Our team will respond to you via ViaCLIX. Occasionally, we will need to call you to get further information.  For urgent matters (Monday-Friday), call the ENT Clinic: [phone] and speak with a call center team member - they will route your call appropriately.   If you'd like to speak directly with a nurse, please find our contact information below. We do our best to check voicemail frequently throughout the day, and will work to call you back within 1-2 days. For urgent matters, please use the general clinic phone numbers listed above.    Laura BAXTER RN, BSN  RN Care Coordinator, ENT Clinic  [facility]  Direct: [phone]

## 2025-07-10 NOTE — LETTER
7/10/2025       RE: Opal Benitez  [address] [address]     Dear Colleague,    Thank you for referring your patient, Opal Benitez, to the [facility] [place] at [facility]. Please see a copy of my visit note below.    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   Completed radiation therapy on : April 19, 2024        Path Result 01/26/2024  Final Diagnosis   A.SINUS CONTENTS, ETHMOID AND RIGHT INFERIOR TURBINATE:  - Benign respiratory mucosa and turbinate with non-specific chronic inflammation and fibrosis  - No evidence of high grade dysplasia/carcinoma     B. RIGHT SPHENOID SINUS, BIOPSY:  - Negative for high grade dysplasia/carcinoma     C. RIGHT ETHMOID, BIOPSY:  - Negative for high grade dysplasia/carcinoma     D. SINUS CONTENTS, RIGHT POSTERIOR ETHMOID:  - Fragments of inverted squamous papilloma  - Negative for high grade dysplasia/carcinoma     E. SINUS CONTENTS, RIGHT ANTERIOR ETHMOID:  - Fragments of inverted squamous papilloma  - No evidence of high grade dysplasia/carcinoma     F. RIGHT INFRA ORBITAL NERVE, BIOPSY:  - Nerve and bone tissue, negative for carcinoma     G. MAXILLARY SINUS, RIGHT, SIMPLE EXCISION:  - SQUAMOUS CELL CARCINOMA, FOCALLY INVASIVE in a background of granulation tissue and fibrosis  - No perineural or lymphovascular invasion is seen  - Peripheral and deep resection margins are negative  - AJCC Pathologic Stage: pT1     H. ORBITAL FLOOR BONE, BIOPSY:  - Negative for carcinoma     I. SINUS CONTENT, RIGHT ETHMOID AND TURBINATE:  - Fragment of inverted squamous papilloma  - Negative for high grade dysplasia/carcinoma     J. RIGHT MAXILLARY SINUS, POSTERIOR BONY WALL:  - Negative for carcinoma        History of Present Illness: 69-year-old male.  The  patient is here for tumor surveillance follow-up.  He just had scans at the beginning of July.  The patient states that he is not having any particular symptoms related to his sinuses.  He continues to have sinonasal crusting.  He is managing this very well.  Continues to have some paresthesias on the right upper lip.  These paresthesias have no change.  He denies any pain.    MEDICATIONS:     Current Outpatient Medications   Medication Sig Dispense Refill     dapagliflozin (FARXIGA) 10 MG TABS tablet Take 1 tablet (10 mg) by mouth daily. 90 tablet 3     lisinopril-hydrochlorothiazide (ZESTORETIC) 20-12.5 MG tablet Take 1 tablet by mouth daily. 90 tablet 3     metFORMIN (GLUCOPHAGE XR) 500 MG 24 hr tablet Take 4 tablets (2,000 mg) by mouth daily. 360 tablet 3     pravastatin (PRAVACHOL) 40 MG tablet Take 1 tablet (40 mg) by mouth daily. 90 tablet 3       ALLERGIES:  No Known Allergies      PAST MEDICAL HISTORY:   Past Medical History:   Diagnosis Date     Hearing aid worn      Unspecified essential hypertension     Essential hypertension        FAMILY HISTORY:    Family History   Problem Relation Age of Onset     Hypertension Father      Lung Cancer Father      Diabetes Type 2  Father      Diabetes No family hx of      Eye Disorder No family hx of        REVIEW OF SYSTEMS:  12 point ROS was negative other than the symptoms noted above in the HPI.    PHYSICAL EXAMINATION:      Constitutional:  The patient was unaccompanied, well-groomed, and in no acute distress.     Skin: Normal:  warm and pink without rash   Neurologic: Alert and oriented x 3.  CN's III-XII within normal limits.  Voice normal.    Psychiatric: The patient's affect was calm, cooperative, and appropriate.     Communication:  Normal; communicates verbally, normal voice quality.   Respiratory: Breathing comfortably without stridor or exertion of accessory muscles.    Head/Face:  Normocephalic and atraumatic.  No lesions or scars. No sinus tenderness.     Salivary glands -  Normal size, no tenderness, swelling, or palpable masses   Eyes: Pupils were equal and reactive.  Extraocular movement intact.         Nose: Sinuses were non-tender.  Anterior rhinoscopy revealed midline septum and absence of purulence or polyps.     Oral Cavity: Normal tongue, floor of mouth, buccal mucosa, and palate.  No lesions or masses on inspection or palpation.     Oropharynx: Normal mucosa, palate symmetric with normal elevation. No abnormal lymph tissue in the oropharynx.            Neck: Supple with normal laryngeal and tracheal landmarks.  The parotid beds were without masses.  No palpable thyroid.  Normal range of motion   Lymphatic: There is no palpable lymphadenopathy in the neck.             Nasal endoscopy: Consent for nasal endoscopy was obtained.  I confirmed correctness of the procedure and identity of the patient.  Nasal endoscopy was indicated due to history of right sinonasal malignancy.  The nose was topically decongested and anesthetized.  The nasal endoscope was passed under endoscopic vision.  On the right side there is absence of middle turbinate.  The inferior turbinate is present middle meatus is normal.  There is a large maxillary antrostomy.  The sinonasal mucosa is very well mucosalized there is evidence of right anterior posterior ethmoidectomy.  There is a large sphenoidotomy.  There is no evidence of masses or lesions that are concerning for recurrence.  On the left side the septum is in the midline.  Inferior middle turbinate are within normal limits.  Inferior and middle meatus are normal.  The nasopharynx is normal.     MRI sinus 7/3/2025: Impression: No evidence of local recurrence in the sinonasal cavity or  metastatic disease elsewhere in the head and neck within the  visualized field of view. Stable to slightly improved mildly enhancing  inflammatory mucosal thickening in the right maxillary sinus floor.    CT Chest 7/3/2025. 1. Redemonstrated left  upper lobe nodule, unchanged from 2023. No  evidence for metastatic disease within the chest or upper abdomen.  2. 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       IMPRESSION AND PLAN: 69-year-old male he is now 15 months after completion of treatment for his right sinonasal malignancy.  The patient is doing well without any new symptoms.  His MRI sinus and CT chest from July 3, 2025 shows no evidence of local regional or distant disease.  The plan is to see him back in 6 months for another clinical follow-up.  We are planning also to scan him in a year.      Karina Kumar MD, M.S.  Otolaryngology- Head & Neck Surgery  [phone]         Again, thank you for allowing me to participate in the care of your patient.      Sincerely,    Karina Kumar MD

## 2025-07-14 ENCOUNTER — LAB (OUTPATIENT)
Dept: LAB | Facility: CLINIC | Age: 70
End: 2025-07-14
Payer: COMMERCIAL

## 2025-07-14 DIAGNOSIS — Z12.5 SCREENING FOR PROSTATE CANCER: ICD-10-CM

## 2025-07-14 DIAGNOSIS — E11.9 TYPE 2 DIABETES MELLITUS WITHOUT COMPLICATION, WITHOUT LONG-TERM CURRENT USE OF INSULIN (H): ICD-10-CM

## 2025-07-14 LAB
EST. AVERAGE GLUCOSE BLD GHB EST-MCNC: 151 MG/DL
HBA1C MFR BLD: 6.9 % (ref 0–5.6)
PSA SERPL DL<=0.01 NG/ML-MCNC: 1.06 NG/ML (ref 0–4.5)

## 2025-07-14 PROCEDURE — 36415 COLL VENOUS BLD VENIPUNCTURE: CPT

## 2025-07-14 PROCEDURE — G0103 PSA SCREENING: HCPCS

## 2025-07-14 PROCEDURE — 83036 HEMOGLOBIN GLYCOSYLATED A1C: CPT

## 2025-07-16 ENCOUNTER — OFFICE VISIT (OUTPATIENT)
Dept: RADIATION ONCOLOGY | Facility: CLINIC | Age: 70
End: 2025-07-16
Attending: RADIOLOGY
Payer: COMMERCIAL

## 2025-07-16 VITALS
BODY MASS INDEX: 25.12 KG/M2 | WEIGHT: 158 LBS | HEART RATE: 78 BPM | DIASTOLIC BLOOD PRESSURE: 83 MMHG | SYSTOLIC BLOOD PRESSURE: 128 MMHG

## 2025-08-04 NOTE — PROGRESS NOTES
***RADIATION ONCOLOGY FOLLOW-UP NOTE  Date of Visit: 2025    Patient Name: Opal Benitez  MRN: [id number]  : 1955    DISEASE TREATED: Right sinonasal squamous cell carcinoma, [id number], pT1N0     RADIATION THERAPY DELIVERED:   6000 cGy in 30 fractions to the paranasal sinus and 5400 cGy in 30 fractions to the right neck between 3/11/2024 and 2024 with IMRT.    INTERVAL SINCE COMPLETION OF RADIATION THERAPY: 9 months    SUBJECTIVE:   Opal Benitez is a 69 year old male with squamous cell carcinoma of the sinus who presented with nasal congestion.  He underwent right maxillary antrostomy, right total ethmoidectomy and right sphenoidotomy on 10/26/2023.  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  NATHAN staining was negative.    He saw Dr. Kumar on 2023 at which time nasal endoscopy demonstrated some right-sided granulation tissue/papilloma tissue in the area of the anterior and posterior ethmoids.     PET CT scan on 2023 demonstrated a peripheral soft tissue density within the right maxillary sinus with focal hypermetabolism at the superior aspect of the antrostomy along with the superior medial aspect of the right maxillary sinus.  There was mild FDG uptake in the left level IIB lymph node.  On the whole-body PET CT scan, there was a 1.1 cm left apical subpleural nodule with mild FDG uptake.    MRI of the paranasal sinuses on 2024 demonstrated postsurgical changes with residual T1 hyperintense T2 intermediate signal and postcontrast heterogeneously enhancing tissue in the right lower posterior ethmoid air cells extending to involve the superior right maxillary sinus along the inferior and medial right orbital wall.  In total the residual mass measured 4.1 x 1.2 cm.  There was destruction of the posterior aspects of the medial and inferior orbital wall and the osseous invasion included the infraorbital foramen with  invasion of the right infraorbital nerve.  There was abutment of the right inferior and medial rectus muscles.    His case was discussed in multidisciplinary tumor conference with recommendation for biopsy of the subpleural nodule.  He did meet with a thoracic surgeon, Dr. Singer, but opted to observe the nodule instead of biopsy.    He underwent radiation as described above.  Overall, he tolerated treatment well.  He experienced anorexia with minimal weight loss.  He did not require a feeding tube.    He underwent MRI of the paranasal sinuses on 7/17/2024 which showed postsurgical and postradiation changes in the right sinonasal region.  They reported a heterogeneous enhancing signal changes along the right maxillary sinus extending through the bony defect into the right retromolar maxillary fat pad in the pterygopalatine fossa.  PET CT scan on the same day showed no FDG uptake in this area and changes were felt to be most likely due to the postsurgical changes.  PET CT scan on 7/17/2024 showed no cervical lymphadenopathy and postoperative/postradiation changes in the sinonasal region and mucosal spaces with no evidence of recurrent tumor.  The left apical lung nodule first seen on 12/29/2023 was considered stable.    With regards to his pulmonary nodule, he is under the care of Dr. Singer who is following him conservatively with plans for surgery if there is enlargement of the nodule.    PET CT scan on 11/4/2024 showed stable postoperative and postradiation changes in the sinonasal region and mucosal spaces with persistent mild/moderately avid mucosal thickening of the right maxillary sinus likely representing an inflammatory process with continued attention on follow-up.  There was no cervical lymphadenopathy.  There was no evidence of distant metastatic disease.  There was no change in the left apical lung nodule dating back to 12/29/2023.  Metabolic activity is less than prior and now similar to  background.    He saw Dr. Kumar on 12/12/2024 at which time nasal endoscopy showed no evidence of masses or lesions.    Today, he is feeling well.  He has minimal nasal stuffiness.  He still has some fatigue but was able to take his trip to [place] after radiation.  While he did enjoy his trip, he did have decreased stamina and continues to struggle with residual fatigue.    He is eating food without difficulty and has mostly had return of taste although xerostomia persists.  He is not having any difficulty with vision or hearing.  He is back to teaching full-time.    PAST MEDICAL/SURGICAL HISTORY:   Past Medical History:   Diagnosis Date    Hearing aid worn     Unspecified essential hypertension     Essential hypertension      Past Surgical History:   Procedure Laterality Date    COLOGUARD(EXACT SCIENCES)  12/10/2018    COLOGUARD(EXACT SCIENCES)  11/18/2015    CYSTOSCOPY  2000    OPTICAL TRACKING SYSTEM ENDOSCOPIC SINUS SURGERY N/A 1/26/2024    Procedure: Stealth guided transnasal approach to resect sinonasal tumor with Rivero-Ch[place] approach.;  Surgeon: Karina Kumar MD;  Location:  OR     ALLERGIES:  No Known Allergies    MEDICATIONS:   Current Outpatient Medications   Medication Sig Dispense Refill    dapagliflozin (FARXIGA) 10 MG TABS tablet Take 1 tablet (10 mg) by mouth daily. 90 tablet 3    lisinopril-hydrochlorothiazide (ZESTORETIC) 20-12.5 MG tablet Take 1 tablet by mouth daily. 90 tablet 3    metFORMIN (GLUCOPHAGE XR) 500 MG 24 hr tablet Take 4 tablets (2,000 mg) by mouth daily. 360 tablet 3    pravastatin (PRAVACHOL) 40 MG tablet Take 1 tablet (40 mg) by mouth daily. 90 tablet 3     REVIEW OF SYSTEMS: A 12-point review of systems was obtained. Pertinent findings are noted in the HPI and are otherwise unremarkable.     PHYSICAL EXAM:  VITALS: /83   Pulse 78   Wt 71.7 kg (158 lb)   BMI 25.12 kg/m  , initial weight prior to starting radiation 74.4 kg (164 pounds)  GEN:  Alert, oriented, in no acute distress  HEENT: Normocephalic, no sinus tenderness, oral cavity without mucosal lesions, no thrush, foreshortened uvula, nasal cavity without crusting or purulence  Neck: No palpable adenopathy, no lymphedema or if it is present, it is minimally apparent at this time.  Full range of motion  Respiratory: Breathing comfortably on room air, no increased work of breathing or wheezing  Cardiovascular: Regular rate, extremities warm, well-perfused  Skin: No residual erythema in the area of radiation treatment field, minimal hyperpigmentation  Neuro: Cranial nerves II through XII are grossly intact, gait normal    LABS AND IMAGING:  IMAGING: Please refer to history of present illness for discussion of patient's follow-up MRI paranasal sinus and PET CT scan.  I have personally reviewed these images and agree with findings as stated.    Lab Results   Component Value Date    TSH 0.78 01/29/2025         IMPRESSION/RECOMMENDATION:  Opal Benitez is a 69 year old male with a right sinonasal squamous cell carcinoma, [id number], pT1N0. 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  NATHAN staining was negative.  He underwent adjuvant radiation as described above.  He is now 9 months post completion of adjuvant radiation with restaging scans showing no evidence of malignancy in the region of the paranasal sinuses, neck.  Most recent PET CT scan shows no evidence of distant metastatic disease.  Notably, the pulmonary nodule has stabilized and is no longer PET avid.    He will continue with frequent follow-up for nasal endoscopy with Dr. Douglas Vasquez.    He is scheduled for repeat imaging with MRI of the paranasal sinuses and chest CT scan on 7/3/2025.    We discussed options for treatment of xerostomia including Evoxac.  Currently, the patient wishes to wait  to see if he experiences further recovery of salivary function prior to taking any medications.    I have asked him to return for follow-up in another 6 months for continued monitoring of radiation side effects.  He can coordinate this appointment with one to see Dr. Douglas Vasquez.    We appreciate the opportunity to participate in Mr. Opal Benitez's care.  Please call with any questions.    50 minutes spent by me on the date of the encounter doing chart review, history and exam, documentation and further activities per the note.    Johana Galeas MD  Radiation Oncology    CC:  Patient Care Team:  Aiden Drummond MD as PCP - General (Internal Medicine)  Aiden Drummond MD as Assigned PCP  Karina Kumar MD as MD (Otolaryngology)  Karina Kumar MD as Assigned Surgical Provider  Johana Galeas MD as MD (Radiation Oncology)  Bernardino Singer MD as MD (Cardiovascular & Thoracic Surgery)  Johana Galeas MD as Assigned Cancer Care Provider  Bernardino Singer MD as Assigned Heart and Vascular Surgical Provider     This note was dictated with voice recognition software and then edited. Please excuse any unintentional errors.

## (undated) DEVICE — ESU SUCTION CAUTERY 10FR FOOT CONTROL E2505-10FR

## (undated) DEVICE — BLADE SHAVER SERRATED 4MM ROTATE 1884002HRE

## (undated) DEVICE — SPONGE COTTONOID NEURO 1/2"X1/2" 30-054

## (undated) DEVICE — SOL NACL 0.9% IRRIG 1000ML BOTTLE 2F7124

## (undated) DEVICE — DRAPE POUCH INSTRUMENT 1018

## (undated) DEVICE — SUCTION MANIFOLD NEPTUNE 2 SYS 4 PORT 0702-020-000

## (undated) DEVICE — SU VICRYL 4-0 FS-2 27" J422-H

## (undated) DEVICE — DRSG TELFA 3X8" 1238

## (undated) DEVICE — TRACKER PATIENT NON-INVASIVE AXIEM 9734887

## (undated) DEVICE — SYRINGE EAR/ULCER STERILE 2 OZ BULB 4172

## (undated) DEVICE — ENDO SHEATH STORZ SHARPSITE ENDOSCRUB 30DEG 4MM 1912010

## (undated) DEVICE — Device

## (undated) DEVICE — DRAPE SHEET REV FOLD 3/4 9349

## (undated) DEVICE — PACK NEURO MINOR UMMC SNE32MNMU4

## (undated) DEVICE — SU CHROMIC 4-0 SH 27" G121H

## (undated) DEVICE — SOL HYDROGEN PEROXIDE 3% 4OZ BOTTLE F0010

## (undated) DEVICE — LINEN TOWEL PACK X6 WHITE 5487

## (undated) DEVICE — ENDO SHEATH STORZ SHARPSITE ENDOSCRUB 0DEG 4MM 1912000

## (undated) DEVICE — EYE PREP BETADINE 5% SOLUTION 30ML 0065-0411-30

## (undated) DEVICE — ESU GROUND PAD ADULT W/CORD E7507

## (undated) DEVICE — SURGICEL HEMOSTAT 4X8" 1952

## (undated) DEVICE — SOL WATER IRRIG 1000ML BOTTLE 2F7114

## (undated) DEVICE — CATH TRAY FOLEY SURESTEP 16FR W/TMP PRB STLK LATEX A319416AM

## (undated) DEVICE — ESU MINI EPIDURAL VEIN SEALER AQUAMANTIS 3.4MM 23-314-1

## (undated) DEVICE — ESU ELEC NDL 6" COATED/INSULATED E1465-6

## (undated) DEVICE — SYR 03ML LL W/O NDL 309657

## (undated) DEVICE — TUBING SUCTION MEDI-VAC SOFT 3/16"X20' N520A

## (undated) DEVICE — TUBING STRYKER IRRIGATION CASSETTE 5400-050-001

## (undated) DEVICE — SPONGE SURGIFOAM 100 1974

## (undated) DEVICE — SOL NACL 0.9% INJ 500ML BAG 07983-03

## (undated) DEVICE — STRAP UNIVERSAL POSITIONING 2-PIECE 4X47X76" 91-287

## (undated) DEVICE — TRACKER ENT OTS INSTRUMENT FUSION 9733533

## (undated) DEVICE — LINEN TOWEL PACK X30 5481

## (undated) DEVICE — SPONGE COTTONOID 1/2X3" 80-1407

## (undated) RX ORDER — FENTANYL CITRATE-0.9 % NACL/PF 10 MCG/ML
PLASTIC BAG, INJECTION (ML) INTRAVENOUS
Status: DISPENSED
Start: 2024-01-26

## (undated) RX ORDER — FENTANYL CITRATE 50 UG/ML
INJECTION, SOLUTION INTRAMUSCULAR; INTRAVENOUS
Status: DISPENSED
Start: 2024-01-26

## (undated) RX ORDER — CEFTRIAXONE 2 G/1
INJECTION, POWDER, FOR SOLUTION INTRAMUSCULAR; INTRAVENOUS
Status: DISPENSED
Start: 2024-01-26

## (undated) RX ORDER — GLYCOPYRROLATE 0.2 MG/ML
INJECTION, SOLUTION INTRAMUSCULAR; INTRAVENOUS
Status: DISPENSED
Start: 2024-01-26

## (undated) RX ORDER — PROPOFOL 10 MG/ML
INJECTION, EMULSION INTRAVENOUS
Status: DISPENSED
Start: 2024-01-26

## (undated) RX ORDER — OXYMETAZOLINE HYDROCHLORIDE 0.05 G/100ML
SPRAY NASAL
Status: DISPENSED
Start: 2024-01-26

## (undated) RX ORDER — LIDOCAINE HYDROCHLORIDE AND EPINEPHRINE 10; 10 MG/ML; UG/ML
INJECTION, SOLUTION INFILTRATION; PERINEURAL
Status: DISPENSED
Start: 2024-01-26

## (undated) RX ORDER — ESMOLOL HYDROCHLORIDE 10 MG/ML
INJECTION INTRAVENOUS
Status: DISPENSED
Start: 2024-01-26

## (undated) RX ORDER — ONDANSETRON 2 MG/ML
INJECTION INTRAMUSCULAR; INTRAVENOUS
Status: DISPENSED
Start: 2024-01-26

## (undated) RX ORDER — DEXAMETHASONE SODIUM PHOSPHATE 4 MG/ML
INJECTION, SOLUTION INTRA-ARTICULAR; INTRALESIONAL; INTRAMUSCULAR; INTRAVENOUS; SOFT TISSUE
Status: DISPENSED
Start: 2024-01-26

## (undated) RX ORDER — CHLORHEXIDINE GLUCONATE ORAL RINSE 1.2 MG/ML
SOLUTION DENTAL
Status: DISPENSED
Start: 2024-01-26